# Patient Record
Sex: MALE | Race: WHITE | NOT HISPANIC OR LATINO | Employment: OTHER | URBAN - METROPOLITAN AREA
[De-identification: names, ages, dates, MRNs, and addresses within clinical notes are randomized per-mention and may not be internally consistent; named-entity substitution may affect disease eponyms.]

---

## 2017-01-04 ENCOUNTER — TELEPHONE (OUTPATIENT)
Dept: INTERNAL MEDICINE | Facility: CLINIC | Age: 62
End: 2017-01-04

## 2017-01-16 RX ORDER — MOMETASONE FUROATE 50 UG/1
SPRAY, METERED NASAL
Qty: 17 G | Refills: 1 | Status: SHIPPED | OUTPATIENT
Start: 2017-01-16 | End: 2017-06-30 | Stop reason: SDUPTHER

## 2017-03-23 RX ORDER — FINASTERIDE 5 MG/1
TABLET, FILM COATED ORAL
Qty: 90 TABLET | Refills: 1 | Status: SHIPPED | OUTPATIENT
Start: 2017-03-23 | End: 2017-09-07 | Stop reason: SDUPTHER

## 2017-04-07 RX ORDER — DOXAZOSIN 8 MG/1
TABLET ORAL
Qty: 90 TABLET | Refills: 1 | Status: SHIPPED | OUTPATIENT
Start: 2017-04-07 | End: 2017-04-28

## 2017-04-28 ENCOUNTER — APPOINTMENT (OUTPATIENT)
Dept: LAB | Facility: HOSPITAL | Age: 62
End: 2017-04-28

## 2017-04-28 ENCOUNTER — OFFICE VISIT (OUTPATIENT)
Dept: INTERNAL MEDICINE | Facility: CLINIC | Age: 62
End: 2017-04-28

## 2017-04-28 VITALS
OXYGEN SATURATION: 97 % | RESPIRATION RATE: 16 BRPM | HEIGHT: 69 IN | BODY MASS INDEX: 29.03 KG/M2 | HEART RATE: 64 BPM | WEIGHT: 196 LBS | TEMPERATURE: 97 F | SYSTOLIC BLOOD PRESSURE: 120 MMHG | DIASTOLIC BLOOD PRESSURE: 90 MMHG

## 2017-04-28 DIAGNOSIS — H61.22 IMPACTED CERUMEN OF LEFT EAR: ICD-10-CM

## 2017-04-28 DIAGNOSIS — Z11.59 NEED FOR HEPATITIS C SCREENING TEST: ICD-10-CM

## 2017-04-28 DIAGNOSIS — G89.29 CHRONIC PAIN OF BOTH SHOULDERS: ICD-10-CM

## 2017-04-28 DIAGNOSIS — M15.9 GENERALIZED OSTEOARTHRITIS: ICD-10-CM

## 2017-04-28 DIAGNOSIS — I44.4 LEFT ANTERIOR FASCICULAR BLOCK: Primary | ICD-10-CM

## 2017-04-28 DIAGNOSIS — Z12.5 SCREENING PSA (PROSTATE SPECIFIC ANTIGEN): ICD-10-CM

## 2017-04-28 DIAGNOSIS — K21.9 GASTROESOPHAGEAL REFLUX DISEASE WITHOUT ESOPHAGITIS: ICD-10-CM

## 2017-04-28 DIAGNOSIS — E78.00 PURE HYPERCHOLESTEROLEMIA: ICD-10-CM

## 2017-04-28 DIAGNOSIS — M25.511 CHRONIC PAIN OF BOTH SHOULDERS: ICD-10-CM

## 2017-04-28 DIAGNOSIS — N40.0 PROSTATISM: ICD-10-CM

## 2017-04-28 DIAGNOSIS — M25.512 CHRONIC PAIN OF BOTH SHOULDERS: ICD-10-CM

## 2017-04-28 DIAGNOSIS — R73.9 HYPERGLYCEMIA: ICD-10-CM

## 2017-04-28 DIAGNOSIS — E55.9 VITAMIN D DEFICIENCY: ICD-10-CM

## 2017-04-28 DIAGNOSIS — Z00.00 PREVENTATIVE HEALTH CARE: ICD-10-CM

## 2017-04-28 LAB
25(OH)D3 SERPL-MCNC: 39.2 NG/ML
ALBUMIN SERPL-MCNC: 4.5 G/DL (ref 3.2–4.8)
ALBUMIN/GLOB SERPL: 1.5 G/DL (ref 1.5–2.5)
ALP SERPL-CCNC: 61 U/L (ref 25–100)
ALT SERPL W P-5'-P-CCNC: 31 U/L (ref 7–40)
ANION GAP SERPL CALCULATED.3IONS-SCNC: 2 MMOL/L (ref 3–11)
ARTICHOKE IGE QN: 70 MG/DL (ref 0–130)
AST SERPL-CCNC: 33 U/L (ref 0–33)
BASOPHILS # BLD AUTO: 0.04 10*3/MM3 (ref 0–0.2)
BASOPHILS NFR BLD AUTO: 0.5 % (ref 0–1)
BILIRUB SERPL-MCNC: 0.8 MG/DL (ref 0.3–1.2)
BILIRUB UR QL STRIP: NEGATIVE
BUN BLD-MCNC: 16 MG/DL (ref 9–23)
BUN/CREAT SERPL: 20 (ref 7–25)
CALCIUM SPEC-SCNC: 10.1 MG/DL (ref 8.7–10.4)
CHLORIDE SERPL-SCNC: 105 MMOL/L (ref 99–109)
CHOLEST SERPL-MCNC: 149 MG/DL (ref 0–200)
CLARITY UR: CLEAR
CO2 SERPL-SCNC: 34 MMOL/L (ref 20–31)
COLOR UR: YELLOW
CREAT BLD-MCNC: 0.8 MG/DL (ref 0.6–1.3)
CRP SERPL-MCNC: 0.03 MG/DL (ref 0–1)
DEPRECATED RDW RBC AUTO: 48.3 FL (ref 37–54)
EOSINOPHIL # BLD AUTO: 0.12 10*3/MM3 (ref 0.1–0.3)
EOSINOPHIL NFR BLD AUTO: 1.6 % (ref 0–3)
ERYTHROCYTE [DISTWIDTH] IN BLOOD BY AUTOMATED COUNT: 13.6 % (ref 11.3–14.5)
GFR SERPL CREATININE-BSD FRML MDRD: 98 ML/MIN/1.73
GLOBULIN UR ELPH-MCNC: 3.1 GM/DL
GLUCOSE BLD-MCNC: 95 MG/DL (ref 70–100)
GLUCOSE UR STRIP-MCNC: NEGATIVE MG/DL
HBA1C MFR BLD: 6 % (ref 4.8–5.6)
HCT VFR BLD AUTO: 42.9 % (ref 38.9–50.9)
HCV AB SER DONR QL: NORMAL
HDLC SERPL-MCNC: 67 MG/DL (ref 40–60)
HGB BLD-MCNC: 14.1 G/DL (ref 13.1–17.5)
HGB UR QL STRIP.AUTO: NEGATIVE
IMM GRANULOCYTES # BLD: 0.01 10*3/MM3 (ref 0–0.03)
IMM GRANULOCYTES NFR BLD: 0.1 % (ref 0–0.6)
KETONES UR QL STRIP: NEGATIVE
LEUKOCYTE ESTERASE UR QL STRIP.AUTO: NEGATIVE
LYMPHOCYTES # BLD AUTO: 1.63 10*3/MM3 (ref 0.6–4.8)
LYMPHOCYTES NFR BLD AUTO: 22.4 % (ref 24–44)
MCH RBC QN AUTO: 31.5 PG (ref 27–31)
MCHC RBC AUTO-ENTMCNC: 32.9 G/DL (ref 32–36)
MCV RBC AUTO: 96 FL (ref 80–99)
MONOCYTES # BLD AUTO: 0.51 10*3/MM3 (ref 0–1)
MONOCYTES NFR BLD AUTO: 7 % (ref 0–12)
NEUTROPHILS # BLD AUTO: 4.97 10*3/MM3 (ref 1.5–8.3)
NEUTROPHILS NFR BLD AUTO: 68.4 % (ref 41–71)
NITRITE UR QL STRIP: NEGATIVE
PH UR STRIP.AUTO: 6 [PH] (ref 5–8)
PLATELET # BLD AUTO: 194 10*3/MM3 (ref 150–450)
PMV BLD AUTO: 9.8 FL (ref 6–12)
POTASSIUM BLD-SCNC: 4.7 MMOL/L (ref 3.5–5.5)
PROT SERPL-MCNC: 7.6 G/DL (ref 5.7–8.2)
PROT UR QL STRIP: NEGATIVE
PSA SERPL-MCNC: 0.97 NG/ML (ref 0–4)
RBC # BLD AUTO: 4.47 10*6/MM3 (ref 4.2–5.76)
SODIUM BLD-SCNC: 141 MMOL/L (ref 132–146)
SP GR UR STRIP: 1.01 (ref 1–1.03)
TRIGL SERPL-MCNC: 55 MG/DL (ref 0–150)
TSH SERPL DL<=0.05 MIU/L-ACNC: 2 MIU/ML (ref 0.35–5.35)
UROBILINOGEN UR QL STRIP: NORMAL
WBC NRBC COR # BLD: 7.28 10*3/MM3 (ref 3.5–10.8)

## 2017-04-28 PROCEDURE — 80061 LIPID PANEL: CPT | Performed by: INTERNAL MEDICINE

## 2017-04-28 PROCEDURE — 93000 ELECTROCARDIOGRAM COMPLETE: CPT | Performed by: INTERNAL MEDICINE

## 2017-04-28 PROCEDURE — 69210 REMOVE IMPACTED EAR WAX UNI: CPT | Performed by: INTERNAL MEDICINE

## 2017-04-28 PROCEDURE — 36415 COLL VENOUS BLD VENIPUNCTURE: CPT | Performed by: INTERNAL MEDICINE

## 2017-04-28 PROCEDURE — 84443 ASSAY THYROID STIM HORMONE: CPT | Performed by: INTERNAL MEDICINE

## 2017-04-28 PROCEDURE — 85025 COMPLETE CBC W/AUTO DIFF WBC: CPT | Performed by: INTERNAL MEDICINE

## 2017-04-28 PROCEDURE — 81003 URINALYSIS AUTO W/O SCOPE: CPT | Performed by: INTERNAL MEDICINE

## 2017-04-28 PROCEDURE — 83036 HEMOGLOBIN GLYCOSYLATED A1C: CPT | Performed by: INTERNAL MEDICINE

## 2017-04-28 PROCEDURE — 84153 ASSAY OF PSA TOTAL: CPT | Performed by: INTERNAL MEDICINE

## 2017-04-28 PROCEDURE — 99215 OFFICE O/P EST HI 40 MIN: CPT | Performed by: INTERNAL MEDICINE

## 2017-04-28 PROCEDURE — 80053 COMPREHEN METABOLIC PANEL: CPT | Performed by: INTERNAL MEDICINE

## 2017-04-28 PROCEDURE — 82306 VITAMIN D 25 HYDROXY: CPT | Performed by: INTERNAL MEDICINE

## 2017-04-28 PROCEDURE — 86140 C-REACTIVE PROTEIN: CPT | Performed by: INTERNAL MEDICINE

## 2017-04-28 PROCEDURE — 86803 HEPATITIS C AB TEST: CPT | Performed by: INTERNAL MEDICINE

## 2017-04-28 RX ORDER — TAMSULOSIN HYDROCHLORIDE 0.4 MG/1
1 CAPSULE ORAL NIGHTLY
Qty: 30 CAPSULE | Refills: 5 | Status: SHIPPED | OUTPATIENT
Start: 2017-04-28 | End: 2017-11-06 | Stop reason: ALTCHOICE

## 2017-04-28 RX ORDER — FAMOTIDINE 20 MG/1
20 TABLET, FILM COATED ORAL 2 TIMES DAILY PRN
COMMUNITY
End: 2019-01-23

## 2017-04-28 NOTE — PATIENT INSTRUCTIONS
1.  Stop doxazosin - start tamsulosin at bedtime - to improve prostate function.    2.  Continue other medications and supplements - as listed.    3.  Use Pepcid and antacids as needed - for stomach distress.    4.  Use 1 pound hand weights - for shoulder strengthening - 10 minutes - Monday Wednesday Friday morning.    5.  Follow well-balanced diet - low in salt and low in sugar.    6.  The nurse will call with test results.    7.  Return in August - fasting checkup.

## 2017-04-28 NOTE — PROGRESS NOTES
Subjective   Anderson Goode is a 61 y.o. male.     Chief Complaint   Patient presents with   • Hyperlipidemia       History of Present Illness     The patient has a long history of obesity with a total weight of 216 and a BMI of 31 - 4 years ago.  He has been referred to the dietitian and greatly tightened down on his calorie intake.  He has followed  a diabetic diet because of fasting hyperglycemia.  His mother is diabetic.  He has noted episodes of renal disease or neuropathy.    The following portions of the patient's history were reviewed and updated as appropriate: allergies, current medications, past family history, past medical history, past social history, past surgical history and problem list.    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/29/2016   • Prostatism 07/29/2016   • Chronic pain of both shoulders 07/29/2016   • Vitamin D deficiency 07/29/2016   • Preventative health care 08/19/2016   • Colon adenomas 08/19/2016   • RBBB 08/21/2016   • Diverticulosis 12/24/2016   • Cerumen impaction 04/30/2017     Resolved Ambulatory Problems     Diagnosis Date Noted   • Disorder of prostate 07/29/2016   • Shoulder injury 07/29/2016     Past Medical History:   Diagnosis Date   • Allergic rhinitis    • Chronic low back pain 1988   • Colon adenomas 2016   • Diverticulosis 2016   • Elbow fracture, right 1964   • Generalized osteoarthritis    • GERD (gastroesophageal reflux disease)    • Hyperlipidemia    • Obesity    • Prediabetes    • Prostatism    • RBBB    • UTI (urinary tract infection)    • Vitamin D deficiency      Past Surgical History:   Procedure Laterality Date   • APPENDECTOMY  1964   • CARDIOVASCULAR STRESS TEST  2014    Nuclear stress test normal   • COLONOSCOPY W/ POLYPECTOMY   2016    Benign adenomas   • HEMORRHOIDECTOMY  , 's x 3      with banding   • LIFT / REPAIR BROW PTOSIS FOREHEAD Bilateral    • LUMBAR EPIDURAL INJECTION  1988    Injections ×7 after back injury from fall   • TONSILLECTOMY  1961     Family History   Problem Relation Age of Onset   • Atrial fibrillation Mother    • Arthritis Mother    • Breast cancer Mother    • Diabetes Mother    • Heart attack Father       age 84   • COPD Father      tobacco user   • Other Father      MRSA   • Pneumonia Father    • Obesity Sister    • Esophagitis Sister    • Obesity Brother    • Drug abuse Other      Overdose   • Dementia Other    • Lumbar disc disease Other      Chronic pain syndrome     Social History     Social History   • Marital status:      Spouse name: N/A   • Number of children: N/A   • Years of education: N/A     Occupational History   • Not on file.     Social History Main Topics   • Smoking status: Never Smoker   • Smokeless tobacco: Never Used   • Alcohol use 4.2 oz/week     7 Cans of beer per week      Comment: wife smoked 2 PPD - 34 years   • Drug use: No   • Sexual activity: Not on file     Other Topics Concern   • Not on file     Social History Narrative    Domestic life   lives in private home with wife who is severely disabled - most the time in bed                             must check on her every 4 hours 7 days a week.                             must attend to all her hygiene.  no routine help from family or friends.        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   HENT: Positive for congestion and sneezing. Negative for ear pain and sore throat.         Increase congestion symptoms this spring   Eyes: Negative for itching and visual disturbance.   Respiratory: Negative for cough and shortness of breath.    Cardiovascular: Negative for chest pain and palpitations.   Gastrointestinal: Negative for abdominal pain and nausea.        Minimal indigestion.   Endocrine: Negative for cold intolerance and heat intolerance.   Genitourinary: Positive for difficulty urinating. Negative for dysuria and hematuria.        Weak urine stream and adequate control.  Continues on Flomax and Cardura.   Musculoskeletal: Positive for arthralgias and back pain. Negative for gait problem.        Moderate generalized joint aches respond to OTC med   Skin: Negative for rash and wound.   Allergic/Immunologic: Negative for environmental allergies and food allergies.   Neurological: Negative for dizziness and headaches.   Hematological: Negative for adenopathy. Does not bruise/bleed easily.   Psychiatric/Behavioral: Negative for sleep disturbance. The patient is not nervous/anxious.        Objective   Blood pressure 120/90, pulse 64, temperature 97 °F (36.1 °C), temperature source Oral, resp. rate 16, height 69\" (175.3 cm), weight 196 lb (88.9 kg), SpO2 97 %.    Physical Exam   Constitutional: He is oriented to person, place, and time. He appears well-developed and well-nourished. No distress.   HENT:   Right Ear: External ear normal.   Left Ear: External ear normal.   Nose: Nose normal.   Mouth/Throat: Oropharynx is clear and moist.   Left ear obstructed with wax.  Canal and TM normal after wax removal except for mild erythema of lower canal wall..  Markedly decreased hearing right ear.   Eyes: EOM are normal. Pupils are equal, round, and reactive to light.   Neck: Normal range of motion. Neck supple. No JVD present. No thyromegaly present.   Cardiovascular: " Normal rate, regular rhythm, normal heart sounds and intact distal pulses.    Pulmonary/Chest: Effort normal and breath sounds normal. He has no wheezes. He has no rales.   Abdominal: Soft. Bowel sounds are normal. He exhibits no distension and no mass. There is no tenderness. No hernia.   Genitourinary: Rectum normal and penis normal.   Genitourinary Comments: Prostate moderately enlarged  Testicles normal   Musculoskeletal: Normal range of motion. He exhibits no edema or tenderness.   Minimal low back tenderness.   Lymphadenopathy:     He has no cervical adenopathy.   Neurological: He is alert and oriented to person, place, and time. He displays normal reflexes. No cranial nerve deficit. He exhibits normal muscle tone. Coordination normal.   Vibratory normal  Romberg negative  Gait normal  Plantars downgoing     Skin: Skin is warm and dry. No rash noted.   Psychiatric: He has a normal mood and affect. His behavior is normal. Judgment and thought content normal.   Nursing note and vitals reviewed.      ECG 12 Lead  Date/Time: 4/28/2017 11:00 AM  Performed by: DINH RAINES  Authorized by: DINH RAINES   Interpreted by ED physician: Dinh Raines M.D.  Comparison: compared with previous ECG from 8/19/2016  Similar to previous ECG  Rhythm: sinus rhythm  Rate: normal  BPM: 60  Conduction: right bundle branch block and non-specific intraventricular conduction delay  ST Segments: ST segments normal  T Waves: T waves normal  QRS axis: left  Other findings: early repolarization  Clinical impression: abnormal ECG  Comments: Indication - abnormal EKG  Baseline EKG      Ear Cerumen Removal Instrumentation  Date/Time: 4/28/2017 11:13 AM  Performed by: DINH RAINES  Authorized by: DINH RAINES  Consent: Verbal consent obtained. Written consent obtained.  Risks and benefits: risks, benefits and alternatives were discussed  Consent given by: patient  Patient understanding: patient states understanding  "of the procedure being performed  Patient consent: the patient's understanding of the procedure matches consent given  Procedure consent: procedure consent matches procedure scheduled  Relevant documents: relevant documents present and verified  Site marked: the operative site was marked  Patient identity confirmed: verbally with patient  Time out: Immediately prior to procedure a \"time out\" was called to verify the correct patient, procedure, equipment, support staff and site/side marked as required.  Local anesthetic: none  Location details: left ear  Procedure type: curette  Patient sedated: no  Patient tolerance: Patient tolerated the procedure well with no immediate complications  Comments: Complete impaction of the left ear with scaly and firm wax.  Easily removed with sterile curet.  Patient had no discomfort.  TM is fairly normal.  There is mild erythema of the lower canal.  Aluminum acetate instilled in left ear canal.  Hearing is fully normal.        Assessment/Plan   Anderson was seen today for hyperlipidemia.    Diagnoses and all orders for this visit:    Left anterior fascicular block  -     ECG 12 Lead  -     TSH    Pure hypercholesterolemia  -     Comprehensive Metabolic Panel  -     Lipid Panel    Gastroesophageal reflux disease without esophagitis  -     CBC & Differential  -     Urinalysis With / Microscopic If Indicated  -     CBC Auto Differential    Vitamin D deficiency  -     Vitamin D 25 Hydroxy    Generalized osteoarthritis  -     C-reactive Protein    Prostatism    Hyperglycemia  -     Hemoglobin A1c    Preventative health care    Chronic pain of both shoulders    Screening PSA (prostate specific antigen)  -     PSA    Need for hepatitis C screening test  -     Hepatitis C Antibody    Impacted cerumen of left ear    Other orders  -     tamsulosin (FLOMAX) 0.4 MG capsule 24 hr capsule; Take 1 capsule by mouth Every Night.  -     Ear Cerumen Removal Instrumentation      The patient's prediabetes " has severely worsened now with a hemoglobin A1c of 6.0.  I've asked him to bring his weight down below 190 pounds this year.  He may be a candidate for metformin with any further deterioration in his metabolic status.    The patient has moderate hyperlipidemia and risk for stroke and heart attack.  His LDL cholesterol now at 70 is excellent and he should continue the same atorvastatin.  He is on daily aspirin for primary prevention.    The patient has moderate prostatism and for some reason has stayed on both Flomax and Cardura.  I've asked him to stop Cardura since his blood pressure is running low.  We will monitor this expectantly.    The patient has GERD with many year history of heartburn symptoms.  He has come off of omeprazole for long-term safety.  I've asked him to be regular with famotidine for the near future.    The patient has chronic history of shoulder tendinitis.  He shoulders are moving better today but he has intermittent achiness.  He does return to repetitive lifting all week.  A few minutes of light weight range of motion exercises may build his shoulder strength and endurance.    The patient has a long history of arthralgias in his knees.  He has done much better on glucosamine.  He should consider stationary cycling to build knee strength.    Patient Instructions   1.  Stop doxazosin - start tamsulosin at bedtime - to improve prostate function.    2.  Continue other medications and supplements - as listed.    3.  Use Pepcid and antacids as needed - for stomach distress.    4.  Use 1 pound hand weights - for shoulder strengthening - 10 minutes - Monday Wednesday Friday morning.    5.  Follow well-balanced diet - low in salt and low in sugar.    6.  The nurse will call with test results.    7.  Return in August - fasting checkup.    8.Hemoglobin A1c has elevated to 6.0.  Tighten diabetic diet.  Consider registered dietitian.    9.  Other laboratory tests are acceptable and require no change in  treatment.    Current Outpatient Prescriptions:   •  famotidine (PEPCID) 20 MG tablet, Take 20 mg by mouth 2 (Two) Times a Day As Needed., Disp: , Rfl:   •  Ascorbic Acid (VITAMIN C) 500 MG capsule, Take  by mouth daily., Disp: , Rfl:   •  Aspirin-Caffeine (CIRILO BACK & BODY PAIN EX ST) 500-32.5 MG tablet, Take  by mouth., Disp: , Rfl:   •  atorvastatin (LIPITOR) 80 MG tablet, TAKE ONE TABLET BY MOUTH EVERY NIGHT AT BEDTIME, Disp: 90 tablet, Rfl: 1  •  Cetirizine HCl (ZYRTEC ALLERGY) 10 MG capsule, Take  by mouth., Disp: , Rfl:   •  Cholecalciferol (VITAMIN D) 1000 UNITS tablet, Take 1 capsule by mouth., Disp: , Rfl:   •  finasteride (PROSCAR) 5 MG tablet, TAKE ONE TABLET BY MOUTH EVERY NIGHT AT BEDTIME, Disp: 90 tablet, Rfl: 1  •  Glucosamine HCl 1500 MG tablet, Take 1,500 mg by mouth daily., Disp: 30 each, Rfl:   •  hypromellose (ARTIFICIAL TEARS) 0.4 % solution, Apply  to eye., Disp: , Rfl:   •  mometasone (NASONEX) 50 MCG/ACT nasal spray, SPRAY ONE SPRAY IN EACH NOSTRIL EVERY MORNING, Disp: 17 g, Rfl: 1  •  Multiple Vitamins-Minerals (CENTRUM ADULTS PO), Take  by mouth daily., Disp: , Rfl:   •  tamsulosin (FLOMAX) 0.4 MG capsule 24 hr capsule, Take 1 capsule by mouth Every Night., Disp: 30 capsule, Rfl: 5    Allergies   Allergen Reactions   • Cephalexin Rash   • Omeprazole GI Intolerance       Immunization History   Administered Date(s) Administered   • Tdap 05/11/2010   • Zoster 07/01/2015     Electronically signed Dinh Raines M.D.4/30/2017 1:48 PM

## 2017-04-30 PROBLEM — H61.20 CERUMEN IMPACTION: Status: ACTIVE | Noted: 2017-04-30

## 2017-05-03 ENCOUNTER — TELEPHONE (OUTPATIENT)
Dept: INTERNAL MEDICINE | Facility: CLINIC | Age: 62
End: 2017-05-03

## 2017-06-30 RX ORDER — MOMETASONE FUROATE 50 UG/1
SPRAY, METERED NASAL
Qty: 17 G | Refills: 1 | Status: SHIPPED | OUTPATIENT
Start: 2017-06-30 | End: 2018-01-10 | Stop reason: SDUPTHER

## 2017-08-25 ENCOUNTER — APPOINTMENT (OUTPATIENT)
Dept: LAB | Facility: HOSPITAL | Age: 62
End: 2017-08-25

## 2017-08-25 ENCOUNTER — OFFICE VISIT (OUTPATIENT)
Dept: INTERNAL MEDICINE | Facility: CLINIC | Age: 62
End: 2017-08-25

## 2017-08-25 VITALS
WEIGHT: 183 LBS | OXYGEN SATURATION: 97 % | BODY MASS INDEX: 27.02 KG/M2 | TEMPERATURE: 97.8 F | RESPIRATION RATE: 16 BRPM | SYSTOLIC BLOOD PRESSURE: 110 MMHG | DIASTOLIC BLOOD PRESSURE: 80 MMHG | HEART RATE: 68 BPM

## 2017-08-25 DIAGNOSIS — E78.00 PURE HYPERCHOLESTEROLEMIA: ICD-10-CM

## 2017-08-25 DIAGNOSIS — R73.9 HYPERGLYCEMIA: ICD-10-CM

## 2017-08-25 DIAGNOSIS — M15.9 GENERALIZED OSTEOARTHRITIS: Primary | ICD-10-CM

## 2017-08-25 DIAGNOSIS — K21.9 GASTROESOPHAGEAL REFLUX DISEASE WITHOUT ESOPHAGITIS: ICD-10-CM

## 2017-08-25 DIAGNOSIS — R35.1 NOCTURIA: ICD-10-CM

## 2017-08-25 DIAGNOSIS — N40.0 PROSTATISM: ICD-10-CM

## 2017-08-25 LAB
ALBUMIN SERPL-MCNC: 4.3 G/DL (ref 3.2–4.8)
ALBUMIN/GLOB SERPL: 1.3 G/DL (ref 1.5–2.5)
ALP SERPL-CCNC: 77 U/L (ref 25–100)
ALT SERPL W P-5'-P-CCNC: 29 U/L (ref 7–40)
ANION GAP SERPL CALCULATED.3IONS-SCNC: 13 MMOL/L (ref 3–11)
ARTICHOKE IGE QN: 79 MG/DL (ref 0–130)
AST SERPL-CCNC: 24 U/L (ref 0–33)
BILIRUB SERPL-MCNC: 1.1 MG/DL (ref 0.3–1.2)
BILIRUB UR QL STRIP: NEGATIVE
BUN BLD-MCNC: 17 MG/DL (ref 9–23)
BUN/CREAT SERPL: 24.3 (ref 7–25)
CALCIUM SPEC-SCNC: 10.4 MG/DL (ref 8.7–10.4)
CHLORIDE SERPL-SCNC: 103 MMOL/L (ref 99–109)
CHOLEST SERPL-MCNC: 161 MG/DL (ref 0–200)
CLARITY UR: CLEAR
CO2 SERPL-SCNC: 23 MMOL/L (ref 20–31)
COLOR UR: YELLOW
CREAT BLD-MCNC: 0.7 MG/DL (ref 0.6–1.3)
GFR SERPL CREATININE-BSD FRML MDRD: 114 ML/MIN/1.73
GLOBULIN UR ELPH-MCNC: 3.4 GM/DL
GLUCOSE BLD-MCNC: 96 MG/DL (ref 70–100)
GLUCOSE UR STRIP-MCNC: NEGATIVE MG/DL
HBA1C MFR BLD: 5.6 % (ref 4.8–5.6)
HDLC SERPL-MCNC: 73 MG/DL (ref 40–60)
HGB UR QL STRIP.AUTO: NEGATIVE
KETONES UR QL STRIP: NEGATIVE
LEUKOCYTE ESTERASE UR QL STRIP.AUTO: NEGATIVE
NITRITE UR QL STRIP: NEGATIVE
PH UR STRIP.AUTO: 5.5 [PH] (ref 5–8)
POTASSIUM BLD-SCNC: 4.8 MMOL/L (ref 3.5–5.5)
PROT SERPL-MCNC: 7.7 G/DL (ref 5.7–8.2)
PROT UR QL STRIP: NEGATIVE
SODIUM BLD-SCNC: 139 MMOL/L (ref 132–146)
SP GR UR STRIP: 1.02 (ref 1–1.03)
TRIGL SERPL-MCNC: 58 MG/DL (ref 0–150)
UROBILINOGEN UR QL STRIP: NORMAL

## 2017-08-25 PROCEDURE — 80061 LIPID PANEL: CPT | Performed by: INTERNAL MEDICINE

## 2017-08-25 PROCEDURE — 99213 OFFICE O/P EST LOW 20 MIN: CPT | Performed by: INTERNAL MEDICINE

## 2017-08-25 PROCEDURE — 36415 COLL VENOUS BLD VENIPUNCTURE: CPT | Performed by: INTERNAL MEDICINE

## 2017-08-25 PROCEDURE — 80053 COMPREHEN METABOLIC PANEL: CPT | Performed by: INTERNAL MEDICINE

## 2017-08-25 PROCEDURE — 81003 URINALYSIS AUTO W/O SCOPE: CPT | Performed by: INTERNAL MEDICINE

## 2017-08-25 PROCEDURE — 83036 HEMOGLOBIN GLYCOSYLATED A1C: CPT | Performed by: INTERNAL MEDICINE

## 2017-08-25 NOTE — PATIENT INSTRUCTIONS
1.  Continue usual medicines and supplements - as listed.    2.  Take Pepcid - only as needed - for indigestion.    3.  Eat 3 meals a day - maintain your current weight.    4.  Get adequate rest every night - prevent fatigue.    5.  Return visit December  -  fasting checkup and preventive exam.

## 2017-08-25 NOTE — PROGRESS NOTES
Subjective   Anderson Goode is a 61 y.o. male.     History of Present Illness     The patient's had many years of prostatism treated with alpha blockers and finasteride.  This year he is having more urgency and frequency daytime and night.  He has had no burning on urination and no hematuria.  He is under great stress because her recent fracture of his wrist and the fact that his wife is hospitalized with likely a terminal illness.    The following portions of the patient's history were reviewed and updated as appropriate: allergies, current medications, past family history, past medical history, past social history, past surgical history and problem list.    Review of Systems   Constitutional: Negative for appetite change and fatigue.   Gastrointestinal: Negative for abdominal distention and nausea.   Genitourinary: Positive for frequency and urgency. Negative for dysuria.   Musculoskeletal: Positive for back pain and joint swelling.   Neurological: Negative for dizziness and light-headedness.   Psychiatric/Behavioral: Positive for sleep disturbance. The patient is nervous/anxious.         Physical wife at Hospital daily this month.  She is likely terminal.       Objective   Blood pressure 110/80, pulse 68, temperature 97.8 °F (36.6 °C), temperature source Oral, resp. rate 16, weight 183 lb (83 kg), SpO2 97 %.    Physical Exam   Constitutional: He is oriented to person, place, and time. He appears well-developed and well-nourished.   Cardiovascular: Normal rate, regular rhythm and normal heart sounds.    Pulmonary/Chest: Effort normal and breath sounds normal. He has no wheezes. He has no rales.   Musculoskeletal:   Right wrist and large rigid brace.  Right  strength mildly impaired.   Neurological: He is alert and oriented to person, place, and time. He displays normal reflexes. No cranial nerve deficit.   Psychiatric: He has a normal mood and affect. His behavior is normal. Thought content normal.   Nursing  note and vitals reviewed.    Procedures  Assessment/Plan   Anderson was seen today for benign prostatic hypertrophy.    Diagnoses and all orders for this visit:    Generalized osteoarthritis    Hyperglycemia  -     Hemoglobin A1c    Pure hypercholesterolemia  -     Comprehensive Metabolic Panel  -     Lipid Panel    Gastroesophageal reflux disease without esophagitis    Prostatism  -     Urinalysis With / Culture If Indicated  -     Ambulatory Referral to Urology    Nocturia  -     Urinalysis With / Culture If Indicated  -     Ambulatory Referral to Urology      The patient has progressive prostatism despite dual therapy now for several years.  I've asked him to visit the urologist.    The patient's hyperlipidemia is adequately controlled with an LDL of 79.  He should continue on aspirin for primary prevention.    The patient is prediabetic with a hemoglobin A!c of 6.0 this year.  He is made marked progress with hemoglobin A1c 5.6%.  This is likely related to his weight loss.    The patient's weight has dropped from 196 down to 183 in the last 4 months.  This is apparently stress related associated with his wife's illness and his own injury.  I've encouraged him to not skip meals and an at least maintain his current weight next visit.      Patient Instructions   1.  Continue usual medicines and supplements - as listed.    2.  Take Pepcid - only as needed - for indigestion.    3.  Eat 3 meals a day - maintain your current weight.    4.  Get adequate rest every night - prevent fatigue.    5.  Return visit December  -  fasting checkup and preventive exam.    6.  LDL cholesterol excellent at 79.    7.  Hemoglobin A1c acceptable at 5.6%.  Continue low-calorie diabetic diet.    8.  Chemistry panel and urinalysis are acceptable.    9.  Appointment with urologist for progressive prostatism.    Electronically signed Dinh Raines M.D.8/27/2017 2:45 PM

## 2017-08-30 ENCOUNTER — TELEPHONE (OUTPATIENT)
Dept: INTERNAL MEDICINE | Facility: CLINIC | Age: 62
End: 2017-08-30

## 2017-08-30 NOTE — TELEPHONE ENCOUNTER
Called labs to pt.  Per TGF:  LDL cholesterol excellent at 79.  Hemoglobin A1c acceptable at 5.6%.  Continue low-calorie diabetic diet.  Chemistry panel and urinalysis are acceptable.  Appointment with urologist for progressive prostatism.  Appt scheduled w Dr Arellano on 9/6 @ 10 am.    Pt verb understanding.

## 2017-09-07 RX ORDER — ATORVASTATIN CALCIUM 80 MG/1
TABLET, FILM COATED ORAL
Qty: 90 TABLET | Refills: 1 | Status: SHIPPED | OUTPATIENT
Start: 2017-09-07 | End: 2018-12-21 | Stop reason: SDUPTHER

## 2017-09-07 RX ORDER — FINASTERIDE 5 MG/1
TABLET, FILM COATED ORAL
Qty: 90 TABLET | Refills: 1 | Status: SHIPPED | OUTPATIENT
Start: 2017-09-07 | End: 2018-04-09 | Stop reason: SDUPTHER

## 2017-11-02 RX ORDER — DOXAZOSIN 8 MG/1
TABLET ORAL
Qty: 90 TABLET | Refills: 0 | OUTPATIENT
Start: 2017-11-02

## 2017-11-06 ENCOUNTER — TELEPHONE (OUTPATIENT)
Dept: INTERNAL MEDICINE | Facility: CLINIC | Age: 62
End: 2017-11-06

## 2017-11-06 RX ORDER — DOXAZOSIN MESYLATE 4 MG/1
4 TABLET ORAL NIGHTLY
Qty: 90 TABLET | Refills: 1 | Status: SHIPPED | OUTPATIENT
Start: 2017-11-06 | End: 2018-05-17 | Stop reason: SDUPTHER

## 2017-11-06 NOTE — TELEPHONE ENCOUNTER
----- Message from Shankar Franco sent at 11/3/2017 12:30 PM EDT -----  Contact: [facility]  [facility]:  [facility] switched him to Tamsulosin in April, but the Doxazosin works better.  He wants to go back on that.  Brooke Miller.  [facility] [phone]

## 2017-12-29 ENCOUNTER — APPOINTMENT (OUTPATIENT)
Dept: LAB | Facility: HOSPITAL | Age: 62
End: 2017-12-29

## 2017-12-29 ENCOUNTER — OFFICE VISIT (OUTPATIENT)
Dept: INTERNAL MEDICINE | Facility: CLINIC | Age: 62
End: 2017-12-29

## 2017-12-29 VITALS
WEIGHT: 194 LBS | SYSTOLIC BLOOD PRESSURE: 120 MMHG | TEMPERATURE: 97.7 F | RESPIRATION RATE: 16 BRPM | DIASTOLIC BLOOD PRESSURE: 70 MMHG | HEART RATE: 72 BPM | OXYGEN SATURATION: 96 % | BODY MASS INDEX: 28.65 KG/M2

## 2017-12-29 DIAGNOSIS — K21.9 GASTROESOPHAGEAL REFLUX DISEASE WITHOUT ESOPHAGITIS: ICD-10-CM

## 2017-12-29 DIAGNOSIS — E78.00 PURE HYPERCHOLESTEROLEMIA: Primary | ICD-10-CM

## 2017-12-29 DIAGNOSIS — N40.0 PROSTATISM: ICD-10-CM

## 2017-12-29 DIAGNOSIS — R73.9 HYPERGLYCEMIA: ICD-10-CM

## 2017-12-29 LAB
ALBUMIN SERPL-MCNC: 4.2 G/DL (ref 3.2–4.8)
ALBUMIN/GLOB SERPL: 1.3 G/DL (ref 1.5–2.5)
ALP SERPL-CCNC: 70 U/L (ref 25–100)
ALT SERPL W P-5'-P-CCNC: 27 U/L (ref 7–40)
ANION GAP SERPL CALCULATED.3IONS-SCNC: 7 MMOL/L (ref 3–11)
ARTICHOKE IGE QN: 70 MG/DL (ref 0–130)
AST SERPL-CCNC: 25 U/L (ref 0–33)
BILIRUB SERPL-MCNC: 0.6 MG/DL (ref 0.3–1.2)
BUN BLD-MCNC: 15 MG/DL (ref 9–23)
BUN/CREAT SERPL: 18.8 (ref 7–25)
CALCIUM SPEC-SCNC: 9.5 MG/DL (ref 8.7–10.4)
CHLORIDE SERPL-SCNC: 102 MMOL/L (ref 99–109)
CHOLEST SERPL-MCNC: 141 MG/DL (ref 0–200)
CO2 SERPL-SCNC: 28 MMOL/L (ref 20–31)
CREAT BLD-MCNC: 0.8 MG/DL (ref 0.6–1.3)
GFR SERPL CREATININE-BSD FRML MDRD: 98 ML/MIN/1.73
GLOBULIN UR ELPH-MCNC: 3.2 GM/DL
GLUCOSE BLD-MCNC: 97 MG/DL (ref 70–100)
HBA1C MFR BLD: 5.8 % (ref 4.8–5.6)
HDLC SERPL-MCNC: 68 MG/DL (ref 40–60)
POTASSIUM BLD-SCNC: 4.7 MMOL/L (ref 3.5–5.5)
PROT SERPL-MCNC: 7.4 G/DL (ref 5.7–8.2)
SODIUM BLD-SCNC: 137 MMOL/L (ref 132–146)
TRIGL SERPL-MCNC: 40 MG/DL (ref 0–150)

## 2017-12-29 PROCEDURE — 80061 LIPID PANEL: CPT | Performed by: INTERNAL MEDICINE

## 2017-12-29 PROCEDURE — 99213 OFFICE O/P EST LOW 20 MIN: CPT | Performed by: INTERNAL MEDICINE

## 2017-12-29 PROCEDURE — 80053 COMPREHEN METABOLIC PANEL: CPT | Performed by: INTERNAL MEDICINE

## 2017-12-29 PROCEDURE — 99396 PREV VISIT EST AGE 40-64: CPT | Performed by: INTERNAL MEDICINE

## 2017-12-29 PROCEDURE — 36415 COLL VENOUS BLD VENIPUNCTURE: CPT | Performed by: INTERNAL MEDICINE

## 2017-12-29 PROCEDURE — 83036 HEMOGLOBIN GLYCOSYLATED A1C: CPT | Performed by: INTERNAL MEDICINE

## 2017-12-29 NOTE — PATIENT INSTRUCTIONS
1.  Continue usual medicines and supplements - as listed.    2.  Follow a low-calorie diabetic diet - low in salt low in sugar.    3.  Walk daily - maintain physical fitness.    4.  Squeeze soft rubber balls every morning - build  strength.    5.  Return visit April - fasting checkup.

## 2017-12-29 NOTE — PROGRESS NOTES
Subjective   Anderson Goode is a 62 y.o. male.     History of Present Illness     The patient's had moderate increased cardiovascular risk in recent years.  His father  of a heart attack in his mother has a heart arrhythmia.  The patient has moderate hyperlipidemia and is been on statin therapy for many years.  In recent years she has become prediabetic and is followed a diabetic diet.  He had brought his weight down to 185 pounds earlier this year.  He has had an extremely stressful life or her last year taking care of his chronically disabled wife who eventually  in recent months.    The following portions of the patient's history were reviewed and updated as appropriate: allergies, current medications, past family history, past medical history, past social history, past surgical history and problem list.    Review of Systems   Constitutional: Negative for appetite change and fatigue.   HENT: Positive for congestion and sneezing. Negative for ear pain and sore throat.    Respiratory: Negative for cough and shortness of breath.    Cardiovascular: Negative for chest pain and palpitations.   Gastrointestinal: Negative for abdominal pain and nausea.   Musculoskeletal: Negative for arthralgias and back pain.        Arthralgia is generally responded to bear back and body.   Neurological: Negative for dizziness and headaches.       Objective   Blood pressure 120/70, pulse 72, temperature 97.7 °F (36.5 °C), temperature source Oral, resp. rate 16, weight 88 kg (194 lb), SpO2 96 %.    Physical Exam   Constitutional: He is oriented to person, place, and time. He appears well-developed and well-nourished. No distress.   Neck: Normal range of motion. Neck supple. No JVD present.   Cardiovascular: Normal rate, regular rhythm and normal heart sounds.    Pulmonary/Chest: Effort normal and breath sounds normal. He has no wheezes. He has no rales.   Abdominal: Soft. Bowel sounds are normal. He exhibits no distension and no  mass. There is no tenderness.   Musculoskeletal: Normal range of motion. He exhibits no edema.   Generalized stiffness of knees hips and shoulders.   Lymphadenopathy:     He has no cervical adenopathy.   Neurological: He is alert and oriented to person, place, and time. He exhibits normal muscle tone. Coordination normal.   Psychiatric: He has a normal mood and affect. His behavior is normal. Judgment and thought content normal.   Nursing note and vitals reviewed.    Procedures  Assessment/Plan   Anderson was seen today for hyperlipidemia.    Diagnoses and all orders for this visit:    Pure hypercholesterolemia  -     Comprehensive Metabolic Panel  -     Lipid Panel    Hyperglycemia  -     Hemoglobin A1c    Prostatism    Gastroesophageal reflux disease without esophagitis      The patient has prediabetes with an elevating hemoglobin A1c at 5.8%.  He has gained 9 pounds of weight this fall.  He apparently has much less stress since the death of his wife and is socializing more with his local family.  I've asked him to keep his weight down to 180 or 185 for long-term health.    Evaluation is cholesterol control is adequate with an LDL of 70.  He should continue atorvastatin 80 mg.  I've asked her to continue daily aspirin intake.    The patient has moderate chronic GERD which also is other first-degree relatives.  He should continue on regular Pepcid.  I've asked him to avoid nonsteroidals and PPIs for long-term safety.    The preventive exam has been reviewed in detail.  The patient has been fully counseled on preventative guidelines for vaccines, cancer screenings, and other health maintenance needs.   The patient has been counseled on guidelines for maintaining a lifestyle to promote good health and to minimize chronic diseases.  The patient has been assisted with scheduling these healthcare procedures for the coming year and given a written document outlining these recommendations.    Patient Instructions   1.   Continue usual medicines and supplements - as listed.    2.  Follow a low-calorie diabetic diet - low in salt low in sugar.    3.  Walk daily - maintain physical fitness.    4.  Squeeze soft rubber balls every morning - build  strength.    5.  Return visit April - fasting checkup.    6.  LDL cholesterol excellent at 70.  Continue atorvastatin daily.    7.  Hemoglobin A1c mildly elevated 5.8%.  This indicates prediabetes requiring a diabetic diet and daily walking.    8.  Chemistry panel is acceptable requires no change in treatment.    Electronically signed Dinh Raines M.D.12/31/2017 10:00 AM

## 2018-01-03 ENCOUNTER — TELEPHONE (OUTPATIENT)
Dept: INTERNAL MEDICINE | Facility: CLINIC | Age: 63
End: 2018-01-03

## 2018-01-03 NOTE — TELEPHONE ENCOUNTER
Called labs.  Per TGF - LDL cholesterol excellent at 70.  Continue atorvastatin daily. Hemoglobin A1c mildly elevated 5.8%.  This indicates prediabetes requiring a diabetic diet and daily walking.  Chemistry panel is acceptable requires no change in treatment.  Pt verb understanding.

## 2018-01-10 RX ORDER — MOMETASONE FUROATE 50 UG/1
SPRAY, METERED NASAL
Qty: 17 G | Refills: 1 | Status: SHIPPED | OUTPATIENT
Start: 2018-01-10 | End: 2018-07-05 | Stop reason: SDUPTHER

## 2018-04-10 RX ORDER — FINASTERIDE 5 MG/1
TABLET, FILM COATED ORAL
Qty: 90 TABLET | Refills: 1 | Status: SHIPPED | OUTPATIENT
Start: 2018-04-10 | End: 2018-10-26 | Stop reason: SDUPTHER

## 2018-05-17 RX ORDER — DOXAZOSIN MESYLATE 4 MG/1
4 TABLET ORAL NIGHTLY
Qty: 90 TABLET | Refills: 1 | Status: SHIPPED | OUTPATIENT
Start: 2018-05-17 | End: 2018-12-06 | Stop reason: SDUPTHER

## 2018-05-23 ENCOUNTER — OFFICE VISIT (OUTPATIENT)
Dept: INTERNAL MEDICINE | Facility: CLINIC | Age: 63
End: 2018-05-23

## 2018-05-23 ENCOUNTER — APPOINTMENT (OUTPATIENT)
Dept: LAB | Facility: HOSPITAL | Age: 63
End: 2018-05-23

## 2018-05-23 VITALS
WEIGHT: 190 LBS | TEMPERATURE: 98 F | OXYGEN SATURATION: 97 % | SYSTOLIC BLOOD PRESSURE: 110 MMHG | HEART RATE: 64 BPM | RESPIRATION RATE: 16 BRPM | DIASTOLIC BLOOD PRESSURE: 76 MMHG | BODY MASS INDEX: 28.06 KG/M2

## 2018-05-23 DIAGNOSIS — K21.9 GASTROESOPHAGEAL REFLUX DISEASE WITHOUT ESOPHAGITIS: ICD-10-CM

## 2018-05-23 DIAGNOSIS — M15.9 GENERALIZED OSTEOARTHRITIS: ICD-10-CM

## 2018-05-23 DIAGNOSIS — R73.9 HYPERGLYCEMIA: Primary | ICD-10-CM

## 2018-05-23 DIAGNOSIS — E78.00 PURE HYPERCHOLESTEROLEMIA: ICD-10-CM

## 2018-05-23 DIAGNOSIS — J30.2 CHRONIC SEASONAL ALLERGIC RHINITIS, UNSPECIFIED TRIGGER: ICD-10-CM

## 2018-05-23 PROBLEM — H61.20 CERUMEN IMPACTION: Status: RESOLVED | Noted: 2017-04-30 | Resolved: 2018-05-23

## 2018-05-23 LAB
ALBUMIN SERPL-MCNC: 4.1 G/DL (ref 3.2–4.8)
ALBUMIN/GLOB SERPL: 1.3 G/DL (ref 1.5–2.5)
ALP SERPL-CCNC: 63 U/L (ref 25–100)
ALT SERPL W P-5'-P-CCNC: 28 U/L (ref 7–40)
ANION GAP SERPL CALCULATED.3IONS-SCNC: 7 MMOL/L (ref 3–11)
ARTICHOKE IGE QN: 70 MG/DL (ref 0–130)
AST SERPL-CCNC: 30 U/L (ref 0–33)
BILIRUB SERPL-MCNC: 0.9 MG/DL (ref 0.3–1.2)
BUN BLD-MCNC: 15 MG/DL (ref 9–23)
BUN/CREAT SERPL: 21.4 (ref 7–25)
CALCIUM SPEC-SCNC: 9.3 MG/DL (ref 8.7–10.4)
CHLORIDE SERPL-SCNC: 106 MMOL/L (ref 99–109)
CHOLEST SERPL-MCNC: 135 MG/DL (ref 0–200)
CO2 SERPL-SCNC: 27 MMOL/L (ref 20–31)
CREAT BLD-MCNC: 0.7 MG/DL (ref 0.6–1.3)
GFR SERPL CREATININE-BSD FRML MDRD: 114 ML/MIN/1.73
GLOBULIN UR ELPH-MCNC: 3.2 GM/DL
GLUCOSE BLD-MCNC: 86 MG/DL (ref 70–100)
HBA1C MFR BLD: 5.9 % (ref 4.8–5.6)
HDLC SERPL-MCNC: 59 MG/DL (ref 40–60)
POTASSIUM BLD-SCNC: 3.9 MMOL/L (ref 3.5–5.5)
PROT SERPL-MCNC: 7.3 G/DL (ref 5.7–8.2)
SODIUM BLD-SCNC: 140 MMOL/L (ref 132–146)
TRIGL SERPL-MCNC: 42 MG/DL (ref 0–150)

## 2018-05-23 PROCEDURE — 83036 HEMOGLOBIN GLYCOSYLATED A1C: CPT | Performed by: INTERNAL MEDICINE

## 2018-05-23 PROCEDURE — 36415 COLL VENOUS BLD VENIPUNCTURE: CPT | Performed by: INTERNAL MEDICINE

## 2018-05-23 PROCEDURE — 80053 COMPREHEN METABOLIC PANEL: CPT | Performed by: INTERNAL MEDICINE

## 2018-05-23 PROCEDURE — 80061 LIPID PANEL: CPT | Performed by: INTERNAL MEDICINE

## 2018-05-23 PROCEDURE — 99214 OFFICE O/P EST MOD 30 MIN: CPT | Performed by: INTERNAL MEDICINE

## 2018-05-23 NOTE — PROGRESS NOTES
Subjective   Anderson Goode is a 62 y.o. male.     History of Present Illness     The patient's had many years of progressive osteoarthritis with increased stiffness and achiness.  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  He does moderate bending and lifting.  He has had no knee swelling.  He has had to avoid nonsteroidals due to GI distress.    The following portions of the patient's history were reviewed and updated as appropriate: allergies, current medications, past family history, past medical history, past social history, past surgical history and problem list.    Review of Systems   Constitutional: Negative for appetite change and fatigue.   HENT: Positive for congestion and sinus pressure. Negative for ear pain and sore throat.    Respiratory: Negative for cough and shortness of breath.    Cardiovascular: Negative for chest pain and palpitations.   Gastrointestinal: Negative for abdominal pain and nausea.   Musculoskeletal: Positive for arthralgias, back pain and gait problem.   Neurological: Negative for dizziness and headaches.       Objective   Blood pressure 110/76, pulse 64, temperature 98 °F (36.7 °C), temperature source Oral, resp. rate 16, weight 86.2 kg (190 lb), SpO2 97 %.    Physical Exam   Constitutional: He is oriented to person, place, and time. He appears well-developed and well-nourished. No distress.   Neck: Normal range of motion. Neck supple. No JVD present.   Cardiovascular: Normal rate, regular rhythm and normal heart sounds.    Pulmonary/Chest: Effort normal and breath sounds normal. He has no wheezes. He has no rales.   Lymphadenopathy:     He has no cervical adenopathy.   Neurological: He is alert and oriented to person, place, and time. He exhibits normal muscle tone. Coordination normal.   Psychiatric: He has a normal mood and affect. His behavior is normal. Judgment and  thought content normal.   Nursing note and vitals reviewed.    Procedures  Assessment/Plan   Anderson was seen today for knee pain.    Diagnoses and all orders for this visit:    Hyperglycemia  -     Hemoglobin A1c    Gastroesophageal reflux disease without esophagitis    Generalized osteoarthritis    Pure hypercholesterolemia  -     Comprehensive Metabolic Panel  -     Lipid Panel    Chronic seasonal allergic rhinitis, unspecified trigger      The patient has progressive knee pain and achiness.  He is not interested in pursuingorthopedic consultation at this time.  I've encouraged him to work with stationary cycling to build knee strength and support.  He continues his aspirin back and body as needed.    The patient has prediabetes and remains on a diabetic diet.  He still has an elevated BMI and should bring his body weight down to 190.    The patient has moderate chronic GERD.  He is doing well on famotidine on an as-needed basis.  He should minimize alcohol as potential cause for chronic gastritis.    Patient Instructions   1.  Continue usual medicines and supplements - as listed.    2.  Follow a low-calorie diabetic diet - low in salt low in sugar.    3.  Use  stationary cycle - 15 minutes 3 days weekly - build knee strength.    4.  The nurse will call - with test results.    5.  Next checkup 4 months - fasting.    6.  Hemoglobin A1c at 5.9% indicates prediabetes with good control of blood sugars.    7.  Chemistry panel and lipid panel are acceptable.    Electronically signed Dinh Raines M.D.5/26/2018 11:19 AM

## 2018-05-23 NOTE — PATIENT INSTRUCTIONS
1.  Continue usual medicines and supplements - as listed.    2.  Follow a low-calorie diabetic diet - low in salt low in sugar.    3.  Use  stationary cycle - 15 minutes 3 days weekly - build knee strength.    4.  The nurse will call - with test results.    5.  Next checkup 4 months - fasting.

## 2018-05-29 ENCOUNTER — TELEPHONE (OUTPATIENT)
Dept: INTERNAL MEDICINE | Facility: CLINIC | Age: 63
End: 2018-05-29

## 2018-05-29 NOTE — TELEPHONE ENCOUNTER
Called labs to pt. Left msg for pt to call for lab results per  THERESE (nodetailed VM on H phone, no cell # available)

## 2018-06-04 ENCOUNTER — TELEPHONE (OUTPATIENT)
Dept: INTERNAL MEDICINE | Facility: CLINIC | Age: 63
End: 2018-06-04

## 2018-06-04 NOTE — TELEPHONE ENCOUNTER
Pt returned call re labs.  Per TGF  Hemoglobin A1c at 5.9% indicates prediabetes with good control of blood sugars.   Chemistry panel and lipid panel are acceptable.  Pt verb understanding.l

## 2018-06-29 ENCOUNTER — APPOINTMENT (OUTPATIENT)
Dept: CT IMAGING | Facility: HOSPITAL | Age: 63
End: 2018-06-29

## 2018-06-29 ENCOUNTER — HOSPITAL ENCOUNTER (EMERGENCY)
Facility: HOSPITAL | Age: 63
Discharge: HOME | End: 2018-06-29
Attending: EMERGENCY MEDICINE | Admitting: EMERGENCY MEDICINE

## 2018-06-29 VITALS
OXYGEN SATURATION: 93 % | WEIGHT: 190 LBS | DIASTOLIC BLOOD PRESSURE: 88 MMHG | HEART RATE: 84 BPM | SYSTOLIC BLOOD PRESSURE: 112 MMHG | RESPIRATION RATE: 16 BRPM | BODY MASS INDEX: 25.73 KG/M2 | TEMPERATURE: 98.1 F | HEIGHT: 72 IN

## 2018-06-29 DIAGNOSIS — R10.30 LOWER ABDOMINAL PAIN: Primary | ICD-10-CM

## 2018-06-29 LAB
ALBUMIN SERPL-MCNC: 3.87 G/DL (ref 3.2–4.8)
ALBUMIN/GLOB SERPL: 1.4 G/DL (ref 1.5–2.5)
ALP SERPL-CCNC: 61 U/L (ref 25–100)
ALT SERPL W P-5'-P-CCNC: 28 U/L (ref 7–40)
ANION GAP SERPL CALCULATED.3IONS-SCNC: 7 MMOL/L (ref 3–11)
AST SERPL-CCNC: 25 U/L (ref 0–33)
BASOPHILS # BLD AUTO: 0.04 10*3/MM3 (ref 0–0.2)
BASOPHILS NFR BLD AUTO: 0.5 % (ref 0–1)
BILIRUB SERPL-MCNC: 1.2 MG/DL (ref 0.3–1.2)
BILIRUB UR QL STRIP: NEGATIVE
BUN BLD-MCNC: 17 MG/DL (ref 9–23)
BUN/CREAT SERPL: 26.6 (ref 7–25)
CALCIUM SPEC-SCNC: 9 MG/DL (ref 8.7–10.4)
CHLORIDE SERPL-SCNC: 106 MMOL/L (ref 99–109)
CLARITY UR: CLEAR
CO2 SERPL-SCNC: 29 MMOL/L (ref 20–31)
COLOR UR: YELLOW
CREAT BLD-MCNC: 0.64 MG/DL (ref 0.6–1.3)
DEPRECATED RDW RBC AUTO: 48.1 FL (ref 37–54)
EOSINOPHIL # BLD AUTO: 0.08 10*3/MM3 (ref 0–0.3)
EOSINOPHIL NFR BLD AUTO: 0.9 % (ref 0–3)
ERYTHROCYTE [DISTWIDTH] IN BLOOD BY AUTOMATED COUNT: 13.9 % (ref 11.3–14.5)
GFR SERPL CREATININE-BSD FRML MDRD: 126 ML/MIN/1.73
GLOBULIN UR ELPH-MCNC: 2.7 GM/DL
GLUCOSE BLD-MCNC: 118 MG/DL (ref 70–100)
GLUCOSE UR STRIP-MCNC: NEGATIVE MG/DL
HCT VFR BLD AUTO: 38.9 % (ref 38.9–50.9)
HGB BLD-MCNC: 12.8 G/DL (ref 13.1–17.5)
HGB UR QL STRIP.AUTO: NEGATIVE
IMM GRANULOCYTES # BLD: 0.02 10*3/MM3 (ref 0–0.03)
IMM GRANULOCYTES NFR BLD: 0.2 % (ref 0–0.6)
KETONES UR QL STRIP: NEGATIVE
LEUKOCYTE ESTERASE UR QL STRIP.AUTO: NEGATIVE
LIPASE SERPL-CCNC: 24 U/L (ref 6–51)
LYMPHOCYTES # BLD AUTO: 1.72 10*3/MM3 (ref 0.6–4.8)
LYMPHOCYTES NFR BLD AUTO: 19.5 % (ref 24–44)
MCH RBC QN AUTO: 31.2 PG (ref 27–31)
MCHC RBC AUTO-ENTMCNC: 32.9 G/DL (ref 32–36)
MCV RBC AUTO: 94.9 FL (ref 80–99)
MONOCYTES # BLD AUTO: 0.78 10*3/MM3 (ref 0–1)
MONOCYTES NFR BLD AUTO: 8.8 % (ref 0–12)
NEUTROPHILS # BLD AUTO: 6.22 10*3/MM3 (ref 1.5–8.3)
NEUTROPHILS NFR BLD AUTO: 70.3 % (ref 41–71)
NITRITE UR QL STRIP: NEGATIVE
PH UR STRIP.AUTO: 5.5 [PH] (ref 5–8)
PLATELET # BLD AUTO: 181 10*3/MM3 (ref 150–450)
PMV BLD AUTO: 9.9 FL (ref 6–12)
POTASSIUM BLD-SCNC: 4.2 MMOL/L (ref 3.5–5.5)
PROT SERPL-MCNC: 6.6 G/DL (ref 5.7–8.2)
PROT UR QL STRIP: NEGATIVE
RBC # BLD AUTO: 4.1 10*6/MM3 (ref 4.2–5.76)
SODIUM BLD-SCNC: 142 MMOL/L (ref 132–146)
SP GR UR STRIP: 1.05 (ref 1–1.03)
UROBILINOGEN UR QL STRIP: ABNORMAL
WBC NRBC COR # BLD: 8.84 10*3/MM3 (ref 3.5–10.8)

## 2018-06-29 PROCEDURE — 25010000002 ONDANSETRON PER 1 MG: Performed by: EMERGENCY MEDICINE

## 2018-06-29 PROCEDURE — 99283 EMERGENCY DEPT VISIT LOW MDM: CPT

## 2018-06-29 PROCEDURE — 25010000002 IOPAMIDOL 61 % SOLUTION: Performed by: EMERGENCY MEDICINE

## 2018-06-29 PROCEDURE — 85025 COMPLETE CBC W/AUTO DIFF WBC: CPT | Performed by: EMERGENCY MEDICINE

## 2018-06-29 PROCEDURE — 80053 COMPREHEN METABOLIC PANEL: CPT | Performed by: EMERGENCY MEDICINE

## 2018-06-29 PROCEDURE — 83690 ASSAY OF LIPASE: CPT | Performed by: EMERGENCY MEDICINE

## 2018-06-29 PROCEDURE — 74177 CT ABD & PELVIS W/CONTRAST: CPT

## 2018-06-29 PROCEDURE — 25010000002 HYDROMORPHONE PER 4 MG: Performed by: EMERGENCY MEDICINE

## 2018-06-29 PROCEDURE — 96374 THER/PROPH/DIAG INJ IV PUSH: CPT

## 2018-06-29 PROCEDURE — 81003 URINALYSIS AUTO W/O SCOPE: CPT | Performed by: EMERGENCY MEDICINE

## 2018-06-29 PROCEDURE — 96375 TX/PRO/DX INJ NEW DRUG ADDON: CPT

## 2018-06-29 RX ORDER — SODIUM CHLORIDE 0.9 % (FLUSH) 0.9 %
10 SYRINGE (ML) INJECTION AS NEEDED
Status: DISCONTINUED | OUTPATIENT
Start: 2018-06-29 | End: 2018-06-29 | Stop reason: HOSPADM

## 2018-06-29 RX ORDER — ONDANSETRON 2 MG/ML
4 INJECTION INTRAMUSCULAR; INTRAVENOUS ONCE
Status: COMPLETED | OUTPATIENT
Start: 2018-06-29 | End: 2018-06-29

## 2018-06-29 RX ADMIN — HYDROMORPHONE HYDROCHLORIDE 0.5 MG: 1 INJECTION, SOLUTION INTRAMUSCULAR; INTRAVENOUS; SUBCUTANEOUS at 13:49

## 2018-06-29 RX ADMIN — IOPAMIDOL 95 ML: 612 INJECTION, SOLUTION INTRAVENOUS at 15:51

## 2018-06-29 RX ADMIN — SODIUM CHLORIDE 500 ML: 9 INJECTION, SOLUTION INTRAVENOUS at 16:27

## 2018-06-29 RX ADMIN — SODIUM CHLORIDE 500 ML: 9 INJECTION, SOLUTION INTRAVENOUS at 13:47

## 2018-06-29 RX ADMIN — ONDANSETRON 4 MG: 2 INJECTION, SOLUTION INTRAMUSCULAR; INTRAVENOUS at 13:48

## 2018-07-05 RX ORDER — MOMETASONE FUROATE 50 UG/1
SPRAY, METERED NASAL
Qty: 17 G | Refills: 2 | Status: SHIPPED | OUTPATIENT
Start: 2018-07-05 | End: 2019-05-02 | Stop reason: SDUPTHER

## 2018-09-26 ENCOUNTER — OFFICE VISIT (OUTPATIENT)
Dept: FAMILY MEDICINE CLINIC | Facility: CLINIC | Age: 63
End: 2018-09-26

## 2018-09-26 VITALS
DIASTOLIC BLOOD PRESSURE: 68 MMHG | WEIGHT: 194.8 LBS | HEART RATE: 78 BPM | BODY MASS INDEX: 26.38 KG/M2 | OXYGEN SATURATION: 97 % | SYSTOLIC BLOOD PRESSURE: 110 MMHG | HEIGHT: 72 IN

## 2018-09-26 DIAGNOSIS — E55.9 VITAMIN D DEFICIENCY: ICD-10-CM

## 2018-09-26 DIAGNOSIS — E78.00 PURE HYPERCHOLESTEROLEMIA: Primary | ICD-10-CM

## 2018-09-26 DIAGNOSIS — R73.9 HYPERGLYCEMIA: ICD-10-CM

## 2018-09-26 DIAGNOSIS — K21.9 GASTROESOPHAGEAL REFLUX DISEASE WITHOUT ESOPHAGITIS: ICD-10-CM

## 2018-09-26 PROCEDURE — 99213 OFFICE O/P EST LOW 20 MIN: CPT | Performed by: FAMILY MEDICINE

## 2018-10-10 ENCOUNTER — TELEPHONE (OUTPATIENT)
Dept: FAMILY MEDICINE CLINIC | Facility: CLINIC | Age: 63
End: 2018-10-10

## 2018-10-10 LAB
25(OH)D3 SERPL-MCNC: 33.9 NG/ML
ALBUMIN SERPL-MCNC: 4.31 G/DL (ref 3.2–4.8)
ALBUMIN/GLOB SERPL: 1.7 G/DL (ref 1.5–2.5)
ALP SERPL-CCNC: 62 U/L (ref 25–100)
ALT SERPL W P-5'-P-CCNC: 36 U/L (ref 7–40)
ANION GAP SERPL CALCULATED.3IONS-SCNC: 1 MMOL/L (ref 3–11)
ARTICHOKE IGE QN: 59 MG/DL (ref 0–130)
AST SERPL-CCNC: 31 U/L (ref 0–33)
BILIRUB SERPL-MCNC: 1 MG/DL (ref 0.3–1.2)
BUN BLD-MCNC: 17 MG/DL (ref 9–23)
BUN/CREAT SERPL: 21.3 (ref 7–25)
CALCIUM SPEC-SCNC: 9.1 MG/DL (ref 8.7–10.4)
CHLORIDE SERPL-SCNC: 103 MMOL/L (ref 99–109)
CHOLEST SERPL-MCNC: 130 MG/DL (ref 0–200)
CO2 SERPL-SCNC: 33 MMOL/L (ref 20–31)
CREAT BLD-MCNC: 0.8 MG/DL (ref 0.6–1.3)
GFR SERPL CREATININE-BSD FRML MDRD: 98 ML/MIN/1.73
GLOBULIN UR ELPH-MCNC: 2.5 GM/DL
GLUCOSE BLD-MCNC: 98 MG/DL (ref 70–100)
HBA1C MFR BLD: 6.3 % (ref 4.8–5.6)
HDLC SERPL-MCNC: 58 MG/DL (ref 40–60)
POTASSIUM BLD-SCNC: 4.4 MMOL/L (ref 3.5–5.5)
PROT SERPL-MCNC: 6.8 G/DL (ref 5.7–8.2)
SODIUM BLD-SCNC: 137 MMOL/L (ref 132–146)
TRIGL SERPL-MCNC: 47 MG/DL (ref 0–150)

## 2018-10-10 PROCEDURE — 80053 COMPREHEN METABOLIC PANEL: CPT | Performed by: FAMILY MEDICINE

## 2018-10-10 PROCEDURE — 80061 LIPID PANEL: CPT | Performed by: FAMILY MEDICINE

## 2018-10-10 PROCEDURE — 82306 VITAMIN D 25 HYDROXY: CPT | Performed by: FAMILY MEDICINE

## 2018-10-10 PROCEDURE — 83036 HEMOGLOBIN GLYCOSYLATED A1C: CPT | Performed by: FAMILY MEDICINE

## 2018-10-10 PROCEDURE — 36415 COLL VENOUS BLD VENIPUNCTURE: CPT | Performed by: FAMILY MEDICINE

## 2018-10-10 NOTE — TELEPHONE ENCOUNTER
PT CAME IN AND SAID HE SEEN THAT [facility] IS ADVERTISING LUNG SCREENINGS AND HE WOULD LIKE TO GET A REFERRAL TO HAVE ONE DONE BECAUSE HIS LEFT LUNG HAS BEEN BOTHERING HIM FOR AWHILE.  PT SAID HE FORGOT TO MENTION IT TO PCP ON HIS LAST VISIT.     PLEASE CALL PT BACK AND LET HIM KNOW WHEN AND WHERE HE NEEDS TO [phone]

## 2018-10-26 RX ORDER — FINASTERIDE 5 MG/1
TABLET, FILM COATED ORAL
Qty: 90 TABLET | Refills: 0 | Status: SHIPPED | OUTPATIENT
Start: 2018-10-26 | End: 2021-08-17

## 2018-12-06 RX ORDER — DOXAZOSIN MESYLATE 4 MG/1
TABLET ORAL
Qty: 90 TABLET | Refills: 0 | Status: SHIPPED | OUTPATIENT
Start: 2018-12-06 | End: 2019-03-15 | Stop reason: SDUPTHER

## 2018-12-21 RX ORDER — ATORVASTATIN CALCIUM 80 MG/1
TABLET, FILM COATED ORAL
Qty: 90 TABLET | Refills: 0 | Status: SHIPPED | OUTPATIENT
Start: 2018-12-21 | End: 2019-03-27 | Stop reason: SDUPTHER

## 2019-01-23 ENCOUNTER — OFFICE VISIT (OUTPATIENT)
Dept: FAMILY MEDICINE CLINIC | Facility: CLINIC | Age: 64
End: 2019-01-23

## 2019-01-23 VITALS
SYSTOLIC BLOOD PRESSURE: 118 MMHG | WEIGHT: 195 LBS | HEIGHT: 72 IN | DIASTOLIC BLOOD PRESSURE: 74 MMHG | BODY MASS INDEX: 26.41 KG/M2 | HEART RATE: 88 BPM | OXYGEN SATURATION: 96 %

## 2019-01-23 DIAGNOSIS — E78.00 PURE HYPERCHOLESTEROLEMIA: ICD-10-CM

## 2019-01-23 DIAGNOSIS — R73.9 HYPERGLYCEMIA: Primary | ICD-10-CM

## 2019-01-23 LAB — HBA1C MFR BLD: 5.6 %

## 2019-01-23 PROCEDURE — 99214 OFFICE O/P EST MOD 30 MIN: CPT | Performed by: FAMILY MEDICINE

## 2019-01-23 PROCEDURE — 83036 HEMOGLOBIN GLYCOSYLATED A1C: CPT | Performed by: FAMILY MEDICINE

## 2019-03-15 RX ORDER — DOXAZOSIN MESYLATE 4 MG/1
TABLET ORAL
Qty: 90 TABLET | Refills: 0 | Status: SHIPPED | OUTPATIENT
Start: 2019-03-15 | End: 2019-06-26 | Stop reason: SDUPTHER

## 2019-03-27 RX ORDER — ATORVASTATIN CALCIUM 80 MG/1
TABLET, FILM COATED ORAL
Qty: 90 TABLET | Refills: 0 | Status: SHIPPED | OUTPATIENT
Start: 2019-03-27 | End: 2019-07-02 | Stop reason: SDUPTHER

## 2019-04-03 ENCOUNTER — OFFICE VISIT (OUTPATIENT)
Dept: FAMILY MEDICINE CLINIC | Facility: CLINIC | Age: 64
End: 2019-04-03

## 2019-04-03 ENCOUNTER — HOSPITAL ENCOUNTER (OUTPATIENT)
Dept: GENERAL RADIOLOGY | Facility: HOSPITAL | Age: 64
Discharge: HOME | End: 2019-04-03
Admitting: FAMILY MEDICINE

## 2019-04-03 ENCOUNTER — APPOINTMENT (OUTPATIENT)
Dept: LAB | Facility: HOSPITAL | Age: 64
End: 2019-04-03

## 2019-04-03 VITALS
OXYGEN SATURATION: 98 % | DIASTOLIC BLOOD PRESSURE: 82 MMHG | SYSTOLIC BLOOD PRESSURE: 122 MMHG | BODY MASS INDEX: 26.14 KG/M2 | WEIGHT: 193 LBS | HEIGHT: 72 IN | HEART RATE: 81 BPM

## 2019-04-03 DIAGNOSIS — K92.1 BLOOD IN STOOL: ICD-10-CM

## 2019-04-03 DIAGNOSIS — R07.9 CHEST PAIN, UNSPECIFIED TYPE: ICD-10-CM

## 2019-04-03 DIAGNOSIS — M79.671 BILATERAL FOOT PAIN: ICD-10-CM

## 2019-04-03 DIAGNOSIS — M79.671 BILATERAL FOOT PAIN: Primary | ICD-10-CM

## 2019-04-03 DIAGNOSIS — M79.672 BILATERAL FOOT PAIN: ICD-10-CM

## 2019-04-03 DIAGNOSIS — M79.672 BILATERAL FOOT PAIN: Primary | ICD-10-CM

## 2019-04-03 DIAGNOSIS — E78.00 PURE HYPERCHOLESTEROLEMIA: ICD-10-CM

## 2019-04-03 DIAGNOSIS — R06.02 SHORTNESS OF BREATH: ICD-10-CM

## 2019-04-03 DIAGNOSIS — R73.9 HYPERGLYCEMIA: ICD-10-CM

## 2019-04-03 LAB
ALBUMIN SERPL-MCNC: 4.14 G/DL (ref 3.2–4.8)
ALBUMIN/GLOB SERPL: 1.7 G/DL (ref 1.5–2.5)
ALP SERPL-CCNC: 67 U/L (ref 25–100)
ALT SERPL W P-5'-P-CCNC: 32 U/L (ref 7–40)
ANION GAP SERPL CALCULATED.3IONS-SCNC: 8 MMOL/L (ref 3–11)
ARTICHOKE IGE QN: 64 MG/DL (ref 0–130)
AST SERPL-CCNC: 29 U/L (ref 0–33)
BILIRUB SERPL-MCNC: 0.8 MG/DL (ref 0.3–1.2)
BUN BLD-MCNC: 19 MG/DL (ref 9–23)
BUN/CREAT SERPL: 24.4 (ref 7–25)
CALCIUM SPEC-SCNC: 9.3 MG/DL (ref 8.7–10.4)
CHLORIDE SERPL-SCNC: 105 MMOL/L (ref 99–109)
CHOLEST SERPL-MCNC: 132 MG/DL (ref 0–200)
CO2 SERPL-SCNC: 29 MMOL/L (ref 20–31)
CREAT BLD-MCNC: 0.78 MG/DL (ref 0.6–1.3)
GFR SERPL CREATININE-BSD FRML MDRD: 100 ML/MIN/1.73
GLOBULIN UR ELPH-MCNC: 2.5 GM/DL
GLUCOSE BLD-MCNC: 100 MG/DL (ref 70–100)
HDLC SERPL-MCNC: 59 MG/DL (ref 40–60)
POTASSIUM BLD-SCNC: 4.7 MMOL/L (ref 3.5–5.5)
PROT SERPL-MCNC: 6.6 G/DL (ref 5.7–8.2)
SODIUM BLD-SCNC: 142 MMOL/L (ref 132–146)
TRIGL SERPL-MCNC: 48 MG/DL (ref 0–150)

## 2019-04-03 PROCEDURE — 71046 X-RAY EXAM CHEST 2 VIEWS: CPT

## 2019-04-03 PROCEDURE — 80053 COMPREHEN METABOLIC PANEL: CPT | Performed by: FAMILY MEDICINE

## 2019-04-03 PROCEDURE — 73630 X-RAY EXAM OF FOOT: CPT

## 2019-04-03 PROCEDURE — 99214 OFFICE O/P EST MOD 30 MIN: CPT | Performed by: FAMILY MEDICINE

## 2019-04-03 PROCEDURE — 80061 LIPID PANEL: CPT | Performed by: FAMILY MEDICINE

## 2019-04-03 RX ORDER — ASPIRIN 325 MG
325 TABLET ORAL NIGHTLY
COMMUNITY

## 2019-04-03 NOTE — PROGRESS NOTES
Subjective   Anderson Goode is a 63 y.o. male.     Chief Complaint   Patient presents with   • Hyperlipidemia     Follow-up on labs   • Foot Pain     Both feet have been swelling & hurting making it difficult to walk.       History of Present Illness     Anderson Goode presents today for follow up on hyperglycemia and hyperlipidemia. His hyperglycemia has been mild but worsening over the past few years. He has substantial cardiac risk and is managed effectively on atorvastatin 20mg. His diet is generally adequate but he admits he does not get regular exercise.    The following portions of the patient's history were reviewed and updated as appropriate: allergies, current medications, past family history, past medical history, past social history, past surgical history and problem list.    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/29/2016   • Prostatism 07/29/2016   • Chronic pain of both shoulders 07/29/2016   • Vitamin D deficiency 07/29/2016   • Preventative health care 08/19/2016   • Colon adenomas 08/19/2016   • RBBB 08/21/2016   • Diverticulosis 12/24/2016     Resolved Ambulatory Problems     Diagnosis Date Noted   • Disorder of prostate 07/29/2016   • Shoulder injury 07/29/2016   • Cerumen impaction 04/30/2017     Past Medical History:   Diagnosis Date   • Allergic rhinitis    • Chronic low back pain 1988   • Colon adenomas 2016   • Diverticulosis 2016   • Elbow fracture, right 1964   • Generalized osteoarthritis 2005   • GERD (gastroesophageal reflux disease) Adulthood   • History of recurrent UTIs 2010   • Hyperlipidemia Adulthood   • Obesity 2013   • Prediabetes 2014   • Prostatism 2010   • RBBB    • Vitamin D deficiency      Past Surgical History:   Procedure Laterality Date   •  APPENDECTOMY  1964   • CARDIOVASCULAR STRESS TEST  2014    Nuclear stress test normal   • COLONOSCOPY W/ POLYPECTOMY  2016    Benign adenomas   • HEMORRHOIDECTOMY  , 's x 3     1990s with banding   • LIFT / REPAIR BROW PTOSIS FOREHEAD Bilateral    • LUMBAR EPIDURAL INJECTION  1988    Injections ×7 after back injury from fall   • TONSILLECTOMY     • WRIST FRACTURE SURGERY Right 2017    Open reduction internal fixation     Family History   Problem Relation Age of Onset   • Atrial fibrillation Mother    • Arthritis Mother    • Breast cancer Mother    • Diabetes Mother    • Heart attack Father          age 84   • COPD Father         tobacco user   • Other Father         MRSA   • Pneumonia Father    • Obesity Sister    • Esophagitis Sister    • Obesity Brother    • Drug abuse Other         Overdose   • Dementia Other    • Lumbar disc disease Other         Chronic pain syndrome     Social History     Socioeconomic History   • Marital status:      Spouse name: Not on file   • Number of children: Not on file   • Years of education: Not on file   • Highest education level: Not on file   Tobacco Use   • Smoking status: Never Smoker   • Smokeless tobacco: Never Used   Substance and Sexual Activity   • Alcohol use: Yes     Alcohol/week: 4.2 oz     Types: 7 Cans of beer per week     Comment: wife smoked 2 PPD - 34 years   • Drug use: No   • Sexual activity: Defer   Social History Narrative    Domestic life   lives in private home alone -  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                               Good support from local sister.                                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    Musculoskeletal:        Bilateral foot pain       Objective   Blood pressure 122/82, pulse 81, height 182.9 cm (72.01\"), weight 87.5 kg (193 lb), SpO2 98 %.  Nursing note reviewed  Physical Exam  Const: NAD, A&Ox4, Pleasant, Cooperative  Eyes: EOMI, no conjunctivitis  ENT: No nasal discharge present, neck supple  Cardiac: Regular rate and rhythm, no cyanosis  Resp: Respiratory rate within normal limits, no increased work of breathing, no audible wheezing or retractions noted  GI: No distention or ascites  MSK: Motor and sensation grossly intact in bilateral upper extremities  Neurologic: CN II-XII grossly intact  Psych: Appropriate mood and behavior.  Skin: Pink, warm, dry  Procedures  Assessment/Plan   Anderson was seen today for hyperlipidemia and foot pain.    Diagnoses and all orders for this visit:    Bilateral foot pain  Comments:  Use, Ace wrap compression  Orders:  -     XR Foot 3+ View Right; Future  -     XR Foot 3+ View Left; Future    Hyperglycemia  -     Comprehensive Metabolic Panel    Pure hypercholesterolemia  -     Comprehensive Metabolic Panel  -     Lipid Panel    Blood in stool  Comments:  Follow up with Dr. Hendrix    Chest pain, unspecified type  -     XR Chest 2 View; Future    Shortness of breath  -     Full Pulmonary Function Test With Bronchodilator; Future      Chronic health conditions:  #1 hyperglycemia  Lab Results   Component Value Date    HGBA1C 5.6 01/23/2019   His A1c went from 6.3% in October to 5.6% in January. He has improved well.    #2 HLD  Lab Results   Component Value Date    CHOL 132 04/03/2019    CHLPL 131 03/11/2016    TRIG 48 04/03/2019    HDL 59 04/03/2019    LDL 64 04/03/2019   His LDL has been excellent, under 70 consistently on atorvastatin.    #3 Vitamin D deficiency: Stable on supplementation, most recent level 33.9 on " 1000U daily    #4 blood in stool  F/U with Dr. Hendrix    #5 Bilat foot pain  XR today negative    #6 Chest pain, SOA  PFT, CXR ordered today    There are no Patient Instructions on file for this visit.    No Follow-up on file.    Ambulatory progress note signed and attested to by Luis Shields D.O.

## 2019-04-10 ENCOUNTER — OFFICE VISIT (OUTPATIENT)
Dept: PULMONOLOGY | Facility: CLINIC | Age: 64
End: 2019-04-10

## 2019-04-10 DIAGNOSIS — R06.02 SHORTNESS OF BREATH: ICD-10-CM

## 2019-04-10 PROCEDURE — 94375 RESPIRATORY FLOW VOLUME LOOP: CPT | Performed by: INTERNAL MEDICINE

## 2019-04-10 PROCEDURE — 94729 DIFFUSING CAPACITY: CPT | Performed by: INTERNAL MEDICINE

## 2019-04-10 PROCEDURE — 94726 PLETHYSMOGRAPHY LUNG VOLUMES: CPT | Performed by: INTERNAL MEDICINE

## 2019-04-26 ENCOUNTER — TELEPHONE (OUTPATIENT)
Dept: FAMILY MEDICINE CLINIC | Facility: CLINIC | Age: 64
End: 2019-04-26

## 2019-04-26 DIAGNOSIS — M79.672 BILATERAL FOOT PAIN: Primary | ICD-10-CM

## 2019-04-26 DIAGNOSIS — Z12.83 SKIN CANCER SCREENING: ICD-10-CM

## 2019-04-26 DIAGNOSIS — M19.079 ARTHRITIS OF MIDFOOT: ICD-10-CM

## 2019-04-26 DIAGNOSIS — M21.42 PES PLANUS OF BOTH FEET: ICD-10-CM

## 2019-04-26 DIAGNOSIS — M21.41 PES PLANUS OF BOTH FEET: ICD-10-CM

## 2019-04-26 DIAGNOSIS — M79.671 BILATERAL FOOT PAIN: Primary | ICD-10-CM

## 2019-04-26 NOTE — TELEPHONE ENCOUNTER
Foot x-rays showed flat feet bilaterally with dropped arches.  There is some arthritis in the midfoot, likely because of the flat arches.  I would recommend supportive shoes, custom inserts.  If pain and discomfort are not controllable by a these measures, a referral to podiatry may be reasonable.  And yes, a dermatology referral would be appropriate.  Is there a specific dermatologist he has in mind?

## 2019-04-26 NOTE — TELEPHONE ENCOUNTER
Patient called he has some questions regarding his X ray results, they tell him what is going on but no recommendations for treatments. He also wants to know if he needs a dermatology referral for a mole on his face that is changing shape and growing outward. Please call back at [phone]

## 2019-04-26 NOTE — TELEPHONE ENCOUNTER
Called patient and advised. Pt verbalized understanding.   Pt stated that his feet are getting better, however when he work is when the pain is unbearable. So maybe a podia    Pt also stated that he has not seen a dermatologist in years and would like to go to one through [facility] if we had one. If not he does not care where we send him.     Please advise

## 2019-05-03 RX ORDER — MOMETASONE FUROATE 50 UG/1
SPRAY, METERED NASAL
Qty: 1 EACH | Refills: 1 | Status: SHIPPED | OUTPATIENT
Start: 2019-05-03 | End: 2019-10-23 | Stop reason: SDUPTHER

## 2019-06-10 ENCOUNTER — TELEPHONE (OUTPATIENT)
Dept: FAMILY MEDICINE CLINIC | Facility: CLINIC | Age: 64
End: 2019-06-10

## 2019-06-10 NOTE — TELEPHONE ENCOUNTER
PER PT CALLED, WOULD LIKE TO KNOW THE RESULTS OF HIS RECENT BREATHING TEST AND LUNG X-RAY. PLEASE ADVISE.

## 2019-06-24 NOTE — TELEPHONE ENCOUNTER
Chest x-ray and pulmonary function testing were both negative for acute or chronic significant disease.

## 2019-06-25 NOTE — TELEPHONE ENCOUNTER
Contacted PT and provided test results. He stated that he is having some issues with breathing but it is not significant at the time. He will call if anything worsens

## 2019-06-26 RX ORDER — DOXAZOSIN MESYLATE 4 MG/1
TABLET ORAL
Qty: 90 TABLET | Refills: 1 | Status: SHIPPED | OUTPATIENT
Start: 2019-06-26 | End: 2020-01-09

## 2019-07-02 RX ORDER — ATORVASTATIN CALCIUM 80 MG/1
TABLET, FILM COATED ORAL
Qty: 90 TABLET | Refills: 0 | Status: SHIPPED | OUTPATIENT
Start: 2019-07-02 | End: 2019-10-03 | Stop reason: SDUPTHER

## 2019-08-07 ENCOUNTER — OFFICE VISIT (OUTPATIENT)
Dept: FAMILY MEDICINE CLINIC | Facility: CLINIC | Age: 64
End: 2019-08-07

## 2019-08-07 VITALS
DIASTOLIC BLOOD PRESSURE: 72 MMHG | WEIGHT: 194 LBS | SYSTOLIC BLOOD PRESSURE: 120 MMHG | HEIGHT: 72 IN | HEART RATE: 82 BPM | BODY MASS INDEX: 26.28 KG/M2 | OXYGEN SATURATION: 99 %

## 2019-08-07 DIAGNOSIS — J30.2 SEASONAL ALLERGIC RHINITIS, UNSPECIFIED TRIGGER: ICD-10-CM

## 2019-08-07 DIAGNOSIS — R59.0 LYMPHADENOPATHY, CERVICAL: ICD-10-CM

## 2019-08-07 DIAGNOSIS — R06.02 SHORTNESS OF BREATH: Primary | ICD-10-CM

## 2019-08-07 PROCEDURE — 99214 OFFICE O/P EST MOD 30 MIN: CPT | Performed by: FAMILY MEDICINE

## 2019-08-07 RX ORDER — MONTELUKAST SODIUM 10 MG/1
10 TABLET ORAL NIGHTLY
Qty: 30 TABLET | Refills: 5 | Status: SHIPPED | OUTPATIENT
Start: 2019-08-07 | End: 2020-08-24

## 2019-08-07 NOTE — PROGRESS NOTES
Subjective   Anderson Goode is a 63 y.o. male.     Chief Complaint   Patient presents with   • Follow-up       History of Present Illness     Anderson Goode presents today for shortness of breath and seasonal allergies.  These have been unable to be controlled with over-the-counter medications thus far.  He has noticed swelling in the right side of his neck.    The following portions of the patient's history were reviewed and updated as appropriate: allergies, current medications, past family history, past medical history, past social history, past surgical history and problem list.    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/29/2016   • Prostatism 07/29/2016   • Chronic pain of both shoulders 07/29/2016   • Vitamin D deficiency 07/29/2016   • Preventative health care 08/19/2016   • Colon adenomas 08/19/2016   • RBBB 08/21/2016   • Diverticulosis 12/24/2016     Resolved Ambulatory Problems     Diagnosis Date Noted   • Disorder of prostate 07/29/2016   • Shoulder injury 07/29/2016   • Cerumen impaction 04/30/2017     Past Medical History:   Diagnosis Date   • Allergic rhinitis    • Chronic low back pain 1988   • Colon adenomas 2016   • Diverticulosis 2016   • Elbow fracture, right 1964   • Generalized osteoarthritis 2005   • GERD (gastroesophageal reflux disease) Adulthood   • History of recurrent UTIs 2010   • Hyperlipidemia Adulthood   • Obesity 2013   • Prediabetes 2014   • Prostatism 2010   • RBBB    • Vitamin D deficiency      Past Surgical History:   Procedure Laterality Date   • APPENDECTOMY  1964   • CARDIOVASCULAR STRESS TEST  2014    Nuclear stress test normal   • COLONOSCOPY W/ POLYPECTOMY  2016    Benign adenomas   • HEMORRHOIDECTOMY  1974, 1990's x 3     1990s with banding   •  LIFT / REPAIR BROW PTOSIS FOREHEAD Bilateral    • LUMBAR EPIDURAL INJECTION  1988    Injections ×7 after back injury from fall   • TONSILLECTOMY     • WRIST FRACTURE SURGERY Right 2017    Open reduction internal fixation     Family History   Problem Relation Age of Onset   • Atrial fibrillation Mother    • Arthritis Mother    • Breast cancer Mother    • Diabetes Mother    • Heart attack Father          age 84   • COPD Father         tobacco user   • Other Father         MRSA   • Pneumonia Father    • Obesity Sister    • Esophagitis Sister    • Obesity Brother    • Drug abuse Other         Overdose   • Dementia Other    • Lumbar disc disease Other         Chronic pain syndrome     Social History     Socioeconomic History   • Marital status:      Spouse name: Not on file   • Number of children: Not on file   • Years of education: Not on file   • Highest education level: Not on file   Tobacco Use   • Smoking status: Never Smoker   • Smokeless tobacco: Never Used   Substance and Sexual Activity   • Alcohol use: Yes     Alcohol/week: 4.2 oz     Types: 7 Cans of beer per week     Comment: wife smoked 2 PPD - 34 years   • Drug use: No   • Sexual activity: Defer   Social History Narrative    Domestic life   lives in private home alone -  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                               Good support from local sister.                                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    HENT: Positive for congestion, postnasal drip and sinus  "pressure. Negative for hearing loss, rhinorrhea, sneezing, tinnitus and trouble swallowing.    Respiratory: Positive for cough. Negative for shortness of breath and wheezing.    Cardiovascular: Negative.    Gastrointestinal: Negative.        Objective   Blood pressure 120/72, pulse 82, height 182.9 cm (72.01\"), weight 88 kg (194 lb), SpO2 99 %.  Nursing note reviewed  Physical Exam  Const: NAD, A&Ox4, Pleasant, Cooperative  Eyes: EOMI, no conjunctivitis  ENT: No nasal discharge present, neck supple.  There is moderate bilateral cervical lymphadenopathy  Cardiac: Regular rate and rhythm, no cyanosis  Resp: Respiratory rate within normal limits, no increased work of breathing, no audible wheezing or retractions noted  GI: No distention or ascites  MSK: Motor and sensation grossly intact in bilateral upper extremities  Neurologic: CN II-XII grossly intact  Psych: Appropriate mood and behavior.  Skin: Pink, warm, dry  Procedures  Assessment/Plan   Anderson was seen today for follow-up.    Diagnoses and all orders for this visit:    Shortness of breath  -     montelukast (SINGULAIR) 10 MG tablet; Take 1 tablet by mouth Every Night.    Seasonal allergic rhinitis, unspecified trigger  -     montelukast (SINGULAIR) 10 MG tablet; Take 1 tablet by mouth Every Night.    Lymphadenopathy, cervical  -     montelukast (SINGULAIR) 10 MG tablet; Take 1 tablet by mouth Every Night.        Patient Instructions   1.  Start Singulair daily in addition to steroid nasal spray    2.  Call in 2 weeks with status      Return in about 6 months (around 2/7/2020) for Annual.    Ambulatory progress note signed and attested to by Luis Shields D.O.             "

## 2019-08-21 ENCOUNTER — TELEPHONE (OUTPATIENT)
Dept: FAMILY MEDICINE CLINIC | Facility: CLINIC | Age: 64
End: 2019-08-21

## 2019-08-21 NOTE — TELEPHONE ENCOUNTER
PATIENT BROUGHT IN HEALTH SCREENING AUTH RELEASE FORM. NO CHARGE FOR THIS. WE ARE TO FAX WHEN SIGNED. PLACED IN DR RIDDLE FOLDER UP FRONT.

## 2019-10-04 RX ORDER — ATORVASTATIN CALCIUM 80 MG/1
TABLET, FILM COATED ORAL
Qty: 90 TABLET | Refills: 1 | Status: SHIPPED | OUTPATIENT
Start: 2019-10-04 | End: 2020-04-10

## 2019-10-24 RX ORDER — MOMETASONE FUROATE 50 UG/1
SPRAY, METERED NASAL
Qty: 1 EACH | Refills: 2 | Status: SHIPPED | OUTPATIENT
Start: 2019-10-24 | End: 2020-06-30

## 2020-01-09 RX ORDER — DOXAZOSIN MESYLATE 4 MG/1
TABLET ORAL
Qty: 90 TABLET | Refills: 0 | Status: SHIPPED | OUTPATIENT
Start: 2020-01-09 | End: 2020-04-10

## 2020-02-12 ENCOUNTER — OFFICE VISIT (OUTPATIENT)
Dept: FAMILY MEDICINE CLINIC | Facility: CLINIC | Age: 65
End: 2020-02-12

## 2020-02-12 ENCOUNTER — APPOINTMENT (OUTPATIENT)
Dept: LAB | Facility: HOSPITAL | Age: 65
End: 2020-02-12

## 2020-02-12 VITALS
OXYGEN SATURATION: 98 % | DIASTOLIC BLOOD PRESSURE: 70 MMHG | BODY MASS INDEX: 26.55 KG/M2 | WEIGHT: 196 LBS | SYSTOLIC BLOOD PRESSURE: 122 MMHG | HEIGHT: 72 IN | HEART RATE: 70 BPM

## 2020-02-12 DIAGNOSIS — H02.539 LID LAG: ICD-10-CM

## 2020-02-12 DIAGNOSIS — E78.00 PURE HYPERCHOLESTEROLEMIA: ICD-10-CM

## 2020-02-12 DIAGNOSIS — K21.9 GASTROESOPHAGEAL REFLUX DISEASE WITHOUT ESOPHAGITIS: ICD-10-CM

## 2020-02-12 DIAGNOSIS — J32.4 CHRONIC PANSINUSITIS: ICD-10-CM

## 2020-02-12 DIAGNOSIS — R73.9 HYPERGLYCEMIA: ICD-10-CM

## 2020-02-12 DIAGNOSIS — R59.0 LYMPHADENOPATHY, CERVICAL: ICD-10-CM

## 2020-02-12 DIAGNOSIS — K57.90 DIVERTICULOSIS: ICD-10-CM

## 2020-02-12 DIAGNOSIS — J30.2 SEASONAL ALLERGIC RHINITIS, UNSPECIFIED TRIGGER: ICD-10-CM

## 2020-02-12 DIAGNOSIS — Z00.00 PREVENTATIVE HEALTH CARE: Primary | ICD-10-CM

## 2020-02-12 DIAGNOSIS — I45.10 RBBB: ICD-10-CM

## 2020-02-12 DIAGNOSIS — I44.4 LEFT ANTERIOR FASCICULAR BLOCK: ICD-10-CM

## 2020-02-12 DIAGNOSIS — N40.0 PROSTATISM: ICD-10-CM

## 2020-02-12 LAB
ALBUMIN SERPL-MCNC: 4.4 G/DL (ref 3.5–5.2)
ALBUMIN/GLOB SERPL: 1.3 G/DL
ALP SERPL-CCNC: 65 U/L (ref 39–117)
ALT SERPL W P-5'-P-CCNC: 24 U/L (ref 1–41)
ANION GAP SERPL CALCULATED.3IONS-SCNC: 11.7 MMOL/L (ref 5–15)
AST SERPL-CCNC: 30 U/L (ref 1–40)
BASOPHILS # BLD AUTO: 0.04 10*3/MM3 (ref 0–0.2)
BASOPHILS NFR BLD AUTO: 0.6 % (ref 0–1.5)
BILIRUB SERPL-MCNC: 0.6 MG/DL (ref 0.2–1.2)
BUN BLD-MCNC: 17 MG/DL (ref 8–23)
BUN/CREAT SERPL: 22.1 (ref 7–25)
CALCIUM SPEC-SCNC: 9.3 MG/DL (ref 8.6–10.5)
CHLORIDE SERPL-SCNC: 103 MMOL/L (ref 98–107)
CHOLEST SERPL-MCNC: 136 MG/DL (ref 0–200)
CO2 SERPL-SCNC: 27.3 MMOL/L (ref 22–29)
CREAT BLD-MCNC: 0.77 MG/DL (ref 0.76–1.27)
DEPRECATED RDW RBC AUTO: 42.9 FL (ref 37–54)
EOSINOPHIL # BLD AUTO: 0.15 10*3/MM3 (ref 0–0.4)
EOSINOPHIL NFR BLD AUTO: 2.1 % (ref 0.3–6.2)
ERYTHROCYTE [DISTWIDTH] IN BLOOD BY AUTOMATED COUNT: 12.5 % (ref 12.3–15.4)
GFR SERPL CREATININE-BSD FRML MDRD: 101 ML/MIN/1.73
GLOBULIN UR ELPH-MCNC: 3.3 GM/DL
GLUCOSE BLD-MCNC: 102 MG/DL (ref 65–99)
HCT VFR BLD AUTO: 42 % (ref 37.5–51)
HDLC SERPL-MCNC: 65 MG/DL (ref 40–60)
HGB BLD-MCNC: 14.1 G/DL (ref 13–17.7)
IMM GRANULOCYTES # BLD AUTO: 0.02 10*3/MM3 (ref 0–0.05)
IMM GRANULOCYTES NFR BLD AUTO: 0.3 % (ref 0–0.5)
LDLC SERPL CALC-MCNC: 64 MG/DL (ref 0–100)
LDLC/HDLC SERPL: 0.99 {RATIO}
LYMPHOCYTES # BLD AUTO: 1.28 10*3/MM3 (ref 0.7–3.1)
LYMPHOCYTES NFR BLD AUTO: 17.9 % (ref 19.6–45.3)
MCH RBC QN AUTO: 31.6 PG (ref 26.6–33)
MCHC RBC AUTO-ENTMCNC: 33.6 G/DL (ref 31.5–35.7)
MCV RBC AUTO: 94.2 FL (ref 79–97)
MONOCYTES # BLD AUTO: 0.62 10*3/MM3 (ref 0.1–0.9)
MONOCYTES NFR BLD AUTO: 8.6 % (ref 5–12)
NEUTROPHILS # BLD AUTO: 5.06 10*3/MM3 (ref 1.7–7)
NEUTROPHILS NFR BLD AUTO: 70.5 % (ref 42.7–76)
NRBC BLD AUTO-RTO: 0 /100 WBC (ref 0–0.2)
PLATELET # BLD AUTO: 208 10*3/MM3 (ref 140–450)
PMV BLD AUTO: 10.3 FL (ref 6–12)
POTASSIUM BLD-SCNC: 4.8 MMOL/L (ref 3.5–5.2)
PROT SERPL-MCNC: 7.7 G/DL (ref 6–8.5)
PSA SERPL-MCNC: 2.23 NG/ML (ref 0–4)
RBC # BLD AUTO: 4.46 10*6/MM3 (ref 4.14–5.8)
SODIUM BLD-SCNC: 142 MMOL/L (ref 136–145)
TRIGL SERPL-MCNC: 34 MG/DL (ref 0–150)
TSH SERPL DL<=0.05 MIU/L-ACNC: 1.65 UIU/ML (ref 0.27–4.2)
VLDLC SERPL-MCNC: 6.8 MG/DL (ref 5–40)
WBC NRBC COR # BLD: 7.17 10*3/MM3 (ref 3.4–10.8)

## 2020-02-12 PROCEDURE — 99397 PER PM REEVAL EST PAT 65+ YR: CPT | Performed by: FAMILY MEDICINE

## 2020-02-12 PROCEDURE — 82607 VITAMIN B-12: CPT | Performed by: FAMILY MEDICINE

## 2020-02-12 PROCEDURE — G0103 PSA SCREENING: HCPCS | Performed by: FAMILY MEDICINE

## 2020-02-12 PROCEDURE — 83036 HEMOGLOBIN GLYCOSYLATED A1C: CPT | Performed by: FAMILY MEDICINE

## 2020-02-12 PROCEDURE — 93000 ELECTROCARDIOGRAM COMPLETE: CPT | Performed by: FAMILY MEDICINE

## 2020-02-12 PROCEDURE — 80053 COMPREHEN METABOLIC PANEL: CPT | Performed by: FAMILY MEDICINE

## 2020-02-12 PROCEDURE — 86141 C-REACTIVE PROTEIN HS: CPT | Performed by: FAMILY MEDICINE

## 2020-02-12 PROCEDURE — 81003 URINALYSIS AUTO W/O SCOPE: CPT | Performed by: FAMILY MEDICINE

## 2020-02-12 PROCEDURE — 85025 COMPLETE CBC W/AUTO DIFF WBC: CPT | Performed by: FAMILY MEDICINE

## 2020-02-12 PROCEDURE — 80061 LIPID PANEL: CPT | Performed by: FAMILY MEDICINE

## 2020-02-12 PROCEDURE — 84443 ASSAY THYROID STIM HORMONE: CPT | Performed by: FAMILY MEDICINE

## 2020-02-12 PROCEDURE — 82306 VITAMIN D 25 HYDROXY: CPT | Performed by: FAMILY MEDICINE

## 2020-02-12 NOTE — PROGRESS NOTES
Subjective   Anderson Goode is a 64 y.o. male.     Chief Complaint   Patient presents with   • Annual Exam       History of Present Illness     Anderson Goode presents today for   Chief Complaint   Patient presents with   • Annual Exam     He is here today to follow-up on hyperglycemia and hyperlipidemia.  His hyperglycemia has been mild but worsening over the past few years.  He has substantial cardiac risk and is managed effectively on atorvastatin 80 mg.  His diet is generally adequate but he admits he does not get regular exercise.  He was most recently seen in August for shortness of breath and seasonal allergies.  At that time he was started on Singulair in addition to his steroid nasal spray.  He reports that he did not  the Singulair and has been using Zyrtec with fair effect.  He reports chronic sinus symptoms.  He had surgery for lid lag on the right a number of years ago, and feels that it is starting to return.  He has not seen his regular eye doctor and almost 3 years.    This patient is accompanied by their self who contributes to the history of their care.    The following portions of the patient's history were reviewed and updated as appropriate: allergies, current medications, past family history, past medical history, past social history, past surgical history and problem list.    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/29/2016   • Prostatism 07/29/2016   • Chronic pain of both shoulders 07/29/2016   • Vitamin D deficiency 07/29/2016   • Preventative health care 08/19/2016   • Colon adenomas 08/19/2016   • RBBB 08/21/2016   • Diverticulosis 12/24/2016     Resolved Ambulatory Problems     Diagnosis Date Noted   • Disorder of prostate 07/29/2016   • Shoulder injury  2016   • Cerumen impaction 2017     Past Medical History:   Diagnosis Date   • Allergic rhinitis    • Chronic low back pain    • Elbow fracture, right    • GERD (gastroesophageal reflux disease) Adulthood   • History of recurrent UTIs    • Obesity    • Prediabetes      Past Surgical History:   Procedure Laterality Date   • APPENDECTOMY     • CARDIOVASCULAR STRESS TEST  2014    Nuclear stress test normal   • COLONOSCOPY W/ POLYPECTOMY  2016    Benign adenomas   • HEMORRHOIDECTOMY  , 's x 3      with banding   • LIFT / REPAIR BROW PTOSIS FOREHEAD Bilateral    • LUMBAR EPIDURAL INJECTION      Injections ×7 after back injury from fall   • TONSILLECTOMY     • WRIST FRACTURE SURGERY Right 2017    Open reduction internal fixation     Family History   Problem Relation Age of Onset   • Atrial fibrillation Mother    • Arthritis Mother    • Breast cancer Mother    • Diabetes Mother    • Heart attack Father          age 84   • COPD Father         tobacco user   • Other Father         MRSA   • Pneumonia Father    • Obesity Sister    • Esophagitis Sister    • Obesity Brother    • Drug abuse Other         Overdose   • Dementia Other    • Lumbar disc disease Other         Chronic pain syndrome     Social History     Socioeconomic History   • Marital status:      Spouse name: Not on file   • Number of children: Not on file   • Years of education: Not on file   • Highest education level: Not on file   Tobacco Use   • Smoking status: Never Smoker   • Smokeless tobacco: Never Used   Substance and Sexual Activity   • Alcohol use: Yes     Alcohol/week: 7.0 standard drinks     Types: 7 Cans of beer per week     Comment: wife smoked 2 PPD - 34 years   • Drug use: No   • Sexual activity: Defer   Social History Narrative    Domestic life   lives in private home alone -  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                               Good  "support from local sister.                                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         Hearing : No impairment        Vision : Corrects with glasses        Driving : No limitations       Review of Systems  Review of Systems -  General ROS: negative for - chills, fever or night sweats  ENT ROS: Positive for chronic sinus congestion, chronic postnasal drainage  Cardiovascular ROS: no chest pain or dyspnea on exertion  Gastrointestinal ROS: no abdominal pain, change in bowel habits, or black or bloody stools  Genito-Urinary ROS: no trouble voiding or gross hematuria    Objective   Blood pressure 122/70, pulse 70, height 182.9 cm (72.01\"), weight 88.9 kg (196 lb), SpO2 98 %.  Nursing note reviewed  Physical Exam  Const: NAD, A&Ox4, Pleasant, Cooperative  Eyes: EOMI, no conjunctivitis  ENT: No nasal discharge present, neck supple.  Maxillary sinuses non-tender.  He has significant submandibular and anterior cervical lymphadenopathy bilaterally greater than 3 cm  Cardiac: Regular rate and rhythm, no cyanosis  Resp: Respiratory rate within normal limits, no increased work of breathing, no audible wheezing or retractions noted  GI: No distention or ascites    ECG 12 Lead  Date/Time: 2/12/2020 11:57 AM  Performed by: Luis Shields DO  Authorized by: Luis Shields DO   Comparison: compared with previous ECG   Similar to previous ECG  Rhythm: sinus bradycardia  Rate: bradycardic  BPM: 57  Conduction: right bundle branch block  ST Segments: ST segments normal  T Waves: T waves normal  QRS axis: left  Other: no other findings    Clinical impression: abnormal EKG  Comments: Baseline EKG unchanged from previous          Assessment/Plan "     Problem List Items Addressed This Visit        Cardiovascular and Mediastinum    Hyperlipidemia    Relevant Orders    Comprehensive Metabolic Panel    CBC & Differential    High Sensitivity CRP    Hemoglobin A1c    Lipid Panel    TSH Rfx On Abnormal To Free T4    Urinalysis With Microscopic If Indicated (No Culture) - Urine, Clean Catch    Vitamin D 25 Hydroxy    Vitamin B12    PSA Screen    CBC Auto Differential    Left anterior fascicular block    RBBB       Respiratory    Atopic rhinitis       Digestive    Gastroesophageal reflux disease    Diverticulosis       Genitourinary    Prostatism       Other    Hyperglycemia    Relevant Orders    Comprehensive Metabolic Panel    CBC & Differential    High Sensitivity CRP    Hemoglobin A1c    Lipid Panel    TSH Rfx On Abnormal To Free T4    Urinalysis With Microscopic If Indicated (No Culture) - Urine, Clean Catch    Vitamin D 25 Hydroxy    Vitamin B12    PSA Screen    CBC Auto Differential    Preventative health care - Primary    Relevant Orders    ECG 12 Lead    Comprehensive Metabolic Panel    CBC & Differential    High Sensitivity CRP    Hemoglobin A1c    Lipid Panel    TSH Rfx On Abnormal To Free T4    Urinalysis With Microscopic If Indicated (No Culture) - Urine, Clean Catch    Vitamin D 25 Hydroxy    Vitamin B12    PSA Screen    CBC Auto Differential      Other Visit Diagnoses     Chronic pansinusitis        Referral to ENT placed today, recommended continue Zyrtec and nasal steroid, consider Singulair    Lymphadenopathy, cervical        Has been persistent, possibly due to chronic sinusitis    Lid lag        Recommended that he see his ophthalmologist to consider surgery        See patient diagnoses and orders along with patient instructions for assessment, plan, and changes to care for patient.    The preventative exam has been reviewed in detail.  The patient has been fully counseled on preventative guidelines for vaccines, cancer screenings, and other  health maintenance needs. The patient was counseled on maintaining a lifestyle to promote good health and to minimize chronic diseases.  The patient has been assisted with scheduling healthcare procedures for the coming year and given a written document outlining these recommendations. Age-appropriate screening measures have been ordered for the patient today as indicated above.    There are no Patient Instructions on file for this visit.    Return in about 6 months (around 8/12/2020).    Ambulatory progress note signed and attested to by Luis Shields D.O.

## 2020-02-13 LAB
25(OH)D3 SERPL-MCNC: 46.7 NG/ML (ref 30–100)
BILIRUB UR QL STRIP: NEGATIVE
CLARITY UR: CLEAR
COLOR UR: YELLOW
GLUCOSE UR STRIP-MCNC: NEGATIVE MG/DL
HBA1C MFR BLD: 6.06 % (ref 4.8–5.6)
HGB UR QL STRIP.AUTO: NEGATIVE
KETONES UR QL STRIP: NEGATIVE
LEUKOCYTE ESTERASE UR QL STRIP.AUTO: NEGATIVE
NITRITE UR QL STRIP: NEGATIVE
PH UR STRIP.AUTO: 6.5 [PH] (ref 5–8)
PROT UR QL STRIP: NEGATIVE
SP GR UR STRIP: 1.01 (ref 1–1.03)
UROBILINOGEN UR QL STRIP: NORMAL
VIT B12 BLD-MCNC: 937 PG/ML (ref 211–946)

## 2020-02-14 LAB — CRP SERPL-MCNC: 0.08 MG/DL (ref 0.01–0.5)

## 2020-04-10 RX ORDER — DOXAZOSIN MESYLATE 4 MG/1
TABLET ORAL
Qty: 90 TABLET | Refills: 0 | Status: SHIPPED | OUTPATIENT
Start: 2020-04-10 | End: 2020-07-16

## 2020-04-10 RX ORDER — ATORVASTATIN CALCIUM 80 MG/1
TABLET, FILM COATED ORAL
Qty: 90 TABLET | Refills: 0 | Status: SHIPPED | OUTPATIENT
Start: 2020-04-10 | End: 2020-07-16

## 2020-06-30 RX ORDER — MOMETASONE FUROATE 50 UG/1
SPRAY, METERED NASAL
Qty: 17 EACH | Refills: 1 | Status: SHIPPED | OUTPATIENT
Start: 2020-06-30 | End: 2020-08-12 | Stop reason: SDUPTHER

## 2020-07-16 RX ORDER — DOXAZOSIN MESYLATE 4 MG/1
TABLET ORAL
Qty: 90 TABLET | Refills: 0 | Status: SHIPPED | OUTPATIENT
Start: 2020-07-16 | End: 2020-10-14

## 2020-07-16 RX ORDER — ATORVASTATIN CALCIUM 80 MG/1
TABLET, FILM COATED ORAL
Qty: 90 TABLET | Refills: 0 | Status: SHIPPED | OUTPATIENT
Start: 2020-07-16 | End: 2020-10-14

## 2020-07-17 ENCOUNTER — APPOINTMENT (OUTPATIENT)
Dept: GENERAL RADIOLOGY | Facility: HOSPITAL | Age: 65
End: 2020-07-17

## 2020-07-17 ENCOUNTER — HOSPITAL ENCOUNTER (EMERGENCY)
Facility: HOSPITAL | Age: 65
Discharge: HOME | End: 2020-07-17
Attending: EMERGENCY MEDICINE | Admitting: EMERGENCY MEDICINE

## 2020-07-17 ENCOUNTER — TELEPHONE (OUTPATIENT)
Dept: FAMILY MEDICINE CLINIC | Facility: CLINIC | Age: 65
End: 2020-07-17

## 2020-07-17 VITALS
OXYGEN SATURATION: 93 % | BODY MASS INDEX: 26.28 KG/M2 | HEART RATE: 70 BPM | WEIGHT: 194 LBS | SYSTOLIC BLOOD PRESSURE: 139 MMHG | RESPIRATION RATE: 16 BRPM | HEIGHT: 72 IN | DIASTOLIC BLOOD PRESSURE: 81 MMHG | TEMPERATURE: 98.9 F

## 2020-07-17 DIAGNOSIS — R50.9 FEBRILE ILLNESS: ICD-10-CM

## 2020-07-17 DIAGNOSIS — Z20.822 EXPOSURE TO 2019 NOVEL CORONAVIRUS: Primary | ICD-10-CM

## 2020-07-17 DIAGNOSIS — B34.9 VIRAL SYNDROME: ICD-10-CM

## 2020-07-17 LAB
ALBUMIN SERPL-MCNC: 3.9 G/DL (ref 3.5–5.2)
ALBUMIN/GLOB SERPL: 1.1 G/DL
ALP SERPL-CCNC: 77 U/L (ref 39–117)
ALT SERPL W P-5'-P-CCNC: 49 U/L (ref 1–41)
ANION GAP SERPL CALCULATED.3IONS-SCNC: 13 MMOL/L (ref 5–15)
AST SERPL-CCNC: 43 U/L (ref 1–40)
B PARAPERT DNA SPEC QL NAA+PROBE: NOT DETECTED
B PERT DNA SPEC QL NAA+PROBE: NOT DETECTED
BACTERIA UR QL AUTO: ABNORMAL /HPF
BASOPHILS # BLD AUTO: 0.02 10*3/MM3 (ref 0–0.2)
BASOPHILS NFR BLD AUTO: 0.2 % (ref 0–1.5)
BILIRUB SERPL-MCNC: 0.6 MG/DL (ref 0–1.2)
BILIRUB UR QL STRIP: NEGATIVE
BUN SERPL-MCNC: 19 MG/DL (ref 8–23)
BUN/CREAT SERPL: 24.1 (ref 7–25)
C PNEUM DNA NPH QL NAA+NON-PROBE: NOT DETECTED
CALCIUM SPEC-SCNC: 8.8 MG/DL (ref 8.6–10.5)
CHLORIDE SERPL-SCNC: 99 MMOL/L (ref 98–107)
CLARITY UR: CLEAR
CO2 SERPL-SCNC: 23 MMOL/L (ref 22–29)
COLOR UR: ABNORMAL
CREAT SERPL-MCNC: 0.79 MG/DL (ref 0.76–1.27)
D DIMER PPP FEU-MCNC: 1.49 MCGFEU/ML (ref 0–0.56)
DEPRECATED RDW RBC AUTO: 46 FL (ref 37–54)
EOSINOPHIL # BLD AUTO: 0.11 10*3/MM3 (ref 0–0.4)
EOSINOPHIL NFR BLD AUTO: 1.3 % (ref 0.3–6.2)
ERYTHROCYTE [DISTWIDTH] IN BLOOD BY AUTOMATED COUNT: 13.3 % (ref 12.3–15.4)
FLUAV H1 2009 PAND RNA NPH QL NAA+PROBE: NOT DETECTED
FLUAV H1 HA GENE NPH QL NAA+PROBE: NOT DETECTED
FLUAV H3 RNA NPH QL NAA+PROBE: NOT DETECTED
FLUAV SUBTYP SPEC NAA+PROBE: NOT DETECTED
FLUBV RNA ISLT QL NAA+PROBE: NOT DETECTED
GFR SERPL CREATININE-BSD FRML MDRD: 98 ML/MIN/1.73
GLOBULIN UR ELPH-MCNC: 3.4 GM/DL
GLUCOSE SERPL-MCNC: 136 MG/DL (ref 65–99)
GLUCOSE UR STRIP-MCNC: NEGATIVE MG/DL
HADV DNA SPEC NAA+PROBE: NOT DETECTED
HCOV 229E RNA SPEC QL NAA+PROBE: NOT DETECTED
HCOV HKU1 RNA SPEC QL NAA+PROBE: NOT DETECTED
HCOV NL63 RNA SPEC QL NAA+PROBE: NOT DETECTED
HCOV OC43 RNA SPEC QL NAA+PROBE: NOT DETECTED
HCT VFR BLD AUTO: 40.8 % (ref 37.5–51)
HGB BLD-MCNC: 13.6 G/DL (ref 13–17.7)
HGB UR QL STRIP.AUTO: NEGATIVE
HMPV RNA NPH QL NAA+NON-PROBE: NOT DETECTED
HOLD SPECIMEN: NORMAL
HOLD SPECIMEN: NORMAL
HPIV1 RNA SPEC QL NAA+PROBE: NOT DETECTED
HPIV2 RNA SPEC QL NAA+PROBE: NOT DETECTED
HPIV3 RNA NPH QL NAA+PROBE: NOT DETECTED
HPIV4 P GENE NPH QL NAA+PROBE: NOT DETECTED
HYALINE CASTS UR QL AUTO: ABNORMAL /LPF
IMM GRANULOCYTES # BLD AUTO: 0.03 10*3/MM3 (ref 0–0.05)
IMM GRANULOCYTES NFR BLD AUTO: 0.4 % (ref 0–0.5)
KETONES UR QL STRIP: ABNORMAL
LDH SERPL-CCNC: 199 U/L (ref 135–225)
LEUKOCYTE ESTERASE UR QL STRIP.AUTO: NEGATIVE
LIPASE SERPL-CCNC: 13 U/L (ref 13–60)
LYMPHOCYTES # BLD AUTO: 0.66 10*3/MM3 (ref 0.7–3.1)
LYMPHOCYTES NFR BLD AUTO: 8 % (ref 19.6–45.3)
M PNEUMO IGG SER IA-ACNC: NOT DETECTED
MCH RBC QN AUTO: 31.5 PG (ref 26.6–33)
MCHC RBC AUTO-ENTMCNC: 33.3 G/DL (ref 31.5–35.7)
MCV RBC AUTO: 94.4 FL (ref 79–97)
MONOCYTES # BLD AUTO: 0.64 10*3/MM3 (ref 0.1–0.9)
MONOCYTES NFR BLD AUTO: 7.7 % (ref 5–12)
NEUTROPHILS NFR BLD AUTO: 6.8 10*3/MM3 (ref 1.7–7)
NEUTROPHILS NFR BLD AUTO: 82.4 % (ref 42.7–76)
NITRITE UR QL STRIP: NEGATIVE
NRBC BLD AUTO-RTO: 0 /100 WBC (ref 0–0.2)
PH UR STRIP.AUTO: 5.5 [PH] (ref 5–8)
PLATELET # BLD AUTO: 162 10*3/MM3 (ref 140–450)
PMV BLD AUTO: 9.6 FL (ref 6–12)
POTASSIUM SERPL-SCNC: 4.1 MMOL/L (ref 3.5–5.2)
PROCALCITONIN SERPL-MCNC: 0.13 NG/ML (ref 0–0.25)
PROT SERPL-MCNC: 7.3 G/DL (ref 6–8.5)
PROT UR QL STRIP: ABNORMAL
RBC # BLD AUTO: 4.32 10*6/MM3 (ref 4.14–5.8)
RBC # UR: ABNORMAL /HPF
REF LAB TEST METHOD: ABNORMAL
RHINOVIRUS RNA SPEC NAA+PROBE: NOT DETECTED
RSV RNA NPH QL NAA+NON-PROBE: NOT DETECTED
SODIUM SERPL-SCNC: 135 MMOL/L (ref 136–145)
SP GR UR STRIP: 1.03 (ref 1–1.03)
SQUAMOUS #/AREA URNS HPF: ABNORMAL /HPF
UROBILINOGEN UR QL STRIP: ABNORMAL
WBC # BLD AUTO: 8.26 10*3/MM3 (ref 3.4–10.8)
WBC UR QL AUTO: ABNORMAL /HPF
WHOLE BLOOD HOLD SPECIMEN: NORMAL
WHOLE BLOOD HOLD SPECIMEN: NORMAL

## 2020-07-17 PROCEDURE — 63710000001 ONDANSETRON ODT 4 MG TABLET DISPERSIBLE: Performed by: EMERGENCY MEDICINE

## 2020-07-17 PROCEDURE — 0100U HC BIOFIRE FILMARRAY RESP PANEL 2: CPT | Performed by: EMERGENCY MEDICINE

## 2020-07-17 PROCEDURE — 80053 COMPREHEN METABOLIC PANEL: CPT

## 2020-07-17 PROCEDURE — U0004 COV-19 TEST NON-CDC HGH THRU: HCPCS | Performed by: EMERGENCY MEDICINE

## 2020-07-17 PROCEDURE — U0002 COVID-19 LAB TEST NON-CDC: HCPCS | Performed by: EMERGENCY MEDICINE

## 2020-07-17 PROCEDURE — 83615 LACTATE (LD) (LDH) ENZYME: CPT | Performed by: EMERGENCY MEDICINE

## 2020-07-17 PROCEDURE — 81001 URINALYSIS AUTO W/SCOPE: CPT | Performed by: EMERGENCY MEDICINE

## 2020-07-17 PROCEDURE — 71045 X-RAY EXAM CHEST 1 VIEW: CPT

## 2020-07-17 PROCEDURE — 99284 EMERGENCY DEPT VISIT MOD MDM: CPT

## 2020-07-17 PROCEDURE — 85379 FIBRIN DEGRADATION QUANT: CPT | Performed by: EMERGENCY MEDICINE

## 2020-07-17 PROCEDURE — 84145 PROCALCITONIN (PCT): CPT | Performed by: EMERGENCY MEDICINE

## 2020-07-17 PROCEDURE — 83690 ASSAY OF LIPASE: CPT

## 2020-07-17 PROCEDURE — 85025 COMPLETE CBC W/AUTO DIFF WBC: CPT

## 2020-07-17 RX ORDER — ONDANSETRON 8 MG/1
8 TABLET, ORALLY DISINTEGRATING ORAL EVERY 8 HOURS PRN
Qty: 20 TABLET | Refills: 0 | Status: SHIPPED | OUTPATIENT
Start: 2020-07-17 | End: 2021-08-26

## 2020-07-17 RX ORDER — SODIUM CHLORIDE 0.9 % (FLUSH) 0.9 %
10 SYRINGE (ML) INJECTION AS NEEDED
Status: DISCONTINUED | OUTPATIENT
Start: 2020-07-17 | End: 2020-07-17 | Stop reason: HOSPADM

## 2020-07-17 RX ORDER — ONDANSETRON 4 MG/1
8 TABLET, ORALLY DISINTEGRATING ORAL ONCE
Status: COMPLETED | OUTPATIENT
Start: 2020-07-17 | End: 2020-07-17

## 2020-07-17 RX ORDER — ACETAMINOPHEN 500 MG
1000 TABLET ORAL ONCE
Status: COMPLETED | OUTPATIENT
Start: 2020-07-17 | End: 2020-07-17

## 2020-07-17 RX ADMIN — ONDANSETRON 8 MG: 4 TABLET, ORALLY DISINTEGRATING ORAL at 19:02

## 2020-07-17 RX ADMIN — ACETAMINOPHEN 1000 MG: 500 TABLET, FILM COATED ORAL at 19:02

## 2020-07-17 NOTE — DISCHARGE INSTRUCTIONS
Check your pulse oximeter twice a day.  Keep a log with the pulse ox.  If your pulse ox falls regularly below 91% call your doctor at the [facility] or return to the ED    Stay well-hydrated.  Use Tylenol as needed for fever and chills.  Try to eat and maintain good nutrition    Continue your Centrum multivitamin daily.  You may consider over-the-counter zinc as well    Take Zofran for nausea as needed    Quarantine at home until you have 3 days without fever, improvement of any respiratory symptoms, and or at least 10 days from onset of your symptoms.  This would be 8 more days at a minimum    Follow-up with your primary care physician at the [facility] on Monday.  Since there is a good likelihood that you have a coronavirus this may be able to be done, and would be safer to be done by telehealth.  Call them tomorrow to arrange this follow-up.  If her symptoms are not improving or declining follow-up with the [facility], first by phone or telehealth if possible or return to the ED.    Rotate your positions while lying down rotating to each side and front and back if possible.  This may help decrease your lungs symptoms    If you have significant incline, shortness of breath, low oxygen sats, or any other acute urgent emergent or progressive symptoms, return to the ED at once.  As discussed there is a significant likelihood that you he may need to return to the ED.  Please return promptly and delay your symptoms progressed to severe symptoms if needed   ambulatory

## 2020-07-17 NOTE — ED PROVIDER NOTES
Subjective   Mr. Anderson Goode is a 64 y.o male presenting to the emergency department with complaints of a fever over the past 2 days. He complains of chills, nausea, poor appetite, body aches, and diaphoresis. His fever reached 101 degrees Fahrenheit. He confirms his friend had a loss of smell and he was around him at the beginning of this month. He denies chest pain, shortness of breath, leg pain, abdominal pain, vomiting, diarrhea, cough, congestion, sore throat, and loss of taste and smell. He is a smoker and confirms current allergies with a mild, productive cough. There are no other acute symptoms at this time.      History provided by:  Patient  Fever   Max temp prior to arrival:  101  Severity:  Moderate  Onset quality:  Sudden  Duration:  2 days  Timing:  Constant  Progression:  Worsening  Chronicity:  New  Relieved by:  None tried  Worsened by:  Nothing  Ineffective treatments:  None tried  Associated symptoms: chills, cough (mild) and nausea    Associated symptoms: no chest pain, no congestion, no diarrhea, no dysuria, no rhinorrhea, no sore throat and no vomiting    Cough:     Cough characteristics:  Productive    Severity:  Mild  Risk factors: sick contacts        Review of Systems   Constitutional: Positive for appetite change (decrease), chills, diaphoresis and fever.        Body aches   HENT: Negative for congestion, rhinorrhea and sore throat.         No loss of taste or smell   Respiratory: Positive for cough (mild). Negative for shortness of breath and wheezing.    Cardiovascular: Negative for chest pain and palpitations.   Gastrointestinal: Positive for nausea. Negative for abdominal pain, blood in stool, constipation, diarrhea and vomiting.   Genitourinary: Negative for decreased urine volume, difficulty urinating, dysuria, enuresis, frequency, hematuria and urgency.   Musculoskeletal:        No leg pain   All other systems reviewed and are negative.      Past Medical History:   Diagnosis Date    • Allergic rhinitis    • Chronic low back pain 1988    Injury from fall - History epidural injections ×7   • Colon adenomas 2016   • Diverticulosis 2016   • Elbow fracture, right 1964   • Generalized osteoarthritis    • GERD (gastroesophageal reflux disease) Adulthood    Moderate recurring stiffness and achiness   • History of recurrent UTIs    • Hyperlipidemia Adulthood    Long-term tight control on Lipitor   • Obesity 2013    Weight 216 BMI 32   • Prediabetes    • Prostatism 2010   • RBBB    • Vitamin D deficiency        Allergies   Allergen Reactions   • Cephalexin Rash   • Omeprazole GI Intolerance       Past Surgical History:   Procedure Laterality Date   • APPENDECTOMY     • CARDIOVASCULAR STRESS TEST      Nuclear stress test normal   • COLONOSCOPY W/ POLYPECTOMY      Benign adenomas   • HEMORRHOIDECTOMY  ,  x 3      with banding   • LIFT / REPAIR BROW PTOSIS FOREHEAD Bilateral    • LUMBAR EPIDURAL INJECTION  1988    Injections ×7 after back injury from fall   • TONSILLECTOMY     • WRIST FRACTURE SURGERY Right 2017    Open reduction internal fixation       Family History   Problem Relation Age of Onset   • Atrial fibrillation Mother    • Arthritis Mother    • Breast cancer Mother    • Diabetes Mother    • Heart attack Father          age 84   • COPD Father         tobacco user   • Other Father         MRSA   • Pneumonia Father    • Obesity Sister    • Esophagitis Sister    • Obesity Brother    • Drug abuse Other         Overdose   • Dementia Other    • Lumbar disc disease Other         Chronic pain syndrome       Social History     Socioeconomic History   • Marital status:      Spouse name: Not on file   • Number of children: Not on file   • Years of education: Not on file   • Highest education level: Not on file   Tobacco Use   • Smoking status: Never Smoker   • Smokeless tobacco: Never Used   Substance and Sexual Activity   • Alcohol use: Yes      Alcohol/week: 7.0 standard drinks     Types: 7 Cans of beer per week     Comment: wife smoked 2 PPD - 34 years   • Drug use: No   • Sexual activity: Defer   Social History Narrative    Domestic life   lives in private home alone -  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                               Good support from local sister.                                Anglican    Alevism        Sleep hygiene    in bed midnight to 6 AM for 6 hours of sleep        Caffeine use    1 can diet soda daily        Exercise habits   walks all day as grocery         Diet    well-balanced diet        Occupation   Works 8 hours - 5 days a week - [employer]         Hearing : No impairment        Vision : Corrects with glasses        Driving : No limitations         Objective   Physical Exam   Constitutional: He is oriented to person, place, and time. He appears well-developed and well-nourished. No distress. He is not intubated.   HENT:   Head: Normocephalic and atraumatic.   Right Ear: External ear normal.   Left Ear: External ear normal.   Nose: Nose normal.   Mouth/Throat: Uvula is midline, oropharynx is clear and moist and mucous membranes are normal. Mucous membranes are not dry. No oropharyngeal exudate, posterior oropharyngeal edema or posterior oropharyngeal erythema. No tonsillar exudate.   Eyes: Pupils are equal, round, and reactive to light. Conjunctivae and EOM are normal. No scleral icterus.   Chronically right drooping eye   Neck: Normal range of motion. Neck supple. No JVD present.   Cardiovascular: Normal rate, regular rhythm, normal heart sounds and intact distal pulses. Exam reveals no gallop and no friction rub.   No murmur heard.  Pulmonary/Chest: Effort normal and breath sounds normal. No accessory muscle usage or stridor. No tachypnea and no bradypnea. He is not intubated. No respiratory distress. He has no decreased breath sounds. He has no wheezes. He has no  rhonchi. He has no rales. He exhibits no tenderness.   Lungs are clear bilaterally   Abdominal: Soft. There is no tenderness. There is no rebound and no guarding.   Musculoskeletal: Normal range of motion. He exhibits no edema, tenderness or deformity.   Neurological: He is alert and oriented to person, place, and time. He displays normal reflexes. No cranial nerve deficit or sensory deficit. He exhibits normal muscle tone. Coordination normal.   Skin: Skin is warm, dry and intact. No bruising, no ecchymosis and no rash noted. He is not diaphoretic. No cyanosis or erythema. No pallor. Nails show no clubbing.   Psychiatric: He has a normal mood and affect. His behavior is normal.   Nursing note and vitals reviewed.      Procedures         ED Course  Recent Results (from the past 24 hour(s))   Comprehensive Metabolic Panel    Collection Time: 07/17/20  1:24 PM   Result Value Ref Range    Glucose 136 (H) 65 - 99 mg/dL    BUN 19 8 - 23 mg/dL    Creatinine 0.79 0.76 - 1.27 mg/dL    Sodium 135 (L) 136 - 145 mmol/L    Potassium 4.1 3.5 - 5.2 mmol/L    Chloride 99 98 - 107 mmol/L    CO2 23.0 22.0 - 29.0 mmol/L    Calcium 8.8 8.6 - 10.5 mg/dL    Total Protein 7.3 6.0 - 8.5 g/dL    Albumin 3.90 3.50 - 5.20 g/dL    ALT (SGPT) 49 (H) 1 - 41 U/L    AST (SGOT) 43 (H) 1 - 40 U/L    Alkaline Phosphatase 77 39 - 117 U/L    Total Bilirubin 0.6 0.0 - 1.2 mg/dL    eGFR Non African Amer 98 >60 mL/min/1.73    Globulin 3.4 gm/dL    A/G Ratio 1.1 g/dL    BUN/Creatinine Ratio 24.1 7.0 - 25.0    Anion Gap 13.0 5.0 - 15.0 mmol/L   Lipase    Collection Time: 07/17/20  1:24 PM   Result Value Ref Range    Lipase 13 13 - 60 U/L   Light Blue Top    Collection Time: 07/17/20  1:24 PM   Result Value Ref Range    Extra Tube hold for add-on    Green Top (Gel)    Collection Time: 07/17/20  1:24 PM   Result Value Ref Range    Extra Tube Hold for add-ons.    Lavender Top    Collection Time: 07/17/20  1:24 PM   Result Value Ref Range    Extra Tube hold  for add-on    Gold Top - SST    Collection Time: 07/17/20  1:24 PM   Result Value Ref Range    Extra Tube Hold for add-ons.    CBC Auto Differential    Collection Time: 07/17/20  1:24 PM   Result Value Ref Range    WBC 8.26 3.40 - 10.80 10*3/mm3    RBC 4.32 4.14 - 5.80 10*6/mm3    Hemoglobin 13.6 13.0 - 17.7 g/dL    Hematocrit 40.8 37.5 - 51.0 %    MCV 94.4 79.0 - 97.0 fL    MCH 31.5 26.6 - 33.0 pg    MCHC 33.3 31.5 - 35.7 g/dL    RDW 13.3 12.3 - 15.4 %    RDW-SD 46.0 37.0 - 54.0 fl    MPV 9.6 6.0 - 12.0 fL    Platelets 162 140 - 450 10*3/mm3    Neutrophil % 82.4 (H) 42.7 - 76.0 %    Lymphocyte % 8.0 (L) 19.6 - 45.3 %    Monocyte % 7.7 5.0 - 12.0 %    Eosinophil % 1.3 0.3 - 6.2 %    Basophil % 0.2 0.0 - 1.5 %    Immature Grans % 0.4 0.0 - 0.5 %    Neutrophils, Absolute 6.80 1.70 - 7.00 10*3/mm3    Lymphocytes, Absolute 0.66 (L) 0.70 - 3.10 10*3/mm3    Monocytes, Absolute 0.64 0.10 - 0.90 10*3/mm3    Eosinophils, Absolute 0.11 0.00 - 0.40 10*3/mm3    Basophils, Absolute 0.02 0.00 - 0.20 10*3/mm3    Immature Grans, Absolute 0.03 0.00 - 0.05 10*3/mm3    nRBC 0.0 0.0 - 0.2 /100 WBC   D-dimer, Quantitative    Collection Time: 07/17/20  1:24 PM   Result Value Ref Range    D-Dimer, Quantitative 1.49 (H) 0.00 - 0.56 MCGFEU/mL   Procalcitonin    Collection Time: 07/17/20  1:24 PM   Result Value Ref Range    Procalcitonin 0.13 0.00 - 0.25 ng/mL   Lactate Dehydrogenase    Collection Time: 07/17/20  1:24 PM   Result Value Ref Range     135 - 225 U/L   Urinalysis With Microscopic If Indicated (No Culture) - Urine, Clean Catch    Collection Time: 07/17/20  2:33 PM   Result Value Ref Range    Color, UA Dark Yellow (A) Yellow, Straw    Appearance, UA Clear Clear    pH, UA 5.5 5.0 - 8.0    Specific Gravity, UA 1.035 (H) 1.001 - 1.030    Glucose, UA Negative Negative    Ketones, UA Trace (A) Negative    Bilirubin, UA Negative Negative    Blood, UA Negative Negative    Protein, UA 30 mg/dL (1+) (A) Negative    Leuk Esterase, UA  Negative Negative    Nitrite, UA Negative Negative    Urobilinogen, UA 1.0 E.U./dL 0.2 - 1.0 E.U./dL   Urinalysis, Microscopic Only - Urine, Clean Catch    Collection Time: 07/17/20  2:33 PM   Result Value Ref Range    RBC, UA 3-6 (A) None Seen, 0-2 /HPF    WBC, UA 0-2 None Seen, 0-2 /HPF    Bacteria, UA None Seen None Seen, Trace /HPF    Squamous Epithelial Cells, UA 0-2 None Seen, 0-2 /HPF    Hyaline Casts, UA 7-12 0 - 6 /LPF    Methodology Automated Microscopy    Respiratory Panel, PCR - Swab, Nasopharynx    Collection Time: 07/17/20  4:13 PM   Result Value Ref Range    ADENOVIRUS, PCR Not Detected Not Detected    Coronavirus 229E Not Detected Not Detected    Coronavirus HKU1 Not Detected Not Detected    Coronavirus NL63 Not Detected Not Detected    Coronavirus OC43 Not Detected Not Detected    Human Metapneumovirus Not Detected Not Detected    Human Rhinovirus/Enterovirus Not Detected Not Detected    Influenza B PCR Not Detected Not Detected    Parainfluenza Virus 1 Not Detected Not Detected    Parainfluenza Virus 2 Not Detected Not Detected    Parainfluenza Virus 3 Not Detected Not Detected    Parainfluenza Virus 4 Not Detected Not Detected    Bordetella pertussis pcr Not Detected Not Detected    Influenza A H1 2009 PCR Not Detected Not Detected    Chlamydophila pneumoniae PCR Not Detected Not Detected    Mycoplasma pneumo by PCR Not Detected Not Detected    Influenza A PCR Not Detected Not Detected    Influenza A H3 Not Detected Not Detected    Influenza A H1 Not Detected Not Detected    RSV, PCR Not Detected Not Detected    Bordetella parapertussis PCR Not Detected Not Detected     Note: In addition to lab results from this visit, the labs listed above may include labs taken at another facility or during a different encounter within the last 24 hours. Please correlate lab times with ED admission and discharge times for further clarification of the services performed during this visit.    XR Chest 1 View    Preliminary Result   No acute cardiopulmonary disease.       D:  07/17/2020   E:  07/17/2020                    Vitals:    07/17/20 1530 07/17/20 1600 07/17/20 1734 07/17/20 1735   BP: 117/76 118/77 141/90    Pulse: 73 70     Resp:       Temp:       TempSrc:       SpO2: 94% 95%  98%   Weight:       Height:         Medications   sodium chloride 0.9 % flush 10 mL (has no administration in time range)   acetaminophen (TYLENOL) tablet 1,000 mg (has no administration in time range)   ondansetron ODT (ZOFRAN-ODT) disintegrating tablet 8 mg (has no administration in time range)     ECG/EMG Results (last 24 hours)     ** No results found for the last 24 hours. **        No orders to display         1539    COVID-19 RISK SCREEN    Has the patient had close contact without PPE with a lab confirmed COVID-19 (+) person or a person under investigation (PUI) for COVID-19 infection?  -- Unknown    Has the patient had respiratory symptoms, worsened/new cough and/or SOA, unexplained fever, or sudden loss of smell and/or taste in the past 7 days? --  Yes    Does the patient have baseline higher exposure risk such as working in healthcare field or currently residing in healthcare facility?  --  No    1638  Patient's radiograph is negative.  White count is normal and without a significant left shift.  ALT and AST are minimally elevated.  Urine is concentrated but without sign of infection, and minimal microscopic hematuria.  D-dimer is minimally elevated without any stigmata of DVT shortness of breath pleuritic pain or PE findings.  As this is a marker for coronavirus this is been a slightly higher risk group.  His LDH is at the upper limit of normal and his procalcitonin is negative, indicating a further increase risk of coronavirus.  He had direct contact with his friend who was asymptomatic other than loss of taste and smell recently.  A coronavirus swab has been sent.      1832  Discussed findings at length with the patient.  He  had some chills however these have now resolved.  He denies shortness of breath at any has general discomfort when he has his chills.  He is treated with Tylenol.  He does report that he is nauseated I will treat him with Zofran as well    I discussed the probability that he has coronavirus.  I discussed the test that we have done today.  In view of his high probability of having coronavirus I have asked him to be retested if his first test today comes back negative and we talked about sensitive and specificity.  I will have him call his PCP at the [facility] with Dr. Shields on Monday.  He has a coronavirus test at [facility] on Tuesday but I feel that this test will likely come back positive.    We talked about the risks especially for shortness of breath and hypoxia.  I discussed hydration and nutrition.  Discussed early return if he has worsening symptoms as he does report that he is worse today than yesterday.  His sats remain excellent at 98% on room air.  He is hemodynamically stable and meets criteria for discharge at the current time that we discussed his age of 64 putting him in a higher risk group.  I discussed quarantine and risks for other contacts and is aware of this as well    Very pleasant and reliable patient verbalizes understanding agreement plan of care, need for follow-up, and indications for immediate return.      MDM    Final diagnoses:   Exposure to 2019 novel coronavirus   Febrile illness   Viral syndrome       Documentation assistance provided by javed Mix.  Information recorded by the javed was done at my direction and has been verified and validated by me.     Michelle Mix  07/17/20 9654       Edd Nunez MD  07/17/20 7887

## 2020-07-18 LAB
REF LAB TEST METHOD: NORMAL
SARS-COV-2 RNA RESP QL NAA+PROBE: NOT DETECTED

## 2020-07-20 ENCOUNTER — TELEPHONE (OUTPATIENT)
Dept: FAMILY MEDICINE CLINIC | Facility: CLINIC | Age: 65
End: 2020-07-20

## 2020-07-20 ENCOUNTER — TELEPHONE (OUTPATIENT)
Dept: EMERGENCY DEPT | Facility: HOSPITAL | Age: 65
End: 2020-07-20

## 2020-07-20 NOTE — TELEPHONE ENCOUNTER
I had sent a message to the  pool to make him a phone appt for this week to follow up. ER doc contacted me about him.

## 2020-07-20 NOTE — TELEPHONE ENCOUNTER
Pt said he went to the ER on Saturday with a fever of 101 and chills.  Pt said he also had some nausea and vomited on Saturday.  Pt said he is feeling a little better but still has a fever and some sweating.  Pt said he was tested for multiple things while at the ER but has not received all of the results.  Pt was told to contact his PCP on Monday but was not sure why.  Pt requesting call back to discuss further.

## 2020-07-21 NOTE — TELEPHONE ENCOUNTER
Called patient earlier and LVM to schedule appt. Reached patient this afternoon and he would like to wait to schedule appointment until after he gets his covid results

## 2020-08-12 ENCOUNTER — OFFICE VISIT (OUTPATIENT)
Dept: FAMILY MEDICINE CLINIC | Facility: CLINIC | Age: 65
End: 2020-08-12

## 2020-08-12 VITALS
WEIGHT: 198.2 LBS | SYSTOLIC BLOOD PRESSURE: 114 MMHG | OXYGEN SATURATION: 97 % | BODY MASS INDEX: 26.84 KG/M2 | HEIGHT: 72 IN | HEART RATE: 88 BPM | DIASTOLIC BLOOD PRESSURE: 70 MMHG

## 2020-08-12 DIAGNOSIS — R73.9 HYPERGLYCEMIA: Primary | ICD-10-CM

## 2020-08-12 DIAGNOSIS — R50.9 FEBRILE ILLNESS: ICD-10-CM

## 2020-08-12 DIAGNOSIS — E78.00 PURE HYPERCHOLESTEROLEMIA: ICD-10-CM

## 2020-08-12 DIAGNOSIS — J30.2 SEASONAL ALLERGIC RHINITIS, UNSPECIFIED TRIGGER: ICD-10-CM

## 2020-08-12 LAB — HBA1C MFR BLD: 5.7 %

## 2020-08-12 PROCEDURE — 83036 HEMOGLOBIN GLYCOSYLATED A1C: CPT | Performed by: FAMILY MEDICINE

## 2020-08-12 PROCEDURE — 99214 OFFICE O/P EST MOD 30 MIN: CPT | Performed by: FAMILY MEDICINE

## 2020-08-12 RX ORDER — MOMETASONE FUROATE 50 UG/1
2 SPRAY, METERED NASAL DAILY
Qty: 17 EACH | Refills: 11 | Status: SHIPPED | OUTPATIENT
Start: 2020-08-12 | End: 2021-02-17

## 2020-08-12 NOTE — PROGRESS NOTES
Subjective   Anderson Goode is a 64 y.o. male.     Chief Complaint   Patient presents with   • Hyperglycemia   • Allergies     patient switched insurance and needs nasonex nasal spray in generic form sent in to pharmacy       History of Present Illness     Anderson Goode presents today for   Chief Complaint   Patient presents with   • Hyperglycemia   • Allergies     patient switched insurance and needs nasonex nasal spray in generic form sent in to pharmacy     he is in need of chronic disease followup. he denies acute complaints today.  He had a febrile illness with a fever of 101 for about 3 days in July.  Negative cover testing at the time.  He found out that his grandmother  in the 1930s from throat cancer.  His throat symptoms improved significantly with treatment of his allergies.    This patient is accompanied by their self who contributes to the history of their care.    The following portions of the patient's history were reviewed and updated as appropriate: allergies, current medications, past family history, past medical history, past social history, past surgical history and problem list.    Active Ambulatory Problems     Diagnosis Date Noted   • Gastroesophageal reflux disease 2016   • Atopic rhinitis 2016   • Generalized osteoarthritis 2016   • Hyperglycemia 2016   • Hyperlipidemia 2016   • Left anterior fascicular block 2016   • Prostatism 2016   • Chronic pain of both shoulders 2016   • Vitamin D deficiency 2016   • Preventative health care 2016   • Colon adenomas 2016   • RBBB 2016   • Diverticulosis 2016     Resolved Ambulatory Problems     Diagnosis Date Noted   • Disorder of prostate 2016   • Shoulder injury 2016   • Cerumen impaction 2017     Past Medical History:   Diagnosis Date   • Allergic rhinitis    • Chronic low back pain    • Elbow fracture, right    • GERD (gastroesophageal  reflux disease) Adulthood   • History of recurrent UTIs    • Obesity    • Prediabetes      Past Surgical History:   Procedure Laterality Date   • APPENDECTOMY  1964   • CARDIOVASCULAR STRESS TEST  2014    Nuclear stress test normal   • COLONOSCOPY W/ POLYPECTOMY  2016    Benign adenomas   • HEMORRHOIDECTOMY  ,  x 3     1990s with banding   • LIFT / REPAIR BROW PTOSIS FOREHEAD Bilateral    • LUMBAR EPIDURAL INJECTION      Injections ×7 after back injury from fall   • TONSILLECTOMY     • WRIST FRACTURE SURGERY Right 2017    Open reduction internal fixation     Family History   Problem Relation Age of Onset   • Atrial fibrillation Mother    • Arthritis Mother    • Breast cancer Mother    • Diabetes Mother    • Heart attack Father          age 84   • COPD Father         tobacco user   • Other Father         MRSA   • Pneumonia Father    • Obesity Sister    • Esophagitis Sister    • Obesity Brother    • Drug abuse Other         Overdose   • Dementia Other    • Lumbar disc disease Other         Chronic pain syndrome     Social History     Socioeconomic History   • Marital status:      Spouse name: Not on file   • Number of children: Not on file   • Years of education: Not on file   • Highest education level: Not on file   Tobacco Use   • Smoking status: Never Smoker   • Smokeless tobacco: Never Used   Substance and Sexual Activity   • Alcohol use: Yes     Alcohol/week: 7.0 standard drinks     Types: 7 Cans of beer per week     Comment: wife smoked 2 PPD - 34 years   • Drug use: No   • Sexual activity: Defer   Social History Narrative    Domestic life   lives in private home alone -  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                               Good support from local sister.                                Shinto    Pentecostal        Sleep hygiene    in bed midnight to 6 AM for 6 hours of sleep        Caffeine use    1 can diet soda daily         "Exercise habits   walks all day as grocery         Diet    well-balanced diet        Occupation   Works 8 hours - 5 days a week - [employer]         Hearing : No impairment        Vision : Corrects with glasses        Driving : No limitations       Review of Systems  Review of Systems -  General ROS: negative for - chills, fever or night sweats  Cardiovascular ROS: no chest pain or dyspnea on exertion  Gastrointestinal ROS: no abdominal pain, change in bowel habits, or black or bloody stools  Genito-Urinary ROS: no trouble voiding or gross hematuria    Objective   Blood pressure 114/70, pulse 88, height 182.9 cm (72\"), weight 89.9 kg (198 lb 3.2 oz), SpO2 97 %.  Nursing note reviewed  Physical Exam  Const: NAD, A&Ox4, Pleasant, Cooperative  Eyes: EOMI, no conjunctivitis  ENT: No nasal discharge present, neck supple  Cardiac: Regular rate and rhythm, no cyanosis  Resp: Respiratory rate within normal limits, no increased work of breathing, no audible wheezing or retractions noted  GI: No distention or ascites  Procedures  Assessment/Plan     Problem List Items Addressed This Visit        Cardiovascular and Mediastinum    Hyperlipidemia    Overview     Atorvastatin 80 mg, full-strength aspirin         Relevant Orders    Lipid Panel    Comprehensive Metabolic Panel       Respiratory    Atopic rhinitis    Overview     Singulair 10 mg replaced Zyrtec and has done well, mometasone nasal spray         Relevant Medications    mometasone (NASONEX) 50 MCG/ACT nasal spray       Other    Hyperglycemia - Primary    Overview     Diet controlled         Relevant Orders    POC Glycosylated Hemoglobin (Hb A1C) (Completed)      Other Visit Diagnoses     Febrile illness        Relevant Orders    SARS-CoV-2 Antibodies (Roche)        See patient diagnoses and orders along with patient instructions for assessment, plan, and changes to care for patient.    There are no Patient Instructions on file for this " visit.    Return in about 6 months (around 2/12/2021) for Medicare Wellness.    Ambulatory progress note signed and attested to by Luis Shields D.O.

## 2020-08-24 DIAGNOSIS — R06.02 SHORTNESS OF BREATH: ICD-10-CM

## 2020-08-24 DIAGNOSIS — J30.2 SEASONAL ALLERGIC RHINITIS, UNSPECIFIED TRIGGER: ICD-10-CM

## 2020-08-24 DIAGNOSIS — R59.0 LYMPHADENOPATHY, CERVICAL: ICD-10-CM

## 2020-08-24 RX ORDER — MONTELUKAST SODIUM 10 MG/1
TABLET ORAL
Qty: 30 TABLET | Refills: 4 | Status: SHIPPED | OUTPATIENT
Start: 2020-08-24 | End: 2021-01-11 | Stop reason: SDUPTHER

## 2020-09-01 ENCOUNTER — LAB (OUTPATIENT)
Dept: LAB | Facility: HOSPITAL | Age: 65
End: 2020-09-01

## 2020-09-01 DIAGNOSIS — R50.9 FEBRILE ILLNESS: ICD-10-CM

## 2020-09-01 DIAGNOSIS — E78.00 PURE HYPERCHOLESTEROLEMIA: ICD-10-CM

## 2020-09-01 LAB
ALBUMIN SERPL-MCNC: 4.2 G/DL (ref 3.5–5.2)
ALBUMIN/GLOB SERPL: 1.2 G/DL
ALP SERPL-CCNC: 69 U/L (ref 39–117)
ALT SERPL W P-5'-P-CCNC: 23 U/L (ref 1–41)
ANION GAP SERPL CALCULATED.3IONS-SCNC: 10.9 MMOL/L (ref 5–15)
AST SERPL-CCNC: 25 U/L (ref 1–40)
BILIRUB SERPL-MCNC: 0.7 MG/DL (ref 0–1.2)
BUN SERPL-MCNC: 14 MG/DL (ref 8–23)
BUN/CREAT SERPL: 17.9 (ref 7–25)
CALCIUM SPEC-SCNC: 9.7 MG/DL (ref 8.6–10.5)
CHLORIDE SERPL-SCNC: 103 MMOL/L (ref 98–107)
CHOLEST SERPL-MCNC: 124 MG/DL (ref 0–200)
CO2 SERPL-SCNC: 26.1 MMOL/L (ref 22–29)
CREAT SERPL-MCNC: 0.78 MG/DL (ref 0.76–1.27)
GFR SERPL CREATININE-BSD FRML MDRD: 100 ML/MIN/1.73
GLOBULIN UR ELPH-MCNC: 3.5 GM/DL
GLUCOSE SERPL-MCNC: 100 MG/DL (ref 65–99)
HDLC SERPL-MCNC: 55 MG/DL (ref 40–60)
LDLC SERPL CALC-MCNC: 61 MG/DL (ref 0–100)
LDLC/HDLC SERPL: 1.1 {RATIO}
POTASSIUM SERPL-SCNC: 4.6 MMOL/L (ref 3.5–5.2)
PROT SERPL-MCNC: 7.7 G/DL (ref 6–8.5)
SODIUM SERPL-SCNC: 140 MMOL/L (ref 136–145)
TRIGL SERPL-MCNC: 42 MG/DL (ref 0–150)
VLDLC SERPL-MCNC: 8.4 MG/DL (ref 5–40)

## 2020-09-01 PROCEDURE — 80061 LIPID PANEL: CPT

## 2020-09-01 PROCEDURE — 80053 COMPREHEN METABOLIC PANEL: CPT

## 2020-09-01 PROCEDURE — 86769 SARS-COV-2 COVID-19 ANTIBODY: CPT

## 2020-09-02 LAB — SARS-COV-2 AB SERPL QL IA: NEGATIVE

## 2020-09-08 ENCOUNTER — TELEPHONE (OUTPATIENT)
Dept: FAMILY MEDICINE CLINIC | Facility: CLINIC | Age: 65
End: 2020-09-08

## 2020-09-08 NOTE — TELEPHONE ENCOUNTER
Caller: Anderson Goode    Relationship: Self    Best call back number: [phone]    What test was performed: Blood work     When was the test performed: 09/01/2020    Where was the test performed: [facility]     Additional notes: Patient states that he is getting notifications from [email or web address] but is unable to see his results. The patient states he also had labs done on 08/12/2020. The patient states he would like a paper copy of his lab results mailed to him as well. Please advise.

## 2020-09-11 NOTE — TELEPHONE ENCOUNTER
Caller: Anderson Goode    Relationship: Self    Best call back number: [phone]    What test was performed: lab work    When was the test performed: 9/1/20    Additional notes: Patient stated he had lab work to check for COVID -19 antibodies performed on 9/1/20 and has not received the results. Patient is concerned that if his results are negative then he might have been sick due to a tick bite instead of COVID -19. Patient stated in July he had a tick on his chest which left a bump that had a red uneven border that lasted 3 weeks. Patient would like a call back as soon as possible about this.

## 2020-09-11 NOTE — TELEPHONE ENCOUNTER
States back in July 15 is when he had a 101 fever x3 day, also had a tick bite states if it wasn't COVID could it have been simon mountain spotted fever? States he can wait till Dr Shields comes back since he isn't having any symptoms.

## 2020-09-24 NOTE — TELEPHONE ENCOUNTER
Could possibly have been, but as long as his symptoms are resolved now he should not require treatment.

## 2020-10-14 RX ORDER — DOXAZOSIN MESYLATE 4 MG/1
TABLET ORAL
Qty: 90 TABLET | Refills: 0 | Status: SHIPPED | OUTPATIENT
Start: 2020-10-14 | End: 2021-01-15

## 2020-10-14 RX ORDER — ATORVASTATIN CALCIUM 80 MG/1
TABLET, FILM COATED ORAL
Qty: 90 TABLET | Refills: 0 | Status: SHIPPED | OUTPATIENT
Start: 2020-10-14 | End: 2021-01-15

## 2020-12-30 DIAGNOSIS — J30.2 SEASONAL ALLERGIC RHINITIS, UNSPECIFIED TRIGGER: ICD-10-CM

## 2020-12-31 RX ORDER — MOMETASONE FUROATE 50 UG/1
SPRAY, METERED NASAL
Qty: 17 EACH | Refills: 0 | OUTPATIENT
Start: 2020-12-31

## 2021-01-11 DIAGNOSIS — R06.02 SHORTNESS OF BREATH: ICD-10-CM

## 2021-01-11 DIAGNOSIS — R59.0 LYMPHADENOPATHY, CERVICAL: ICD-10-CM

## 2021-01-11 DIAGNOSIS — J30.2 SEASONAL ALLERGIC RHINITIS, UNSPECIFIED TRIGGER: ICD-10-CM

## 2021-01-11 NOTE — TELEPHONE ENCOUNTER
Last Office Visit: 08/12/2020  Next Office Visit: 02/17/2021    Labs completed in past 6 months? yes  Labs completed in past year? yes    Last Refill Date: 08/24/2020  Quantity: 30  Refills: 4    Pharmacy:  [facility] [phone] PH - [phone]

## 2021-01-12 RX ORDER — MONTELUKAST SODIUM 10 MG/1
10 TABLET ORAL
Qty: 90 TABLET | Refills: 3 | Status: SHIPPED | OUTPATIENT
Start: 2021-01-12 | End: 2021-08-17

## 2021-01-15 RX ORDER — ATORVASTATIN CALCIUM 80 MG/1
TABLET, FILM COATED ORAL
Qty: 90 TABLET | Refills: 0 | Status: SHIPPED | OUTPATIENT
Start: 2021-01-15 | End: 2021-04-17

## 2021-01-15 RX ORDER — DOXAZOSIN MESYLATE 4 MG/1
TABLET ORAL
Qty: 90 TABLET | Refills: 0 | Status: SHIPPED | OUTPATIENT
Start: 2021-01-15 | End: 2021-04-17

## 2021-02-17 ENCOUNTER — OFFICE VISIT (OUTPATIENT)
Dept: FAMILY MEDICINE CLINIC | Facility: CLINIC | Age: 66
End: 2021-02-17

## 2021-02-17 ENCOUNTER — LAB (OUTPATIENT)
Dept: LAB | Facility: HOSPITAL | Age: 66
End: 2021-02-17

## 2021-02-17 VITALS
TEMPERATURE: 97.8 F | BODY MASS INDEX: 26.84 KG/M2 | OXYGEN SATURATION: 98 % | HEART RATE: 78 BPM | WEIGHT: 198.2 LBS | SYSTOLIC BLOOD PRESSURE: 122 MMHG | DIASTOLIC BLOOD PRESSURE: 84 MMHG | HEIGHT: 72 IN

## 2021-02-17 DIAGNOSIS — Z12.5 SCREENING PSA (PROSTATE SPECIFIC ANTIGEN): ICD-10-CM

## 2021-02-17 DIAGNOSIS — Z00.00 MEDICARE ANNUAL WELLNESS VISIT, SUBSEQUENT: Primary | ICD-10-CM

## 2021-02-17 DIAGNOSIS — L60.3 DYSTROPHIC NAIL: ICD-10-CM

## 2021-02-17 DIAGNOSIS — Z00.00 PREVENTATIVE HEALTH CARE: ICD-10-CM

## 2021-02-17 DIAGNOSIS — J30.2 SEASONAL ALLERGIC RHINITIS, UNSPECIFIED TRIGGER: ICD-10-CM

## 2021-02-17 DIAGNOSIS — R73.9 HYPERGLYCEMIA: ICD-10-CM

## 2021-02-17 DIAGNOSIS — E55.9 VITAMIN D DEFICIENCY: ICD-10-CM

## 2021-02-17 DIAGNOSIS — R59.0 LYMPHADENOPATHY, CERVICAL: ICD-10-CM

## 2021-02-17 DIAGNOSIS — E78.00 PURE HYPERCHOLESTEROLEMIA: ICD-10-CM

## 2021-02-17 LAB
25(OH)D3 SERPL-MCNC: 47.2 NG/ML
ALBUMIN SERPL-MCNC: 4.1 G/DL (ref 3.5–5.2)
ALBUMIN UR-MCNC: <1.2 MG/DL
ALBUMIN/GLOB SERPL: 1.3 G/DL
ALP SERPL-CCNC: 63 U/L (ref 39–117)
ALT SERPL W P-5'-P-CCNC: 26 U/L (ref 1–41)
ANION GAP SERPL CALCULATED.3IONS-SCNC: 8.2 MMOL/L (ref 5–15)
AST SERPL-CCNC: 24 U/L (ref 1–40)
BASOPHILS # BLD AUTO: 0.04 10*3/MM3 (ref 0–0.2)
BASOPHILS NFR BLD AUTO: 0.6 % (ref 0–1.5)
BILIRUB SERPL-MCNC: 0.8 MG/DL (ref 0–1.2)
BILIRUB UR QL STRIP: NEGATIVE
BUN SERPL-MCNC: 14 MG/DL (ref 8–23)
BUN/CREAT SERPL: 17.7 (ref 7–25)
CALCIUM SPEC-SCNC: 9.4 MG/DL (ref 8.6–10.5)
CHLORIDE SERPL-SCNC: 101 MMOL/L (ref 98–107)
CHOLEST SERPL-MCNC: 117 MG/DL (ref 0–200)
CLARITY UR: CLEAR
CO2 SERPL-SCNC: 30.8 MMOL/L (ref 22–29)
COLOR UR: YELLOW
CREAT SERPL-MCNC: 0.79 MG/DL (ref 0.76–1.27)
CREAT UR-MCNC: 81.2 MG/DL
CRP SERPL-MCNC: 0.05 MG/DL (ref 0.01–0.5)
DEPRECATED RDW RBC AUTO: 43.4 FL (ref 37–54)
EOSINOPHIL # BLD AUTO: 0.12 10*3/MM3 (ref 0–0.4)
EOSINOPHIL NFR BLD AUTO: 1.7 % (ref 0.3–6.2)
ERYTHROCYTE [DISTWIDTH] IN BLOOD BY AUTOMATED COUNT: 12.8 % (ref 12.3–15.4)
GFR SERPL CREATININE-BSD FRML MDRD: 98 ML/MIN/1.73
GLOBULIN UR ELPH-MCNC: 3.1 GM/DL
GLUCOSE SERPL-MCNC: 83 MG/DL (ref 65–99)
GLUCOSE UR STRIP-MCNC: NEGATIVE MG/DL
HBA1C MFR BLD: 5.7 % (ref 4.8–5.6)
HCT VFR BLD AUTO: 41.5 % (ref 37.5–51)
HDLC SERPL-MCNC: 55 MG/DL (ref 40–60)
HGB BLD-MCNC: 14 G/DL (ref 13–17.7)
HGB UR QL STRIP.AUTO: NEGATIVE
IMM GRANULOCYTES # BLD AUTO: 0.02 10*3/MM3 (ref 0–0.05)
IMM GRANULOCYTES NFR BLD AUTO: 0.3 % (ref 0–0.5)
KETONES UR QL STRIP: NEGATIVE
LDLC SERPL CALC-MCNC: 50 MG/DL (ref 0–100)
LDLC/HDLC SERPL: 0.95 {RATIO}
LEUKOCYTE ESTERASE UR QL STRIP.AUTO: NEGATIVE
LYMPHOCYTES # BLD AUTO: 1.87 10*3/MM3 (ref 0.7–3.1)
LYMPHOCYTES NFR BLD AUTO: 25.8 % (ref 19.6–45.3)
MCH RBC QN AUTO: 31.5 PG (ref 26.6–33)
MCHC RBC AUTO-ENTMCNC: 33.7 G/DL (ref 31.5–35.7)
MCV RBC AUTO: 93.5 FL (ref 79–97)
MICROALBUMIN/CREAT UR: NORMAL MG/G{CREAT}
MONOCYTES # BLD AUTO: 0.66 10*3/MM3 (ref 0.1–0.9)
MONOCYTES NFR BLD AUTO: 9.1 % (ref 5–12)
NEUTROPHILS NFR BLD AUTO: 4.55 10*3/MM3 (ref 1.7–7)
NEUTROPHILS NFR BLD AUTO: 62.5 % (ref 42.7–76)
NITRITE UR QL STRIP: NEGATIVE
NRBC BLD AUTO-RTO: 0 /100 WBC (ref 0–0.2)
PH UR STRIP.AUTO: 6 [PH] (ref 5–8)
PLATELET # BLD AUTO: 213 10*3/MM3 (ref 140–450)
PMV BLD AUTO: 10.2 FL (ref 6–12)
POTASSIUM SERPL-SCNC: 4.4 MMOL/L (ref 3.5–5.2)
PROT SERPL-MCNC: 7.2 G/DL (ref 6–8.5)
PROT UR QL STRIP: NEGATIVE
PSA SERPL-MCNC: 3.45 NG/ML (ref 0–4)
RBC # BLD AUTO: 4.44 10*6/MM3 (ref 4.14–5.8)
SODIUM SERPL-SCNC: 140 MMOL/L (ref 136–145)
SP GR UR STRIP: 1.01 (ref 1–1.03)
TRIGL SERPL-MCNC: 50 MG/DL (ref 0–150)
TSH SERPL DL<=0.05 MIU/L-ACNC: 2.64 UIU/ML (ref 0.27–4.2)
UROBILINOGEN UR QL STRIP: NORMAL
VLDLC SERPL-MCNC: 12 MG/DL (ref 5–40)
WBC # BLD AUTO: 7.26 10*3/MM3 (ref 3.4–10.8)

## 2021-02-17 PROCEDURE — 82306 VITAMIN D 25 HYDROXY: CPT

## 2021-02-17 PROCEDURE — 85025 COMPLETE CBC W/AUTO DIFF WBC: CPT

## 2021-02-17 PROCEDURE — 80053 COMPREHEN METABOLIC PANEL: CPT

## 2021-02-17 PROCEDURE — 82570 ASSAY OF URINE CREATININE: CPT

## 2021-02-17 PROCEDURE — 86141 C-REACTIVE PROTEIN HS: CPT

## 2021-02-17 PROCEDURE — 80061 LIPID PANEL: CPT

## 2021-02-17 PROCEDURE — 82043 UR ALBUMIN QUANTITATIVE: CPT

## 2021-02-17 PROCEDURE — G0444 DEPRESSION SCREEN ANNUAL: HCPCS | Performed by: FAMILY MEDICINE

## 2021-02-17 PROCEDURE — 84443 ASSAY THYROID STIM HORMONE: CPT

## 2021-02-17 PROCEDURE — G0442 ANNUAL ALCOHOL SCREEN 15 MIN: HCPCS | Performed by: FAMILY MEDICINE

## 2021-02-17 PROCEDURE — 83036 HEMOGLOBIN GLYCOSYLATED A1C: CPT

## 2021-02-17 PROCEDURE — 99397 PER PM REEVAL EST PAT 65+ YR: CPT | Performed by: FAMILY MEDICINE

## 2021-02-17 PROCEDURE — G0103 PSA SCREENING: HCPCS

## 2021-02-17 PROCEDURE — G0439 PPPS, SUBSEQ VISIT: HCPCS | Performed by: FAMILY MEDICINE

## 2021-02-17 PROCEDURE — 81003 URINALYSIS AUTO W/O SCOPE: CPT

## 2021-02-17 RX ORDER — CETIRIZINE HYDROCHLORIDE 10 MG/1
10 CAPSULE, LIQUID FILLED ORAL DAILY
Qty: 90 CAPSULE | Refills: 3 | Status: SHIPPED | OUTPATIENT
Start: 2021-02-17 | End: 2021-02-17 | Stop reason: SDUPTHER

## 2021-02-17 RX ORDER — FLUTICASONE FUROATE 27.5 UG/1
2 SPRAY, METERED NASAL DAILY
Qty: 9.1 ML | Refills: 12 | Status: SHIPPED | OUTPATIENT
Start: 2021-02-17 | End: 2022-09-25

## 2021-02-17 RX ORDER — CETIRIZINE HYDROCHLORIDE 10 MG/1
10 CAPSULE, LIQUID FILLED ORAL DAILY
Qty: 90 CAPSULE | Refills: 3 | Status: SHIPPED | OUTPATIENT
Start: 2021-02-17

## 2021-02-17 NOTE — PATIENT INSTRUCTIONS
1.  Make sure to angle the spray about 45 degrees toward your ears  -May also try Sensimist if covered    2.  Referral to ENT placed    3.  Referral to podiatry to great toenail issue    4.  Rotate Zyrtec and Singulair every few months    5.  Call Dr. Hendrix's office regarding bleeding [phone]    I recommend the COVID-19 vaccine.    Below are some options available now and in coming weeks.  • The new [facility] website Vacc[place].gov helps people find out when they are eligible for a vaccine and allows them to sign up for notifications. The state vaccine hotline is [phone]. Those with hearing impairments can call [phone].  • [facility] administers vaccinations at [employer]. To request an appointment, go to [facility]/covid-[email or web address]/vaccine. If you cannot access the online tools, or need assistance filling out the form, call [phone].  • [facility] has been announced a regional vaccination site. Shots will be administered by [place] at [facility]. Appointments can be scheduled at [employer] or  [phone]. Appointments are currently for people in phase 1B, which includes those 70 and older. The site will be open 10 a.m. to 4 p.m. Tuesday through Saturday the week of Feb. 1, then from 10 a.m. to 4 p.m. Thursday through Saturday beginning the week of Feb. 8.  • [facility] continues to follow [place]'s guidance for distribution. Currently, friends and family in groups 1A and 1B can schedule vaccine appointments at [facility] locations in [facility], and [place] at [employer].    [facility] is now scheduling COVID-19 vaccinations for Phases 1A and 1B of [place]. Because initial supplies are limited, we are prioritizing administration based on guidance from the CDC and [place] state authorities. If you meet the current criteria, you may schedule an appointment to be vaccinated online, [email or web address] . Please note: All  vaccines will be provided by appointment only. Walk-ins to hospitals will not be provided a vaccine. Our office does not have the vaccine. The vaccine clinic is located at [facility] [address] [address].  Keep checking back on the website, as more vaccine becomes available new appointments will open up. There is no waiting list for the vaccine.    [place] Vaccine Phases  1A: Long-term care facilities, assisted-living facilities, healthcare personnel  1B: First responders, anyone over the age of 70, K-12 school personnel  1C: Anyone over the age of 60, anyone older than 16 with CDC highest risk COVID-19 risk conditions, all essential workers  2: Anyone over age of 40  3: Anyone over the age of 16  4: Children under the age of 16 if the vaccine is approved for this age group

## 2021-02-17 NOTE — PROGRESS NOTES
QUICK REFERENCE INFORMATION:  The ABCs of the Annual Wellness Visit    Medicare Subsequent Wellness Visit    Chief Complaint   Patient presents with   • Medicare Wellness-subsequent        HPI     Anderson Goode is a 65 y.o. male presenting for subsequent annual wellness visit.     This patient is accompanied by their self who contributes to the history of their care.    Past Medical History:   Diagnosis Date   • Allergic rhinitis    • Chronic low back pain     Injury from fall - History epidural injections ×7   • Colon adenomas 2016   • Diverticulosis 2016   • Elbow fracture, right    • Generalized osteoarthritis    • GERD (gastroesophageal reflux disease) Adulthood    Moderate recurring stiffness and achiness   • History of recurrent UTIs 2010   • Hyperlipidemia Adulthood    Long-term tight control on Lipitor   • Obesity 2013    Weight 216 BMI 32   • Prediabetes 2014   • Prostatism 2010   • RBBB    • Vitamin D deficiency       Past Surgical History:   Procedure Laterality Date   • APPENDECTOMY     • CARDIOVASCULAR STRESS TEST  2014    Nuclear stress test normal   • COLONOSCOPY W/ POLYPECTOMY      Benign adenomas   • HEMORRHOIDECTOMY  , 's x 3      with banding   • LIFT / REPAIR BROW PTOSIS FOREHEAD Bilateral    • LUMBAR EPIDURAL INJECTION  1988    Injections ×7 after back injury from fall   • TONSILLECTOMY     • WRIST FRACTURE SURGERY Right 2017    Open reduction internal fixation     Family History   Problem Relation Age of Onset   • Atrial fibrillation Mother    • Arthritis Mother    • Breast cancer Mother    • Diabetes Mother    • Heart attack Father          age 84   • COPD Father         tobacco user   • Other Father         MRSA   • Pneumonia Father    • Obesity Sister    • Esophagitis Sister    • Obesity Brother    • Drug abuse Other         Overdose   • Dementia Other    • Lumbar disc disease Other         Chronic pain syndrome      Social History      Socioeconomic History   • Marital status:      Spouse name: Not on file   • Number of children: Not on file   • Years of education: Not on file   • Highest education level: Not on file   Tobacco Use   • Smoking status: Never Smoker   • Smokeless tobacco: Never Used   Substance and Sexual Activity   • Alcohol use: Yes     Alcohol/week: 7.0 standard drinks     Types: 7 Cans of beer per week     Comment: wife smoked 2 PPD - 34 years   • Drug use: No   • Sexual activity: Defer   Social History Narrative    Domestic life   lives in private home alone -  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                               Good support from local sister.                                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     • aspirin 325 MG tablet Take 325 mg by mouth Every Night.     • Aspirin-Caffeine (CIRILO BACK & BODY PAIN EX ST) 500-32.5 MG tablet Take  by mouth Every Morning.     • atorvastatin (LIPITOR) 80 MG tablet TAKE ONE TABLET BY MOUTH EVERY NIGHT AT BEDTIME 90 tablet 0   • Cholecalciferol (VITAMIN D) 1000 UNITS tablet Take 1 capsule by mouth.     • doxazosin (CARDURA) 4 MG tablet TAKE ONE TABLET BY MOUTH EVERY EVENING 90 tablet 0   • finasteride (PROSCAR) 5 MG tablet TAKE ONE TABLET BY MOUTH EVERY NIGHT AT BEDTIME 90 tablet 0   • Glucosamine HCl 1500 MG tablet Take 1,500 mg by mouth  "daily. 30 each    • hypromellose (ARTIFICIAL TEARS) 0.4 % solution Apply  to eye.     • montelukast (SINGULAIR) 10 MG tablet Take 1 tablet by mouth every night at bedtime. 90 tablet 3   • Multiple Vitamins-Minerals (CENTRUM ADULTS PO) Take  by mouth daily.     • ondansetron ODT (ZOFRAN-ODT) 8 MG disintegrating tablet Place 1 tablet on the tongue Every 8 (Eight) Hours As Needed for Nausea or Vomiting. 20 tablet 0   • Cetirizine HCl (ZYRTEC ALLERGY) 10 MG capsule Take 1 tablet by mouth Daily As Needed.     • mometasone (NASONEX) 50 MCG/ACT nasal spray 2 sprays into the nostril(s) as directed by provider Daily. 17 each 11     No facility-administered medications prior to visit.        Reviewed use of high risk medication in the elderly: yes  Reviewed for potential of harmful drug interactions in the elderly: yes    The following portions of the patient's history were reviewed and updated as appropriate: allergies, current medications, past family history, past medical history, past social history, past surgical history and problem list.    Review of Systems   Review of Systems -  See Annual Exam ROS scanned into chart    Vitals:    02/17/21 1134   BP: 122/84   BP Location: Left arm   Patient Position: Sitting   Cuff Size: Large Adult   Pulse: 78   Temp: 97.8 °F (36.6 °C)   SpO2: 98%   Weight: 89.9 kg (198 lb 3.2 oz)   Height: 182.9 cm (72.01\")       Objective    Physical Exam   Const: NAD, A&Ox4, Pleasant, Cooperative  Eyes: EOMI, no conjunctivitis. Right eye strabismus  ENT: No nasal discharge present, neck supple. Still rather large submandibular LAD R>L  Cardiac: Regular rate and rhythm, no cyanosis  Resp: Respiratory rate within normal limits, no increased work of breathing, no audible wheezing or retractions noted  GI: No distention or ascites  MSK: Motor and sensation grossly intact in bilateral upper extremities  Neurologic: CN II-XII grossly intact  Psych: Appropriate mood and behavior.  HEALTH RISK " ASSESSMENT    1955    Recent Hospitalizations:  No hospitalization(s) within the last year..      Current Medical Providers:  Patient Care Team:  Luis Shields DO as PCP - General (Family Medicine)      Smoking Status:  Social History     Tobacco Use   Smoking Status Never Smoker   Smokeless Tobacco Never Used       Alcohol Consumption:  Social History     Substance and Sexual Activity   Alcohol Use Yes   • Alcohol/week: 7.0 standard drinks   • Types: 7 Cans of beer per week    Comment: wife smoked 2 PPD - 34 years       Depression Screen:   PHQ-2/PHQ-9 Depression Screening 2/17/2021   Little interest or pleasure in doing things 0   Feeling down, depressed, or hopeless 3   Trouble falling or staying asleep, or sleeping too much 0   Feeling tired or having little energy 0   Poor appetite or overeating 0   Feeling bad about yourself - or that you are a failure or have let yourself or your family down 0   Trouble concentrating on things, such as reading the newspaper or watching television 0   Moving or speaking so slowly that other people could have noticed. Or the opposite - being so fidgety or restless that you have been moving around a lot more than usual 0   Thoughts that you would be better off dead, or of hurting yourself in some way 0   Total Score 3       Health Habits and Functional and Cognitive Screening:  Functional & Cognitive Status 2/17/2021   Do you have difficulty preparing food and eating? No   Do you have difficulty bathing yourself, getting dressed or grooming yourself? No   Do you have difficulty using the toilet? No   Do you have difficulty moving around from place to place? No   Do you have trouble with steps or getting out of a bed or a chair? No   Current Diet Unhealthy Diet   Dental Exam Up to date   Eye Exam Not up to date   Exercise (times per week) 0 times per week   Current Exercise Activities Include None   Do you need help using the phone?  No   Are you deaf or do you have  serious difficulty hearing?  No   Do you need help with transportation? Yes   Do you need help shopping? No   Do you need help preparing meals?  No   Do you need help with housework?  No   Do you need help with laundry? No   Do you need help taking your medications? No   Do you need help managing money? No   Do you ever drive or ride in a car without wearing a seat belt? No   Have you felt unusual stress, anger or loneliness in the last month? No   Who do you live with? Alone   If you need help, do you have trouble finding someone available to you? No   Have you been bothered in the last four weeks by sexual problems? No   Do you have difficulty concentrating, remembering or making decisions? No         Does the patient have evidence of cognitive impairment? No        Aspirin use counseling? Taking ASA appropriately as indicated      Recent Lab Results:  CMP:  Lab Results   Component Value Date    BUN 14 09/01/2020    CREATININE 0.78 09/01/2020    EGFRIFNONA 100 09/01/2020    BCR 17.9 09/01/2020     09/01/2020    K 4.6 09/01/2020    CO2 26.1 09/01/2020    CALCIUM 9.7 09/01/2020    ALBUMIN 4.20 09/01/2020    BILITOT 0.7 09/01/2020    ALKPHOS 69 09/01/2020    AST 25 09/01/2020    ALT 23 09/01/2020     Lipid Panel:  Lab Results   Component Value Date    CHOL 124 09/01/2020    TRIG 42 09/01/2020    HDL 55 09/01/2020    VLDL 8.4 09/01/2020    LDLHDL 1.10 09/01/2020     HbA1c:  Lab Results   Component Value Date    HGBA1C 5.7 08/12/2020       Visual Acuity:  No exam data present    Age-appropriate Screening Schedule:  Refer to the list below for future screening recommendations based on patient's age, sex and/or medical conditions. Orders for these recommended tests are listed in the plan section. The patient has been provided with a written plan.    Health Maintenance   Topic Date Due   • ZOSTER VACCINE (2 of 3) 08/26/2015   • TDAP/TD VACCINES (2 - Td) 05/11/2020   • LIPID PANEL  09/01/2021   • COLONOSCOPY   06/13/2029   • INFLUENZA VACCINE  Discontinued          Advance Care Planning:  ACP discussion was held with the patient during this visit. Patient has an advance directive (not in EMR), copy requested.    Identification of Risk Factors:  Risk factors include: Cardiovascular risk  Colon Cancer Screening  Diabetic Lab Screening   Immunizations Discussed/Encouraged (specific immunizations; Pneumococcal 23 and due for zoster and pneumovax, defer until after COVID vaccine ).    Compared to one year ago, the patient feels his physical health is the same.  Compared to one year ago, the patient feels his mental health is the same.      Diagnoses and all orders for this visit:    1. Medicare annual wellness visit, subsequent (Primary)    2. Preventative health care  -     Microalbumin / Creatinine Urine Ratio - Urine, Clean Catch; Future  -     Comprehensive Metabolic Panel; Future  -     CBC & Differential; Future  -     High Sensitivity CRP; Future  -     Hemoglobin A1c; Future  -     Lipid Panel; Future  -     TSH Rfx On Abnormal To Free T4; Future  -     Urinalysis With Microscopic If Indicated (No Culture) - Urine, Clean Catch; Future  -     Vitamin D 25 Hydroxy; Future    3. Hyperglycemia  -     Microalbumin / Creatinine Urine Ratio - Urine, Clean Catch; Future  -     Comprehensive Metabolic Panel; Future  -     CBC & Differential; Future  -     High Sensitivity CRP; Future  -     Hemoglobin A1c; Future  -     Lipid Panel; Future  -     TSH Rfx On Abnormal To Free T4; Future  -     Urinalysis With Microscopic If Indicated (No Culture) - Urine, Clean Catch; Future  -     Vitamin D 25 Hydroxy; Future    4. Pure hypercholesterolemia  -     Microalbumin / Creatinine Urine Ratio - Urine, Clean Catch; Future  -     Comprehensive Metabolic Panel; Future  -     CBC & Differential; Future  -     High Sensitivity CRP; Future  -     Hemoglobin A1c; Future  -     Lipid Panel; Future  -     TSH Rfx On Abnormal To Free T4;  Future  -     Urinalysis With Microscopic If Indicated (No Culture) - Urine, Clean Catch; Future  -     Vitamin D 25 Hydroxy; Future    5. Vitamin D deficiency  -     Microalbumin / Creatinine Urine Ratio - Urine, Clean Catch; Future  -     Comprehensive Metabolic Panel; Future  -     CBC & Differential; Future  -     High Sensitivity CRP; Future  -     Hemoglobin A1c; Future  -     Lipid Panel; Future  -     TSH Rfx On Abnormal To Free T4; Future  -     Urinalysis With Microscopic If Indicated (No Culture) - Urine, Clean Catch; Future  -     Vitamin D 25 Hydroxy; Future    6. Lymphadenopathy, cervical  -     Ambulatory Referral to ENT (Otolaryngology)  -     Microalbumin / Creatinine Urine Ratio - Urine, Clean Catch; Future  -     Comprehensive Metabolic Panel; Future  -     CBC & Differential; Future  -     High Sensitivity CRP; Future  -     Hemoglobin A1c; Future  -     Lipid Panel; Future  -     TSH Rfx On Abnormal To Free T4; Future  -     Urinalysis With Microscopic If Indicated (No Culture) - Urine, Clean Catch; Future  -     Vitamin D 25 Hydroxy; Future    7. Seasonal allergic rhinitis, unspecified trigger  -     Discontinue: Cetirizine HCl (ZyrTEC Allergy) 10 MG capsule; Take 10 mg by mouth Daily.  Dispense: 90 capsule; Refill: 3  -     fluticasone (Flonase Sensimist) 27.5 MCG/SPRAY nasal spray; 2 sprays into the nostril(s) as directed by provider Daily.  Dispense: 9.1 mL; Refill: 12  -     Cetirizine HCl (ZyrTEC Allergy) 10 MG capsule; Take 10 mg by mouth Daily.  Dispense: 90 capsule; Refill: 3    8. Dystrophic nail  -     Ambulatory Referral to Podiatry    9. Screening PSA (prostate specific antigen)  -     PSA Screen; Future        Procedure   Procedures       Patient Self-Management and Personalized Health Advice  The patient has been provided with information about: diet, exercise and supplements and preventive services including:   · Alcohol Misuse Screening and Counseling  (15 minutes counseling  time, Code )  · Annual Wellness Visit (AWV)  · Depression Screening (15 minutes face to face, Code )  · Diabetes Screening-Lab Order for either glucose quantitative blood (except reagent strip), glucose;post glucose dose(includes glucose), or glucose tolerance test-3 specimens(includes glucose).    Diagnoses and all orders for this visit:    1. Medicare annual wellness visit, subsequent (Primary)    2. Preventative health care  -     Microalbumin / Creatinine Urine Ratio - Urine, Clean Catch; Future  -     Comprehensive Metabolic Panel; Future  -     CBC & Differential; Future  -     High Sensitivity CRP; Future  -     Hemoglobin A1c; Future  -     Lipid Panel; Future  -     TSH Rfx On Abnormal To Free T4; Future  -     Urinalysis With Microscopic If Indicated (No Culture) - Urine, Clean Catch; Future  -     Vitamin D 25 Hydroxy; Future    3. Hyperglycemia  -     Microalbumin / Creatinine Urine Ratio - Urine, Clean Catch; Future  -     Comprehensive Metabolic Panel; Future  -     CBC & Differential; Future  -     High Sensitivity CRP; Future  -     Hemoglobin A1c; Future  -     Lipid Panel; Future  -     TSH Rfx On Abnormal To Free T4; Future  -     Urinalysis With Microscopic If Indicated (No Culture) - Urine, Clean Catch; Future  -     Vitamin D 25 Hydroxy; Future    4. Pure hypercholesterolemia  -     Microalbumin / Creatinine Urine Ratio - Urine, Clean Catch; Future  -     Comprehensive Metabolic Panel; Future  -     CBC & Differential; Future  -     High Sensitivity CRP; Future  -     Hemoglobin A1c; Future  -     Lipid Panel; Future  -     TSH Rfx On Abnormal To Free T4; Future  -     Urinalysis With Microscopic If Indicated (No Culture) - Urine, Clean Catch; Future  -     Vitamin D 25 Hydroxy; Future    5. Vitamin D deficiency  -     Microalbumin / Creatinine Urine Ratio - Urine, Clean Catch; Future  -     Comprehensive Metabolic Panel; Future  -     CBC & Differential; Future  -     High  Sensitivity CRP; Future  -     Hemoglobin A1c; Future  -     Lipid Panel; Future  -     TSH Rfx On Abnormal To Free T4; Future  -     Urinalysis With Microscopic If Indicated (No Culture) - Urine, Clean Catch; Future  -     Vitamin D 25 Hydroxy; Future    6. Lymphadenopathy, cervical  -     Ambulatory Referral to ENT (Otolaryngology)  -     Microalbumin / Creatinine Urine Ratio - Urine, Clean Catch; Future  -     Comprehensive Metabolic Panel; Future  -     CBC & Differential; Future  -     High Sensitivity CRP; Future  -     Hemoglobin A1c; Future  -     Lipid Panel; Future  -     TSH Rfx On Abnormal To Free T4; Future  -     Urinalysis With Microscopic If Indicated (No Culture) - Urine, Clean Catch; Future  -     Vitamin D 25 Hydroxy; Future    7. Seasonal allergic rhinitis, unspecified trigger  -     Discontinue: Cetirizine HCl (ZyrTEC Allergy) 10 MG capsule; Take 10 mg by mouth Daily.  Dispense: 90 capsule; Refill: 3  -     fluticasone (Flonase Sensimist) 27.5 MCG/SPRAY nasal spray; 2 sprays into the nostril(s) as directed by provider Daily.  Dispense: 9.1 mL; Refill: 12  -     Cetirizine HCl (ZyrTEC Allergy) 10 MG capsule; Take 10 mg by mouth Daily.  Dispense: 90 capsule; Refill: 3    8. Dystrophic nail  -     Ambulatory Referral to Podiatry    9. Screening PSA (prostate specific antigen)  -     PSA Screen; Future        Problem List Items Addressed This Visit        Allergies and Adverse Reactions    Atopic rhinitis    Overview     Singulair 10 mg replaced Zyrtec and has done well, mometasone nasal spray         Relevant Medications    fluticasone (Flonase Sensimist) 27.5 MCG/SPRAY nasal spray    Cetirizine HCl (ZyrTEC Allergy) 10 MG capsule       Cardiac and Vasculature    Hyperlipidemia    Overview     Atorvastatin 80 mg, full-strength aspirin         Relevant Orders    Microalbumin / Creatinine Urine Ratio - Urine, Clean Catch    Comprehensive Metabolic Panel    CBC & Differential    High Sensitivity CRP     Hemoglobin A1c    Lipid Panel    TSH Rfx On Abnormal To Free T4    Urinalysis With Microscopic If Indicated (No Culture) - Urine, Clean Catch    Vitamin D 25 Hydroxy       Endocrine and Metabolic    Hyperglycemia    Overview     Diet controlled         Relevant Orders    Microalbumin / Creatinine Urine Ratio - Urine, Clean Catch    Comprehensive Metabolic Panel    CBC & Differential    High Sensitivity CRP    Hemoglobin A1c    Lipid Panel    TSH Rfx On Abnormal To Free T4    Urinalysis With Microscopic If Indicated (No Culture) - Urine, Clean Catch    Vitamin D 25 Hydroxy    Vitamin D deficiency    Relevant Orders    Microalbumin / Creatinine Urine Ratio - Urine, Clean Catch    Comprehensive Metabolic Panel    CBC & Differential    High Sensitivity CRP    Hemoglobin A1c    Lipid Panel    TSH Rfx On Abnormal To Free T4    Urinalysis With Microscopic If Indicated (No Culture) - Urine, Clean Catch    Vitamin D 25 Hydroxy       Health Encounters    Preventative health care    Relevant Orders    Microalbumin / Creatinine Urine Ratio - Urine, Clean Catch    Comprehensive Metabolic Panel    CBC & Differential    High Sensitivity CRP    Hemoglobin A1c    Lipid Panel    TSH Rfx On Abnormal To Free T4    Urinalysis With Microscopic If Indicated (No Culture) - Urine, Clean Catch    Vitamin D 25 Hydroxy      Other Visit Diagnoses     Medicare annual wellness visit, subsequent    -  Primary    Lymphadenopathy, cervical        Relevant Orders    Ambulatory Referral to ENT (Otolaryngology)    Microalbumin / Creatinine Urine Ratio - Urine, Clean Catch    Comprehensive Metabolic Panel    CBC & Differential    High Sensitivity CRP    Hemoglobin A1c    Lipid Panel    TSH Rfx On Abnormal To Free T4    Urinalysis With Microscopic If Indicated (No Culture) - Urine, Clean Catch    Vitamin D 25 Hydroxy    Dystrophic nail        Relevant Orders    Ambulatory Referral to Podiatry    Screening PSA (prostate specific antigen)        Relevant  Orders    PSA Screen          Patient Instructions   1.  Make sure to angle the spray about 45 degrees toward your ears  -May also try Sensimist if covered    2.  Referral to ENT placed    3.  Referral to podiatry to great toenail issue    4.  Rotate Zyrtec and Singulair every few months    5.  Call Dr. Hendrix's office regarding bleeding [phone]    I recommend the COVID-19 vaccine.    Below are some options available now and in coming weeks.  • The new [facility] website Vaccine.[place].gov helps people find out when they are eligible for a vaccine and allows them to sign up for notifications. The [facility] vaccine hotline is 701.067.3680. Those with hearing impairments can call [phone].  • [facility] administers vaccinations at [facility]. To request an appointment, go to [facility]/covid-19/vaccine. If you cannot access the online tools, or need assistance filling out the form, call [phone].  • The [place] [facility] has been announced a regional vaccination site. Shots will be administered by [facility]. Appointments can be scheduled at [employer]/CovidVaccine or  [phone]. Appointments are currently for people in phase 1B, which includes those 70 and older. The site will be open 10 a.m. to 4 p.m. Tuesday through Saturday the week of Feb. 1, then from 10 a.m. to 4 p.m. Thursday through Saturday beginning the week of Feb. 8.  • [facility] continues to follow [place]'s guidance for distribution. Currently, friends and family in groups 1A and 1B can schedule vaccine appointments at [facility] locations in [facility], and [place] at [employer].    [facility] is now scheduling COVID-19 vaccinations for Phases 1A and 1B of [place]. Because initial supplies are limited, we are prioritizing administration based on guidance from the CDC and [place] state authorities. If you meet the current criteria, you may schedule an appointment to be vaccinated  online, [email or web address] . Please note: All vaccines will be provided by appointment only. Walk-ins to hospitals will not be provided a vaccine. Our office does not have the vaccine. The vaccine clinic is located at [facility] [address] [facility] [address] [facility] [address].  Keep checking back on the website, as more vaccine becomes available new appointments will open up. There is no waiting list for the vaccine.    [place] Vaccine Phases  1A: Long-term care facilities, assisted-living facilities, healthcare personnel  1B: First responders, anyone over the age of 70, K-12 school personnel  1C: Anyone over the age of 60, anyone older than 16 with CDC highest risk COVID-19 risk conditions, all essential workers  2: Anyone over age of 40  3: Anyone over the age of 16  4: Children under the age of 16 if the vaccine is approved for this age group          The wellness exam has been reviewed in detail.  The patient has been fully counseled on preventative guidelines for vaccines, cancer screenings, and other health maintenance needs.  Functional testing has been performed to assess capacity for independent living and need for other medical interventions.  Screening for depression was completed via a validated screening model as indicated above, 15 minutes was spent in total on depression screening.  The patient was counseled on maintaining a lifestyle to promote good health and to minimize chronic diseases, including 15 minutes spent screening for alcohol misuse and counseling on safe and appropriate alcohol intake and overall risk reduction as indicated above.  The patient has been assisted with scheduling healthcare procedures for the coming year and given a written document outlining these recommendations.    Follow Up:  Return in about 6 months (around 8/17/2021).     An After Visit Summary and PPPS with all of these plans were given to the patient.

## 2021-03-01 ENCOUNTER — TELEPHONE (OUTPATIENT)
Dept: FAMILY MEDICINE CLINIC | Facility: CLINIC | Age: 66
End: 2021-03-01

## 2021-03-01 NOTE — TELEPHONE ENCOUNTER
PATIENT CALLED STATING THAT DR. RIDDLE REFERRED HIM TO A PODIATRIST AND HE WAS ORIGINALLY TOLD THE ONLY ONES THAT TAKE HIS INSURANCE ARE IN DIFFERENT CITIES.    HOWEVER PATIENT CALLED HIS INSURANCE AND THEY GAVE HIM THE INFORMATION TO A LOCAL PODIATRIST.    PATIENT WOULD LIKE TO BE REFERRED TO THE PRACTICE LISTED BELOW.    [facility]  [address] [place] [address] #: [phone]    PATIENT STATED HE WAS ALSO REFERRED TO AN ENT BUT HE HAS NOT RECEIVED A PHONE CALL REGARDING THAT APPOINTMENT.    PLEASE ADVISE AND CALL PATIENT [phone]

## 2021-03-10 ENCOUNTER — TELEPHONE (OUTPATIENT)
Dept: FAMILY MEDICINE CLINIC | Facility: CLINIC | Age: 66
End: 2021-03-10

## 2021-03-10 DIAGNOSIS — J30.2 SEASONAL ALLERGIC RHINITIS, UNSPECIFIED TRIGGER: ICD-10-CM

## 2021-03-10 DIAGNOSIS — R59.0 LYMPHADENOPATHY, CERVICAL: Primary | ICD-10-CM

## 2021-04-17 RX ORDER — ATORVASTATIN CALCIUM 80 MG/1
TABLET, FILM COATED ORAL
Qty: 90 TABLET | Refills: 0 | Status: SHIPPED | OUTPATIENT
Start: 2021-04-17 | End: 2021-07-21

## 2021-04-17 RX ORDER — DOXAZOSIN MESYLATE 4 MG/1
TABLET ORAL
Qty: 90 TABLET | Refills: 0 | Status: SHIPPED | OUTPATIENT
Start: 2021-04-17 | End: 2021-07-21

## 2021-06-15 ENCOUNTER — TELEPHONE (OUTPATIENT)
Dept: FAMILY MEDICINE CLINIC | Facility: CLINIC | Age: 66
End: 2021-06-15

## 2021-06-15 NOTE — TELEPHONE ENCOUNTER
Caller: Anderson Goode    Relationship: Self    Best call back number: [phone]    What medication are you requesting: DOXYCYCLINE 100 MG CAPSULE     What are your current symptoms: TICK BITES     How long have you been experiencing symptoms: 06/05/2021    Have you had these symptoms before:    [] Yes  [x] No    Have you been treated for these symptoms before:   [] Yes  [x] No    If a prescription is needed, what is your preferred pharmacy and phone number: [address] [phone] [facility] [phone]      Additional notes:PATIENT STATES THAT HE WAS SEEN BY DOCTOR REGINA AT THE [facility] ON 06/05/2021 FOR MULTIPLE TICK BITES THE PATIENT WAS GIVEN DOXYCYCLINE AT THE APPOINTMENT HE FINISHED THE MEDICATION TODAY BUT HAS FOUND ADDITIONAL TICK BITS SINCE HE WAS SEEN THE PATIENT WOULD LIKE ANOTHER PRESCRIPTION OF THE MEDICATION.

## 2021-06-16 NOTE — TELEPHONE ENCOUNTER
Spoke with pt, he stated that they are all new tick bites. Per Dr Shields, he does not need evaluation at the time. If he developed SX please call back to schedule appt. Dr Shields sent antibiotics to pharmacy.

## 2021-06-17 ENCOUNTER — TELEPHONE (OUTPATIENT)
Dept: FAMILY MEDICINE CLINIC | Facility: CLINIC | Age: 66
End: 2021-06-17

## 2021-06-21 RX ORDER — DOXYCYCLINE 100 MG/1
100 TABLET ORAL 2 TIMES DAILY
Qty: 20 TABLET | Refills: 0 | Status: SHIPPED | OUTPATIENT
Start: 2021-06-21 | End: 2021-08-17

## 2021-07-21 RX ORDER — ATORVASTATIN CALCIUM 80 MG/1
TABLET, FILM COATED ORAL
Qty: 90 TABLET | Refills: 0 | Status: SHIPPED | OUTPATIENT
Start: 2021-07-21 | End: 2021-10-25

## 2021-07-21 RX ORDER — DOXAZOSIN MESYLATE 4 MG/1
TABLET ORAL
Qty: 90 TABLET | Refills: 0 | Status: SHIPPED | OUTPATIENT
Start: 2021-07-21 | End: 2021-10-25

## 2021-08-17 ENCOUNTER — OFFICE VISIT (OUTPATIENT)
Dept: FAMILY MEDICINE CLINIC | Facility: CLINIC | Age: 66
End: 2021-08-17

## 2021-08-17 VITALS
OXYGEN SATURATION: 96 % | DIASTOLIC BLOOD PRESSURE: 74 MMHG | BODY MASS INDEX: 25.38 KG/M2 | HEIGHT: 72 IN | RESPIRATION RATE: 16 BRPM | WEIGHT: 187.4 LBS | SYSTOLIC BLOOD PRESSURE: 110 MMHG | HEART RATE: 78 BPM

## 2021-08-17 DIAGNOSIS — R73.9 HYPERGLYCEMIA: Primary | ICD-10-CM

## 2021-08-17 DIAGNOSIS — J30.2 SEASONAL ALLERGIC RHINITIS, UNSPECIFIED TRIGGER: ICD-10-CM

## 2021-08-17 DIAGNOSIS — N40.0 PROSTATISM: ICD-10-CM

## 2021-08-17 LAB — HBA1C MFR BLD: 5.5 %

## 2021-08-17 PROCEDURE — 99214 OFFICE O/P EST MOD 30 MIN: CPT | Performed by: FAMILY MEDICINE

## 2021-08-26 ENCOUNTER — OFFICE VISIT (OUTPATIENT)
Dept: UROLOGY | Facility: CLINIC | Age: 66
End: 2021-08-26

## 2021-08-26 VITALS
HEART RATE: 74 BPM | WEIGHT: 184.2 LBS | TEMPERATURE: 98.2 F | DIASTOLIC BLOOD PRESSURE: 78 MMHG | SYSTOLIC BLOOD PRESSURE: 134 MMHG | OXYGEN SATURATION: 97 % | BODY MASS INDEX: 24.95 KG/M2 | HEIGHT: 72 IN

## 2021-08-26 DIAGNOSIS — N13.8 BPH WITH OBSTRUCTION/LOWER URINARY TRACT SYMPTOMS: ICD-10-CM

## 2021-08-26 DIAGNOSIS — N40.1 BPH WITH OBSTRUCTION/LOWER URINARY TRACT SYMPTOMS: ICD-10-CM

## 2021-08-26 DIAGNOSIS — R39.15 URINARY URGENCY: Primary | ICD-10-CM

## 2021-08-26 LAB
BILIRUB BLD-MCNC: NEGATIVE MG/DL
CLARITY, POC: CLEAR
COLOR UR: YELLOW
GLUCOSE UR STRIP-MCNC: NEGATIVE MG/DL
KETONES UR QL: NEGATIVE
LEUKOCYTE EST, POC: NEGATIVE
NITRITE UR-MCNC: NEGATIVE MG/ML
PH UR: 6 [PH] (ref 5–8)
PROT UR STRIP-MCNC: NEGATIVE MG/DL
RBC # UR STRIP: NEGATIVE /UL
SP GR UR: 1.01 (ref 1–1.03)
UROBILINOGEN UR QL: NORMAL

## 2021-08-26 PROCEDURE — 99204 OFFICE O/P NEW MOD 45 MIN: CPT | Performed by: STUDENT IN AN ORGANIZED HEALTH CARE EDUCATION/TRAINING PROGRAM

## 2021-08-26 PROCEDURE — 51798 US URINE CAPACITY MEASURE: CPT | Performed by: STUDENT IN AN ORGANIZED HEALTH CARE EDUCATION/TRAINING PROGRAM

## 2021-08-26 PROCEDURE — 81003 URINALYSIS AUTO W/O SCOPE: CPT | Performed by: STUDENT IN AN ORGANIZED HEALTH CARE EDUCATION/TRAINING PROGRAM

## 2021-08-26 RX ORDER — SOLIFENACIN SUCCINATE 10 MG/1
10 TABLET, FILM COATED ORAL DAILY
Qty: 90 TABLET | Refills: 3 | Status: SHIPPED | OUTPATIENT
Start: 2021-08-26 | End: 2022-06-27

## 2021-08-26 NOTE — PROGRESS NOTES
[facility] Male Office Visit      Patient Name: Anderson Goode  : 1955   MRN: [id number]     Chief Complaint:  Lower Urinary Tract Symptoms.   Chief Complaint   Patient presents with   • New Patient   • Prostatism        Referring Provider: Luis Shields DO    History of Present Illness: Mr. Goode is a 65 y.o. male with history of allergic rhinitis, chronic low back pain, diverticulosis and colon polyps, gastroesophageal reflux disease, hyperlipidemia, who presents for the evaluation of lower urinary tract symptoms.  Patient reports he has never seen a prior urologist, he has been on doxazosin for urinary symptoms for a number of years.  He reports he has a moderately strong stream with no issues completing urination, he denies sensation of incomplete emptying.  His primary bothersome symptom is urinary urgency, and the sensation that he must run to the bathroom otherwise he may not be able to hold his bladder in time.  He denies significant incontinence because he is always able to find a bathroom in time.    He has not previously trialed an anticholinergic.  I reviewed the patient's old records in our system, he previously saw [facility] urologist on 2017, his postvoid residual was 0 mL at that time.  He admitted to drinking a few beers and multiple caffeinated sodas during the day and it was recommended he stop these and reassess his symptoms.  He has not since followed up with [facility] urology.    At some point the patient was taking finasteride but chose to discontinue this as he felt like his symptoms were better controlled off of this medication.    He continues to drink 4-5 Pepsi's and 1 Mountain Dew during the day and he does not drink any water.  He drinks 1 beer at night and he does not limit his fluid intake per prior to bedtime    CT A/P 2018 ordered for undifferentiated abdominal pain, personally reviewed - Prostate volume personally calculated 37  (ellipsoid)-47(bullet) cc's.     Patient denies family history of prostate cancer or urologic malignancies.  He is a never smoker.  He denies gross hematuria.    PSA history is below, his PSA has been slowly rising over the last few years, but would still be considered in the normal range for his age group. This can be monitored yearly.     Lab Results   Component Value Date    PSA 3.450 02/17/2021    PSA 2.230 02/12/2020    PSA 0.970 04/28/2017     I reviewed the patient's BMP results from February 2021, his creatinine is 0.79 with a corresponding GFR of 98.  His hemoglobin A1c is 5.5 and his TSH was 2.64.  I personally reviewed the note from August 17, 2021 at his primary care visit with Dr. Luis Shields.      Subjective      Review of System: Review of Systems   Constitutional: Negative for chills, fatigue, fever and unexpected weight change.   HENT: Positive for sore throat.    Eyes: Positive for visual disturbance.   Respiratory: Negative for cough, chest tightness and shortness of breath.    Cardiovascular: Negative for chest pain and leg swelling.   Gastrointestinal: Negative for blood in stool, constipation, diarrhea, nausea, rectal pain and vomiting.   Genitourinary: Positive for frequency and urgency. Negative for decreased urine volume, difficulty urinating, dysuria, enuresis, flank pain, genital sores and hematuria.   Musculoskeletal: Negative for joint swelling.   Skin: Negative for rash and wound.   Neurological: Negative for seizures, speech difficulty, weakness and headaches.   Psychiatric/Behavioral: Negative for confusion, sleep disturbance and suicidal ideas. The patient is not nervous/anxious.       I have reviewed the ROS documented by my clinical staff, I have updated appropriately and I agree. Danilo Doe MD    Past Medical History:  Past Medical History:   Diagnosis Date   • Allergic rhinitis    • Chronic low back pain 1988    Injury from fall - History epidural injections ×7   •  Colon adenomas 2016   • Diverticulosis 2016   • Elbow fracture, right 1964   • Generalized osteoarthritis 2005   • GERD (gastroesophageal reflux disease) Adulthood    Moderate recurring stiffness and achiness   • History of recurrent UTIs 2010   • Hyperlipidemia Adulthood    Long-term tight control on Lipitor   • Obesity 2013    Weight 216 BMI 32   • Prediabetes 2014   • Prostatism 2010   • RBBB    • Vitamin D deficiency        Past Surgical History:  Past Surgical History:   Procedure Laterality Date   • APPENDECTOMY  1964   • CARDIOVASCULAR STRESS TEST  2014    Nuclear stress test normal   • COLONOSCOPY W/ POLYPECTOMY  2016    Benign adenomas   • HEMORRHOIDECTOMY  1974, 1990's x 3     1990s with banding   • LIFT / REPAIR BROW PTOSIS FOREHEAD Bilateral 2010   • LUMBAR EPIDURAL INJECTION  1988    Injections ×7 after back injury from fall   • TONSILLECTOMY  1961   • WRIST FRACTURE SURGERY Right 07/2017    Open reduction internal fixation       Medications:    Current Outpatient Medications:   •  Ascorbic Acid (VITAMIN C) 500 MG capsule, Take  by mouth daily., Disp: , Rfl:   •  aspirin 325 MG tablet, Take 325 mg by mouth Every Night., Disp: , Rfl:   •  Aspirin-Caffeine (CIRILO BACK & BODY PAIN EX ST) 500-32.5 MG tablet, Take  by mouth Every Morning., Disp: , Rfl:   •  atorvastatin (LIPITOR) 80 MG tablet, TAKE ONE TABLET BY MOUTH EVERY NIGHT AT BEDTIME, Disp: 90 tablet, Rfl: 0  •  Cetirizine HCl (ZyrTEC Allergy) 10 MG capsule, Take 10 mg by mouth Daily., Disp: 90 capsule, Rfl: 3  •  Cholecalciferol (VITAMIN D) 1000 UNITS tablet, Take 1 capsule by mouth., Disp: , Rfl:   •  doxazosin (CARDURA) 4 MG tablet, TAKE ONE TABLET BY MOUTH EVERY EVENING, Disp: 90 tablet, Rfl: 0  •  fluticasone (Flonase Sensimist) 27.5 MCG/SPRAY nasal spray, 2 sprays into the nostril(s) as directed by provider Daily., Disp: 9.1 mL, Rfl: 12  •  Glucosamine HCl 1500 MG tablet, Take 1,500 mg by mouth daily., Disp: 30 each, Rfl:   •  hypromellose  (ARTIFICIAL TEARS) 0.4 % solution, Apply  to eye., Disp: , Rfl:   •  Multiple Vitamins-Minerals (CENTRUM ADULTS PO), Take  by mouth daily., Disp: , Rfl:   •  ondansetron ODT (ZOFRAN-ODT) 8 MG disintegrating tablet, Place 1 tablet on the tongue Every 8 (Eight) Hours As Needed for Nausea or Vomiting., Disp: 20 tablet, Rfl: 0  •  solifenacin (VESICARE) 10 MG tablet, Take 1 tablet by mouth Daily., Disp: 90 tablet, Rfl: 3    Allergies:  Allergies   Allergen Reactions   • Cephalexin Rash   • Omeprazole GI Intolerance       Social History:  Social History     Socioeconomic History   • Marital status:      Spouse name: Not on file   • Number of children: Not on file   • Years of education: Not on file   • Highest education level: Not on file   Tobacco Use   • Smoking status: Never Smoker   • Smokeless tobacco: Never Used   Vaping Use   • Vaping Use: Never used   Substance and Sexual Activity   • Alcohol use: Yes     Alcohol/week: 7.0 standard drinks     Types: 7 Cans of beer per week     Comment: wife smoked 2 PPD - 34 years   • Drug use: Yes     Types: Marijuana   • Sexual activity: Not Currently       Family History:  Family History   Problem Relation Age of Onset   • Atrial fibrillation Mother    • Arthritis Mother    • Breast cancer Mother    • Heart attack Father          age 84   • Other Father         MRSA   • Pneumonia Father    • Obesity Sister    • Esophagitis Sister    • Obesity Brother    • Drug abuse Other         Overdose   • Dementia Other    • Lumbar disc disease Other         Chronic pain syndrome       IPSS Questionnaire (AUA-7):  Over the past month…    1)  Incomplete Emptying  How often have you had a sensation of not emptying your bladder?  0 - Not at all   2)  Frequency  How often have you had to urinate less than every two hours? 5 - Almost always   3)  Intermittency  How often have you found you stopped and started again several times when you urinated?  3 - About half the time   4)  Urgency  How often have you found it difficult to postpone urination?  1 - Less than 1 time in 5   5) Weak Stream  How often have you had a weak urinary stream?  2 - Less than half the time   6) Straining  How often have you had to push or strain to begin urination?  0 - Not at all   7) Nocturia  How many times did you typically get up at night to urinate?  2 - 2 times   Total Score:  13   The International Prostate Symptom Score (IPSS) is used to screen, diagnose, track symptoms of benign prostatic hyperplasia (BPH).    0-7 pts (Mild Symptoms)  / 8-19 pts (Moderate) / 20-35 (Severe)    Quality of life due to urinary symptoms:  If you were to spend the rest of your life with your urinary condition the way it is now, how would you feel about that? 3-MixedP   Urine Leakage (Incontinence) 0-No Leakage     Sexual Health Inventory for Men (ANGI)   Over the past 6 months:     1. How do you rate your confidence that you could get and keep an erection?  2 - Low   2. When you had erections with sexual                                     stimulation, how often were your erections hard enough for penetration (entering your partner)?  5 - Almost always or always    3. During sexual intercourse, how often were you able to maintain your erection after you had penetrated (entered) your partner?  5 - Almost always or always    4. During sexual intercourse, how difficult was it to maintain your erection to completion of intercourse?  3 - Difficult    5. When you attempted sexual intercourse, how often was it satisfactory for you?  5 - Almost always or always     Total Score: 20   The Sexual Health Inventory for Men further classifies ED severity with the following breakpoints:   1-7 (Severe ED) 8-11 (Moderate ED) 12-16 (Mild to Moderate ED) 17-21 (Mild ED)      Post void residual bladder scan:   16 mL    Objective     Physical Exam:   Vital Signs:   Vitals:    08/26/21 1348   BP: 134/78   Pulse: 74   Temp: 98.2 °F (36.8 °C)  "  TempSrc: Temporal   SpO2: 97%   Weight: 83.6 kg (184 lb 3.2 oz)   Height: 182.9 cm (72.01\")   PainSc: 0-No pain     Body mass index is 24.98 kg/m².     Physical Exam  Vitals and nursing note reviewed.   Constitutional:       Appearance: Normal appearance.   HENT:      Head: Normocephalic and atraumatic.      Mouth/Throat:      Mouth: Mucous membranes are moist.      Pharynx: Oropharynx is clear.   Eyes:      Extraocular Movements: Extraocular movements intact.      Conjunctiva/sclera: Conjunctivae normal.   Cardiovascular:      Rate and Rhythm: Normal rate and regular rhythm.   Pulmonary:      Effort: Pulmonary effort is normal. No respiratory distress.   Abdominal:      Palpations: Abdomen is soft.      Tenderness: There is no abdominal tenderness. There is no right CVA tenderness or left CVA tenderness.   Genitourinary:     Comments:  Circumcised phallus, orthotopic meatus, bilaterally descended testicles without masses, or lesions.     NICHOLAS: Normal rectal tone, no blood, estimated 40 gram prostate without nodularity or firmness.   Musculoskeletal:         General: Normal range of motion.      Cervical back: Normal range of motion.   Skin:     General: Skin is warm and dry.   Neurological:      General: No focal deficit present.      Mental Status: He is alert and oriented to person, place, and time.   Psychiatric:         Mood and Affect: Mood normal.         Behavior: Behavior normal.         Labs:   Lab Results   Component Value Date    PSA 3.450 02/17/2021    PSA 2.230 02/12/2020    PSA 0.970 04/28/2017       Brief Urine Lab Results  (Last result in the past 365 days)      Color   Clarity   Blood   Leuk Est   Nitrite   Protein   CREAT   Urine HCG        08/26/21 1422 Yellow Clear Negative Negative Negative Negative                    Lab Results   Component Value Date    GLUCOSE 83 02/17/2021    CALCIUM 9.4 02/17/2021     02/17/2021    K 4.4 02/17/2021    CO2 30.8 (H) 02/17/2021     02/17/2021    " BUN 14 02/17/2021    CREATININE 0.79 02/17/2021    EGFRIFNONA 98 02/17/2021    BCR 17.7 02/17/2021    ANIONGAP 8.2 02/17/2021       Lab Results   Component Value Date    WBC 7.26 02/17/2021    HGB 14.0 02/17/2021    HCT 41.5 02/17/2021    MCV 93.5 02/17/2021     02/17/2021       Images:   No Images in the past 120 days found..    Measures:   Tobacco:   Anderson Goode  reports that he has never smoked. He has never used smokeless tobacco.        Urine Incontinence: ( NOUI)  Patient reports that he is not currently experiencing any symptoms of urinary incontinence.      Assessment / Plan      Assessment:  Mr. Goode is a 65 y.o. male who presented today with lower urinary tract symptoms.  The patient has been on longstanding doxazosin for the symptoms, he reports a stable strong urinary stream and nocturia 2 times a night.  His primary bothersome urinary complaint is urinary urgency.  He also complains of moderate frequency.  He continues to drink high-volume caffeine and drinks very little water during the day.  I discussed the obvious lifestyle issues including his high caffeine intake which can be modified to reduce his storage type symptoms.  I have encouraged him to cut out caffeine and replace with water to determine if symptom benefit.  I also discussed possible medical therapies which can be added to doxazosin to improve his storage symptoms, these medications are anticholinergics.  I discussed the risks and benefits of these medications.  He is interested in trialing this.  We will start him on 10 mg Vesicare and bring him back in 4 months to assess.    PVR 15 mL today, emptying well.       Diagnoses and all orders for this visit:    1. Urinary urgency (Primary)    - start 10 mg Vesicare, RTC 4 months for re-eval  - stop Caffeinated beverages (limit to 1 per day)    -     solifenacin (VESICARE) 10 MG tablet; Take 1 tablet by mouth Daily.  Dispense: 90 tablet; Refill: 3  -     POC Urinalysis  Dipstick, Automated    2. BPH with obstruction/lower urinary tract symptoms     - continue Doxazosin 4 mg daily          Follow Up:   Return in about 16 weeks (around 12/16/2021) for 4 month follow up.    I spent approximately 45 minutes providing clinical care for this patient; including review of patient's chart and provider documentation, face to face time spent with patient in examination room (obtaining history, performing physical exam, discussing diagnosis and management options), placing orders, and completing patient documentation.     Danilo Doe MD  [facility] [place]

## 2021-10-25 RX ORDER — DOXAZOSIN MESYLATE 4 MG/1
TABLET ORAL
Qty: 90 TABLET | Refills: 0 | Status: SHIPPED | OUTPATIENT
Start: 2021-10-25 | End: 2022-01-21

## 2021-10-25 RX ORDER — ATORVASTATIN CALCIUM 80 MG/1
TABLET, FILM COATED ORAL
Qty: 90 TABLET | Refills: 0 | Status: SHIPPED | OUTPATIENT
Start: 2021-10-25 | End: 2022-01-21

## 2021-10-25 NOTE — TELEPHONE ENCOUNTER
Rx Refill Note  Requested Prescriptions     Pending Prescriptions Disp Refills   • doxazosin (CARDURA) 4 MG tablet [Pharmacy Med Name: DOXAZOSIN MESYLATE 4 MG TAB] 90 tablet 0     Sig: TAKE ONE TABLET BY MOUTH EVERY EVENING   • atorvastatin (LIPITOR) 80 MG tablet [Pharmacy Med Name: ATORVASTATIN 80 MG TABLET] 90 tablet 0     Sig: TAKE ONE TABLET BY MOUTH EVERY NIGHT AT BEDTIME      Last office visit with prescribing clinician: 8/17/2021      Next office visit with prescribing clinician: 3/16/2022            Courtney Bowen MA  10/25/21, 08:16 EDT     Last labs 2/17/21

## 2021-12-27 ENCOUNTER — OFFICE VISIT (OUTPATIENT)
Dept: UROLOGY | Facility: CLINIC | Age: 66
End: 2021-12-27

## 2021-12-27 VITALS
WEIGHT: 188 LBS | OXYGEN SATURATION: 98 % | HEIGHT: 72 IN | TEMPERATURE: 98.3 F | DIASTOLIC BLOOD PRESSURE: 78 MMHG | BODY MASS INDEX: 25.47 KG/M2 | HEART RATE: 82 BPM | SYSTOLIC BLOOD PRESSURE: 142 MMHG

## 2021-12-27 DIAGNOSIS — N40.1 BPH WITH OBSTRUCTION/LOWER URINARY TRACT SYMPTOMS: ICD-10-CM

## 2021-12-27 DIAGNOSIS — N13.8 BPH WITH OBSTRUCTION/LOWER URINARY TRACT SYMPTOMS: ICD-10-CM

## 2021-12-27 DIAGNOSIS — R39.15 URINARY URGENCY: Primary | ICD-10-CM

## 2021-12-27 PROCEDURE — 99213 OFFICE O/P EST LOW 20 MIN: CPT | Performed by: STUDENT IN AN ORGANIZED HEALTH CARE EDUCATION/TRAINING PROGRAM

## 2021-12-27 NOTE — PROGRESS NOTES
Follow Up Office Visit      Patient Name: Anderson Goode  : 1955   MRN: [id number]     Chief Complaint:    Chief Complaint   Patient presents with   • Follow-up   • Urinary Urgency       Referring Provider: No ref. provider found    History of Present Illness: Anderson Goode is a 66 y.o. male who presents today for follow up of BPH and urgency/frequency.  I previously saw the patient 2021.  He has a history of allergic rhinitis, chronic low back pain, diverticulosis and colon polyps, GERD, hyperlipidemia.  He has been managed with doxazosin for a number of years.  He was placed on anticholinergic, 10 mg Vesicare at last visit due to some urgency related issues, frequency issues, and rare urgency related incontinence.  He also had a significant caffeine intake at last visit, drinking multiple Pepsi's and Mountain Dew's during the day.  He has cut out all of his caffeine and switch to Gatorade.     Today IPSS score is 6, very well controlled symptoms. IPSS was 13 at last visit.      He reports he has noticed a significant improvement in his symptoms since last visit.    Has a strong stream, no issues.    Reports holding his urine much better since starting Vesicare.     Denies UTIs or hematuria.  Patient was unable to urinate in clinic but he was bladder scanned at 94 mL.  Suspect this is related to recent urination slow filling of bladder.  Patient denies any incomplete emptying sensation.    Urinalysis completely negative today.    Reports the Vesicare caused some constipation after about a month of taking this, he said he switched to taking this medication every 48 hours with still improvement in his urinary symptoms but reduction in his constipation issues, I reported he can continue to do this if his symptoms are well controlled.      Subjective      Review of System: Review of Systems   Constitutional: Negative for chills, fatigue, fever and unexpected weight change.   HENT:  Negative for sore throat.    Eyes: Negative for visual disturbance.   Respiratory: Negative for cough, chest tightness and shortness of breath.    Cardiovascular: Negative for chest pain and leg swelling.   Gastrointestinal: Positive for constipation. Negative for blood in stool, diarrhea, nausea, rectal pain and vomiting.   Genitourinary: Negative for decreased urine volume, difficulty urinating, dysuria, enuresis, flank pain, frequency, genital sores, hematuria and urgency.   Musculoskeletal: Negative for back pain and joint swelling.   Skin: Negative for rash and wound.   Neurological: Negative for seizures, speech difficulty, weakness and headaches.   Psychiatric/Behavioral: Negative for confusion, sleep disturbance and suicidal ideas. The patient is not nervous/anxious.       I have reviewed the ROS documented by my clinical staff, I have updated appropriately and I agree. Danilo Doe MD    I have reviewed and the following portions of the patient's history were updated as appropriate: past family history, past medical history, past social history, past surgical history and problem list.    Medications:     Current Outpatient Medications:   •  Ascorbic Acid (VITAMIN C) 500 MG capsule, Take  by mouth daily., Disp: , Rfl:   •  aspirin 325 MG tablet, Take 325 mg by mouth Every Night., Disp: , Rfl:   •  Aspirin-Caffeine (CIRILO BACK & BODY PAIN EX ST) 500-32.5 MG tablet, Take  by mouth Every Morning., Disp: , Rfl:   •  atorvastatin (LIPITOR) 80 MG tablet, TAKE ONE TABLET BY MOUTH EVERY NIGHT AT BEDTIME, Disp: 90 tablet, Rfl: 0  •  Cetirizine HCl (ZyrTEC Allergy) 10 MG capsule, Take 10 mg by mouth Daily., Disp: 90 capsule, Rfl: 3  •  Cholecalciferol (VITAMIN D) 1000 UNITS tablet, Take 1 capsule by mouth., Disp: , Rfl:   •  doxazosin (CARDURA) 4 MG tablet, TAKE ONE TABLET BY MOUTH EVERY EVENING, Disp: 90 tablet, Rfl: 0  •  fluticasone (Flonase Sensimist) 27.5 MCG/SPRAY nasal spray, 2 sprays into the nostril(s)  as directed by provider Daily., Disp: 9.1 mL, Rfl: 12  •  Glucosamine HCl 1500 MG tablet, Take 1,500 mg by mouth daily., Disp: 30 each, Rfl:   •  hypromellose (ARTIFICIAL TEARS) 0.4 % solution, Apply  to eye., Disp: , Rfl:   •  Multiple Vitamins-Minerals (CENTRUM ADULTS PO), Take  by mouth daily., Disp: , Rfl:   •  solifenacin (VESICARE) 10 MG tablet, Take 1 tablet by mouth Daily., Disp: 90 tablet, Rfl: 3    Allergies:   Allergies   Allergen Reactions   • Cephalexin Rash   • Omeprazole GI Intolerance       IPSS Questionnaire (AUA-7):  Over the past month…    1)  Incomplete Emptying:       How often have you had a sensation of not emptying you had the sensation of not emptying your bladder completely after you finished urinating?  0 - Not at all   2)  Frequency:       How often have you had the urinate again less than two hours after you finished urinating?  0 - Not at all   3)  Intermittency:       How often have you found you stopped and started again several times when you urinated?   2 - Not at all   4) Urgency:      How often have you found it difficult to postpone urination?  1 - Not at all   5) Weak Stream:      How often have you had a weak urinary stream?  1 - Not at all   6) Straining:       How often have you had to push or strain to begin urination?  0 - Not at all   7) Nocturia:      How many times did you most typically get up to urinate from the time you went to bed at night until the time you got up in the morning?  2 - 2 times   Total Score:  6   The International Prostate Symptom Score (IPSS) is used to screen, diagnose, track symptoms of benign prostatic hyperplasia (BPH).   0-7 (Mild Symptoms) 8-19 (Moderate) 20-35 (Severe)   Quality of Life (QoL):  If you were to spend the rest of your life with your urinary condition just the way it is now, how would you feel about that? 2-Mostly Satisfied   Urine Leakage (Incontinence) 0-No Leakage     Sexual Health Inventory for Men (ANGI)   Over the past 6  "months:     1. How do you rate your confidence that you could get and keep an erection?  0 - No sexual activity    2. When you had erections with sexual  stimulation, how often were your erections hard enough for penetration (entering your partner)?  0 - No Sexual Activity    3. During sexual intercourse, how often were you able to maintain your erection after you had penetrated (entered) your partner?  0 - Did not attempt intercourse   4. During sexual intercourse, how difficult was it to maintain your erection to completion of intercourse?  0 - Did not attempt intercourse   5. When you attempted sexual intercourse, how often was it satisfactory for you?  0 - Did not attempt intercourse    Total Score: 0   The Sexual Health Inventory for Men further classifies ED severity with the following breakpoints:   1-7 (Severe ED) 8-11 (Moderate ED) 12-16 (Mild to Moderate ED) 17-21 (Mild ED)      Post void residual bladder scan:   94 mL     Objective     Physical Exam:   Vital Signs:   Vitals:    12/27/21 1319   BP: 142/78   Pulse: 82   Temp: 98.3 °F (36.8 °C)   TempSrc: Temporal   SpO2: 98%   Weight: 85.3 kg (188 lb)   Height: 182.9 cm (72.01\")     Body mass index is 25.49 kg/m².     Physical Exam  Vitals and nursing note reviewed.   Constitutional:       Appearance: Normal appearance.   HENT:      Head: Normocephalic and atraumatic.      Mouth/Throat:      Mouth: Mucous membranes are moist.      Pharynx: Oropharynx is clear.   Eyes:      Extraocular Movements: Extraocular movements intact.      Conjunctiva/sclera: Conjunctivae normal.   Cardiovascular:      Rate and Rhythm: Normal rate and regular rhythm.   Pulmonary:      Effort: Pulmonary effort is normal. No respiratory distress.   Abdominal:      Palpations: Abdomen is soft.      Tenderness: There is no abdominal tenderness. There is no right CVA tenderness or left CVA tenderness.   Genitourinary:     Comments:  Deferred  Musculoskeletal:         General: Normal range " of motion.      Cervical back: Normal range of motion.   Skin:     General: Skin is warm and dry.   Neurological:      General: No focal deficit present.      Mental Status: He is alert and oriented to person, place, and time.   Psychiatric:         Mood and Affect: Mood normal.         Behavior: Behavior normal.         Labs:   Brief Urine Lab Results  (Last result in the past 365 days)      Color   Clarity   Blood   Leuk Est   Nitrite   Protein   CREAT   Urine HCG        08/26/21 1422 Yellow   Clear   Negative   Negative   Negative   Negative                      Lab Results   Component Value Date    GLUCOSE 83 02/17/2021    CALCIUM 9.4 02/17/2021     02/17/2021    K 4.4 02/17/2021    CO2 30.8 (H) 02/17/2021     02/17/2021    BUN 14 02/17/2021    CREATININE 0.79 02/17/2021    EGFRIFNONA 98 02/17/2021    BCR 17.7 02/17/2021    ANIONGAP 8.2 02/17/2021       Lab Results   Component Value Date    WBC 7.26 02/17/2021    HGB 14.0 02/17/2021    HCT 41.5 02/17/2021    MCV 93.5 02/17/2021     02/17/2021       Images:   No Images in the past 120 days found..    Measures:   Tobacco:   Anderson Vines Russel  reports that he has never smoked. He has never used smokeless tobacco..        Assessment / Plan      Assessment/Plan:   66 y.o. male who presented today for follow up of BPH and urinary urgency/frequency.  Patient is managed with doxazosin and 10 mg Vesicare with significant improvement in his symptoms.  He also cut down his caffeine intake and this is significantly improved his urinary urgency, he has no other issues with urgency related incontinence.  He reports he is able to sit at football games for a number of hours and hold his urine without significant issue, also reports improvement when he is driving on the highway.  He can continue to take his Vesicare every 48 hours if this is working for him, given that he developed some constipation.  We talked about over-the-counter laxatives and stool  softeners which he has been taking as well sometimes.  He can see me back in 6 months.    He will be due for PSA check in March 2022 with his primary care provider, we will address his PSA at follow-up in 6 months, unless significant elevation which may require sooner follow-up with me, will defer to PCP for repeat PSA in March.    Diagnoses and all orders for this visit:    1. Urinary urgency (Primary)  2. BPH with obstruction/lower urinary tract symptoms  - continue Doxazosin daily  - continue 10 mg Vesicare q48 (patient preference given intermittent constipation)          Follow Up:   Return in about 6 months (around 6/27/2022).    I spent approximately 20 minutes providing clinical care for this patient; including review of patient's chart and provider documentation, face to face time spent with patient in examination room (obtaining history, performing physical exam, discussing diagnosis and management options), placing orders, and completing patient documentation.     Danilo Doe MD  [facility] [place]

## 2022-01-21 RX ORDER — ATORVASTATIN CALCIUM 80 MG/1
TABLET, FILM COATED ORAL
Qty: 90 TABLET | Refills: 0 | Status: SHIPPED | OUTPATIENT
Start: 2022-01-21 | End: 2022-04-23

## 2022-01-21 RX ORDER — DOXAZOSIN MESYLATE 4 MG/1
TABLET ORAL
Qty: 90 TABLET | Refills: 0 | Status: SHIPPED | OUTPATIENT
Start: 2022-01-21 | End: 2022-04-23

## 2022-01-21 NOTE — TELEPHONE ENCOUNTER
Rx Refill Note  Requested Prescriptions     Pending Prescriptions Disp Refills   • doxazosin (CARDURA) 4 MG tablet [Pharmacy Med Name: DOXAZOSIN MESYLATE 4 MG TAB] 90 tablet 0     Sig: TAKE ONE TABLET BY MOUTH EVERY EVENING   • atorvastatin (LIPITOR) 80 MG tablet [Pharmacy Med Name: ATORVASTATIN 80 MG TABLET] 90 tablet 0     Sig: TAKE ONE TABLET BY MOUTH EVERY NIGHT AT BEDTIME      Last office visit with prescribing clinician: 8/17/2021      Next office visit with prescribing clinician: 3/16/2022            Kali Garcia MA  01/21/22, 07:44 EST

## 2022-03-16 ENCOUNTER — OFFICE VISIT (OUTPATIENT)
Dept: FAMILY MEDICINE CLINIC | Facility: CLINIC | Age: 67
End: 2022-03-16

## 2022-03-16 VITALS
BODY MASS INDEX: 26.14 KG/M2 | OXYGEN SATURATION: 94 % | HEART RATE: 83 BPM | DIASTOLIC BLOOD PRESSURE: 78 MMHG | RESPIRATION RATE: 16 BRPM | HEIGHT: 72 IN | WEIGHT: 193 LBS | SYSTOLIC BLOOD PRESSURE: 130 MMHG

## 2022-03-16 DIAGNOSIS — Z00.00 MEDICARE ANNUAL WELLNESS VISIT, SUBSEQUENT: Primary | ICD-10-CM

## 2022-03-16 DIAGNOSIS — Z00.00 PREVENTATIVE HEALTH CARE: ICD-10-CM

## 2022-03-16 DIAGNOSIS — E78.00 PURE HYPERCHOLESTEROLEMIA: ICD-10-CM

## 2022-03-16 DIAGNOSIS — H91.91 HEARING LOSS OF RIGHT EAR, UNSPECIFIED HEARING LOSS TYPE: ICD-10-CM

## 2022-03-16 DIAGNOSIS — Z13.29 SCREENING FOR ENDOCRINE DISORDER: ICD-10-CM

## 2022-03-16 DIAGNOSIS — E55.9 VITAMIN D DEFICIENCY: ICD-10-CM

## 2022-03-16 DIAGNOSIS — Z13.0 SCREENING FOR DEFICIENCY ANEMIA: ICD-10-CM

## 2022-03-16 DIAGNOSIS — R73.9 HYPERGLYCEMIA: ICD-10-CM

## 2022-03-16 LAB
EXPIRATION DATE: NORMAL
HBA1C MFR BLD: 5.4 %
Lab: NORMAL

## 2022-03-16 PROCEDURE — 1170F FXNL STATUS ASSESSED: CPT | Performed by: FAMILY MEDICINE

## 2022-03-16 PROCEDURE — G0439 PPPS, SUBSEQ VISIT: HCPCS | Performed by: FAMILY MEDICINE

## 2022-03-16 PROCEDURE — 1159F MED LIST DOCD IN RCRD: CPT | Performed by: FAMILY MEDICINE

## 2022-03-16 PROCEDURE — 83036 HEMOGLOBIN GLYCOSYLATED A1C: CPT | Performed by: FAMILY MEDICINE

## 2022-03-16 PROCEDURE — 1126F AMNT PAIN NOTED NONE PRSNT: CPT | Performed by: FAMILY MEDICINE

## 2022-03-16 PROCEDURE — 3044F HG A1C LEVEL LT 7.0%: CPT | Performed by: FAMILY MEDICINE

## 2022-03-16 PROCEDURE — 99397 PER PM REEVAL EST PAT 65+ YR: CPT | Performed by: FAMILY MEDICINE

## 2022-03-16 RX ORDER — IPRATROPIUM BROMIDE 21 UG/1
1 SPRAY, METERED NASAL AS NEEDED
COMMUNITY
Start: 2022-01-03

## 2022-03-16 NOTE — PROGRESS NOTES
The ABCs of the Annual Wellness Visit  Subsequent Medicare Wellness Visit    Chief Complaint   Patient presents with   • Medicare Wellness-subsequent   • Hyperglycemia      Subjective    History of Present Illness:  Anderson Goode is a 66 y.o. male who presents for a Subsequent Medicare Wellness Visit.    The patient reports that he has been doing well since 08/2021. He states that he has been to an ENT specialist and was given a prescription for a nasal spray that helped a lot. He reports that he takes Zyrtec and uses a nasal spray that keeps his sinuses cleared. He states that after he started taking the nasal spray, he woke up in the morning with phlegm in his throat. He reports that he coughed it loose and it made his throat bleed. He reports that he ran out of the medication last month for a few days. He reports that when he got it back, he started it and after a couple of days, he woke up in the morning with swelling in his throat and coughed it loose and it caused it to bleed again. He reports that he does not normally have that stuff in his throat when he takes it regularly, but he had not been taking it for a few days. He reports that the new nasal spray dries the phlegm up so much that it is almost like super glue down in his throat. He reports that once he has taken it for a couple of days, he does not get that drainage.    He reports that he thinks the Vesicare is helping. He reports that he was taking it every day, but it was causing him to be constipated, so he cut back to every other day, and it still works fine for the urinary problem. He reports that he does not get constipated taking it every other day. He reports that he talked to him about that, and he was told that was fine to do it that way.    He reports that he is still smoking some, but not cigarettes. He reports that he drinks 1 beer a day before he goes to bed at night. He reports that he has now taken a regular aspirin for the  last couple of weeks.     He reports that he hurt his knee last year, but it is feeling better. He denies any chest pain or shortness of breath. He reports that he sits and uses his cell phone to read the paper every day and it causes some arm discomfort. He reports that it seems to go away when he stops reading the paper. He reports that when he had his knee problem last year, he was having problems with stairs, but nothing in his arm.    He reports that he seems to be losing some hearing in his ear. He reports that his eye does not open completely on that side. He reports that he had surgery on his eye 10 years ago. He denies any dizziness. He reports that he saw Dr. Hendrix in 08/2021 or 09/2021, and he thinks he removed some hemorrhoids. He states that in 02/2022 he saw blood in his bowel movements.  The patient states he takes FiberCon.  He states that he drinks a lot of water everyday like maybe, 48 to 64 ounces.     The patient will come back for lab work because he has eaten today.    The following portions of the patient's history were reviewed and   updated as appropriate: allergies, current medications, past family history, past medical history, past social history, past surgical history and problem list.    Compared to one year ago, the patient feels his physical   health is better.    Compared to one year ago, the patient feels his mental   health is the same.    Recent Hospitalizations:  He was not admitted to the hospital during the last year.       Current Medical Providers:  Patient Care Team:  Luis Shields DO as PCP - General (Family Medicine)  Slava Bassett MD as Consulting Physician (Otolaryngology)  Trish Dan MD as Consulting Physician (Otolaryngology)  Danilo Doe MD as Consulting Physician (Urology)    Outpatient Medications Prior to Visit   Medication Sig Dispense Refill   • Ascorbic Acid (VITAMIN C) 500 MG capsule Take  by mouth daily.     • aspirin 325 MG  tablet Take 325 mg by mouth Every Night.     • Aspirin-Caffeine 500-32.5 MG tablet Take  by mouth Every Morning.     • atorvastatin (LIPITOR) 80 MG tablet TAKE ONE TABLET BY MOUTH EVERY NIGHT AT BEDTIME 90 tablet 0   • Cetirizine HCl (ZyrTEC Allergy) 10 MG capsule Take 10 mg by mouth Daily. 90 capsule 3   • Cholecalciferol (VITAMIN D) 1000 UNITS tablet Take 1 capsule by mouth.     • doxazosin (CARDURA) 4 MG tablet TAKE ONE TABLET BY MOUTH EVERY EVENING 90 tablet 0   • fluticasone (Flonase Sensimist) 27.5 MCG/SPRAY nasal spray 2 sprays into the nostril(s) as directed by provider Daily. 9.1 mL 12   • Glucosamine HCl 1500 MG tablet Take 1,500 mg by mouth daily. 30 each    • hypromellose (NATURES TEARS) 0.4 % solution Apply  to eye.     • Multiple Vitamins-Minerals (CENTRUM ADULTS PO) Take  by mouth daily.     • solifenacin (VESICARE) 10 MG tablet Take 1 tablet by mouth Daily. 90 tablet 3   • ipratropium (ATROVENT) 0.03 % nasal spray 1 spray into the nostril(s) as directed by provider As Needed.       No facility-administered medications prior to visit.       No opioid medication identified on active medication list. I have reviewed chart for other potential  high risk medication/s and harmful drug interactions in the elderly.          Aspirin is on active medication list. Aspirin use is not indicated based on review of current medical condition/s. Risk of harm outweighs potential benefits. Patient instructed to discontinue this medication.  .      Patient Active Problem List   Diagnosis   • Gastroesophageal reflux disease   • Atopic rhinitis   • Generalized osteoarthritis   • Hyperglycemia   • Hyperlipidemia   • Left anterior fascicular block   • Prostatism   • Chronic pain of both shoulders   • Vitamin D deficiency   • Preventative health care   • Colon adenomas   • RBBB   • Diverticulosis   • Urinary urgency   • BPH with obstruction/lower urinary tract symptoms     Advance Care Planning  Advance Directive is not on  "file.  ACP discussion was held with the patient during this visit. Patient does not have an advance directive, information provided.          Objective    Vitals:    22 1136   BP: 130/78   Pulse: 83   Resp: 16   SpO2: 94%   Weight: 87.5 kg (193 lb)   Height: 182.9 cm (72.01\")   PainSc: 0-No pain     BMI Readings from Last 1 Encounters:   22 26.17 kg/m²   BMI is above normal parameters. Recommendations include: exercise counseling and nutrition counseling    Does the patient have evidence of cognitive impairment? No   ATTENTION  What is the year: correct  What is the month of the year: correct  What is the day of the week?: correct  What is the date?: correct  MEMORY  Repeat address three times, only score third attempt: Cortez Ash [address] [place] [address]: 7  HOW MANY ANIMALS DID THE PATIENT NAME  Verbal Fluency -- Animal Names (0-25): 17-21  CLOCK DRAWING  Clock Drawing: All Correct  MEMORY RECALL  Tell me what you remember about that name and address we were repeating at the beginnin  ACE TOTAL SCORE  Total ACE Score - <25/30 strongly suggests cognitive impairment; <21/30 almost certainly shows dementia: 27      Physical Exam  Lab Results   Component Value Date    TRIG 47 2022    HDL 52 2022    LDL 51 2022    VLDL 11 2022    HGBA1C 5.4 2022        Const: NAD, A&Ox4, Pleasant, Cooperative  Eyes: EOMI, no conjunctivitis  ENT: No nasal discharge present, neck supple  Cardiac: Regular rate and rhythm, no cyanosis  Resp: Respiratory rate within normal limits, no increased work of breathing, no audible wheezing or retractions noted  GI: No distention or ascites  MSK: Motor and sensation grossly intact in bilateral upper extremities  Neurologic: CN II-XII grossly intact  Psych: Appropriate mood and behavior.  Skin: Pink, warm, dry    HEALTH RISK ASSESSMENT    Smoking Status:  Social History     Tobacco Use   Smoking Status Never Smoker   Smokeless Tobacco Never " Used     Alcohol Consumption:  Social History     Substance and Sexual Activity   Alcohol Use Yes   • Alcohol/week: 7.0 standard drinks   • Types: 7 Cans of beer per week    Comment: wife smoked 2 PPD - 34 years     Fall Risk Screen:    AGUSTINA Fall Risk Assessment was completed, and patient is at LOW risk for falls.Assessment completed on:3/16/2022    Depression Screening:  PHQ-2/PHQ-9 Depression Screening 3/16/2022   Retired Total Score -   Little Interest or Pleasure in Doing Things 0-->not at all   Feeling Down, Depressed or Hopeless 0-->not at all   PHQ-9: Brief Depression Severity Measure Score 0       Health Habits and Functional and Cognitive Screening:  Functional & Cognitive Status 3/16/2022   Do you have difficulty preparing food and eating? No   Do you have difficulty bathing yourself, getting dressed or grooming yourself? No   Do you have difficulty using the toilet? No   Do you have difficulty moving around from place to place? No   Do you have trouble with steps or getting out of a bed or a chair? No   Current Diet Well Balanced Diet   Dental Exam Up to date   Eye Exam Up to date   Exercise (times per week) 7 times per week   Current Exercises Include Walking   Current Exercise Activities Include -   Do you need help using the phone?  No   Are you deaf or do you have serious difficulty hearing?  No   Do you need help with transportation? No   Do you need help shopping? No   Do you need help preparing meals?  No   Do you need help with housework?  No   Do you need help with laundry? No   Do you need help taking your medications? No   Do you need help managing money? No   Do you ever drive or ride in a car without wearing a seat belt? No   Have you felt unusual stress, anger or loneliness in the last month? No   Who do you live with? Alone   If you need help, do you have trouble finding someone available to you? No   Have you been bothered in the last four weeks by sexual problems? No   Do you have  difficulty concentrating, remembering or making decisions? No       Age-appropriate Screening Schedule:  Refer to the list below for future screening recommendations based on patient's age, sex and/or medical conditions. Orders for these recommended tests are listed in the plan section. The patient has been provided with a written plan.    Health Maintenance   Topic Date Due   • ZOSTER VACCINE (2 of 3) 08/26/2015   • TDAP/TD VACCINES (2 - Td or Tdap) 05/11/2020   • LIPID PANEL  03/17/2023   • INFLUENZA VACCINE  Discontinued              Assessment/Plan   CMS Preventative Services Quick Reference  Risk Factors Identified During Encounter  Alcohol Misuse  Drug Use/Abuse Identified or Suspected  The above risks/problems have been discussed with the patient.  Follow up actions/plans if indicated are seen below in the Assessment/Plan Section.  Pertinent information has been shared with the patient in the After Visit Summary.    Diagnoses and all orders for this visit:    1. Medicare annual wellness visit, subsequent (Primary)  - He is doing well overall.  - He will have blood work done today.    2. Preventative health care    3. Hyperglycemia  -     Cancel: Hemoglobin A1c; Future  -     POC Glycosylated Hemoglobin (Hb A1C)    4. Vitamin D deficiency  -     Vitamin D 25 Hydroxy; Future    5. Pure hypercholesterolemia  - He will continue taking his current medications as prescribed.  -     Lipid Panel; Future    6. Screening for deficiency anemia  -     CBC (No Diff); Future    7. Screening for endocrine disorder  -     Comprehensive Metabolic Panel; Future  -     Urinalysis With Microscopic If Indicated (No Culture) - Urine, Clean Catch; Future  -     TSH; Future    8. Hearing loss of right ear, unspecified hearing loss type  -     Ambulatory Referral to Audiology      Problem List Items Addressed This Visit        Cardiac and Vasculature    Hyperlipidemia    Overview     Atorvastatin 80 mg, full-strength aspirin            Relevant Orders    Lipid Panel (Completed)       Endocrine and Metabolic    Hyperglycemia    Overview     Diet controlled           Relevant Orders    POC Glycosylated Hemoglobin (Hb A1C) (Completed)    Vitamin D deficiency    Relevant Orders    Vitamin D 25 Hydroxy (Completed)       Health Encounters    Preventative health care      Other Visit Diagnoses     Medicare annual wellness visit, subsequent    -  Primary    Screening for deficiency anemia        Relevant Orders    CBC (No Diff) (Completed)    Screening for endocrine disorder        Relevant Orders    Comprehensive Metabolic Panel (Completed)    Urinalysis With Microscopic If Indicated (No Culture) - Urine, Clean Catch (Completed)    TSH (Completed)    Hearing loss of right ear, unspecified hearing loss type        Relevant Orders    Ambulatory Referral to Audiology (Completed)          Follow Up:   Return in about 6 months (around 9/16/2022) for with A1c;.     An After Visit Summary and PPPS were made available to the patient.        Transcribed from ambient dictation for Luis Shields DO by Annette Dan/Stephanie Maurice.  03/16/22   13:59 EDT    Patient verbalized consent to the visit recording.  I have personally performed the services described in this document as transcribed by the above individual, and it is both accurate and complete.  Luis Shields DO  3/28/2022  08:02 EDT

## 2022-03-16 NOTE — PATIENT INSTRUCTIONS
Advance Care Planning and Advance Directives     You make decisions on a daily basis - decisions about where you want to live, your career, your home, your life. Perhaps one of the most important decisions you face is your choice for future medical care. Take time to talk with your family and your healthcare team and start planning today.  Advance Care Planning is a process that can help you:  Understand possible future healthcare decisions in light of your own experiences  Reflect on those decision in light of your goals and values  Discuss your decisions with those closest to you and the healthcare professionals that care for you  Make a plan by creating a document that reflects your wishes    Surrogate Decision Maker  In the event of a medical emergency, which has left you unable to communicate or to make your own decisions, you would need someone to make decisions for you.  It is important to discuss your preferences for medical treatment with this person while you are in good health.     Qualities of a surrogate decision maker:  Willing to take on this role and responsibility  Knows what you want for future medical care  Willing to follow your wishes even if they don't agree with them  Able to make difficult medical decisions under stressful circumstances    Advance Directives  These are legal documents you can create that will guide your healthcare team and decision maker(s) when needed. These documents can be stored in the electronic medical record.    Living Will - a legal document to guide your care if you have a terminal condition or a serious illness and are unable to communicate. States vary by statute in document names/types, but most forms may include one or more of the following:        -  Directions regarding life-prolonging treatments        -  Directions regarding artificially provided nutrition/hydration        -  Choosing a healthcare decision maker        -  Direction regarding organ/tissue  donation    Durable Power of  for Healthcare - this document names an -in-fact to make medical decisions for you, but it may also allow this person to make personal and financial decisions for you. Please seek the advice of an  if you need this type of document.    **Advance Directives are not required and no one may discriminate against you if you do not sign one.    Medical Orders  Many states allow specific forms/orders signed by your physician to record your wishes for medical treatment in your current state of health. This form, signed in personal communication with your physician, addresses resuscitation and other medical interventions that you may or may not want.      For more information or to schedule a time with a [facility] Advance Care Planning Facilitator contact: [facility] or call [phone] and someone will contact you directly.  You are due for adacel Tdap vaccination. (provides protection against tetanus, diptheria and whooping cough) Please  get the immunization at your local pharmacy at your earliest convenience. This immunization will next be due in 10 years. Please click on the link for more information about this vaccine.    [facility] Tdap Vaccine Information    You are due for Shingrix vaccination series ( the newest shingles vaccine).  It is a two shot series spaced 2-6 months apart. Please get this vaccine series started at your earliest convenience at your local pharmacy to help avoid shingles outbreak. It is more effective than the old Zostavax vaccine and is recommended even if you have had the Zostavax vaccine in the past.  Once the Shingrix series is completed, it does not need to be repeated.   For more information, please look at the website below:  [facility] Shingrix Vaccine Information      Medicare Wellness  Personal Prevention Plan of Service     Date of Office Visit:    Encounter Provider:  Luis Shields DO  Place of Service:  [facility]  Patient Name: Anderson Goode  :  1955    As part of the Medicare Wellness portion of your visit today, we are providing you with this personalized preventive plan of services (PPPS). This plan is based upon recommendations of the [place] Preventive Services Task Force (USPSTF) and the Advisory Committee on Immunization Practices (ACIP).    This lists the preventive care services that should be considered, and provides dates of when you are due. Items listed as completed are up-to-date and do not require any further intervention.    Health Maintenance   Topic Date Due    ZOSTER VACCINE (2 of 3) 2015    Pneumococcal Vaccine 65+ (1 of 1 - PPSV23) Never done    TDAP/TD VACCINES (2 - Td or Tdap) 2020    LIPID PANEL  2022    ANNUAL WELLNESS VISIT  2023    COLORECTAL CANCER SCREENING  2029    HEPATITIS C SCREENING  Completed    COVID-19 Vaccine  Completed    INFLUENZA VACCINE  Discontinued       Orders Placed This Encounter   Procedures    CBC (No Diff)     Standing Status:   Future     Standing Expiration Date:   3/16/2023     Order Specific Question:   Release to patient     Answer:   Immediate    Lipid Panel     Standing Status:   Future     Standing Expiration Date:   3/16/2023    Hemoglobin A1c     Standing Status:   Future     Standing Expiration Date:   3/16/2023     Order Specific Question:   Release to patient     Answer:   Immediate    Comprehensive Metabolic Panel     Standing Status:   Future     Standing Expiration Date:   3/16/2023     Order Specific Question:   Release to patient     Answer:   Immediate    Urinalysis With Microscopic If Indicated (No Culture) - Urine, Clean Catch     Standing Status:   Future     Standing Expiration Date:   3/16/2023     Order Specific Question:   Release to patient     Answer:   Immediate    TSH     Standing Status:   Future     Standing Expiration Date:   3/16/2023     Order Specific Question:    Release to patient     Answer:   Immediate    Vitamin D 25 Hydroxy     Standing Status:   Future     Standing Expiration Date:   3/16/2023     Order Specific Question:   Release to patient     Answer:   Immediate       Return in about 6 months (around 9/16/2022) for with A1c;.

## 2022-03-17 ENCOUNTER — LAB (OUTPATIENT)
Dept: LAB | Facility: HOSPITAL | Age: 67
End: 2022-03-17

## 2022-03-17 DIAGNOSIS — E55.9 VITAMIN D DEFICIENCY: ICD-10-CM

## 2022-03-17 DIAGNOSIS — Z13.29 SCREENING FOR ENDOCRINE DISORDER: ICD-10-CM

## 2022-03-17 DIAGNOSIS — E78.00 PURE HYPERCHOLESTEROLEMIA: ICD-10-CM

## 2022-03-17 DIAGNOSIS — Z13.0 SCREENING FOR DEFICIENCY ANEMIA: ICD-10-CM

## 2022-03-17 LAB
25(OH)D3 SERPL-MCNC: 40.3 NG/ML (ref 30–100)
ALBUMIN SERPL-MCNC: 4.4 G/DL (ref 3.5–5.2)
ALBUMIN/GLOB SERPL: 1.4 G/DL
ALP SERPL-CCNC: 67 U/L (ref 39–117)
ALT SERPL W P-5'-P-CCNC: 30 U/L (ref 1–41)
ANION GAP SERPL CALCULATED.3IONS-SCNC: 8.1 MMOL/L (ref 5–15)
AST SERPL-CCNC: 29 U/L (ref 1–40)
BILIRUB SERPL-MCNC: 1 MG/DL (ref 0–1.2)
BUN SERPL-MCNC: 16 MG/DL (ref 8–23)
BUN/CREAT SERPL: 18.6 (ref 7–25)
CALCIUM SPEC-SCNC: 9.5 MG/DL (ref 8.6–10.5)
CHLORIDE SERPL-SCNC: 103 MMOL/L (ref 98–107)
CHOLEST SERPL-MCNC: 114 MG/DL (ref 0–200)
CO2 SERPL-SCNC: 27.9 MMOL/L (ref 22–29)
CREAT SERPL-MCNC: 0.86 MG/DL (ref 0.76–1.27)
DEPRECATED RDW RBC AUTO: 46.9 FL (ref 37–54)
EGFRCR SERPLBLD CKD-EPI 2021: 94.9 ML/MIN/1.73
ERYTHROCYTE [DISTWIDTH] IN BLOOD BY AUTOMATED COUNT: 12.9 % (ref 12.3–15.4)
GLOBULIN UR ELPH-MCNC: 3.2 GM/DL
GLUCOSE SERPL-MCNC: 96 MG/DL (ref 65–99)
HCT VFR BLD AUTO: 41.7 % (ref 37.5–51)
HDLC SERPL-MCNC: 52 MG/DL (ref 40–60)
HGB BLD-MCNC: 13.9 G/DL (ref 13–17.7)
LDLC SERPL CALC-MCNC: 51 MG/DL (ref 0–100)
LDLC/HDLC SERPL: 1.01 {RATIO}
MCH RBC QN AUTO: 32.3 PG (ref 26.6–33)
MCHC RBC AUTO-ENTMCNC: 33.3 G/DL (ref 31.5–35.7)
MCV RBC AUTO: 96.8 FL (ref 79–97)
PLATELET # BLD AUTO: 202 10*3/MM3 (ref 140–450)
PMV BLD AUTO: 10.1 FL (ref 6–12)
POTASSIUM SERPL-SCNC: 4.8 MMOL/L (ref 3.5–5.2)
PROT SERPL-MCNC: 7.6 G/DL (ref 6–8.5)
RBC # BLD AUTO: 4.31 10*6/MM3 (ref 4.14–5.8)
SODIUM SERPL-SCNC: 139 MMOL/L (ref 136–145)
TRIGL SERPL-MCNC: 47 MG/DL (ref 0–150)
TSH SERPL DL<=0.05 MIU/L-ACNC: 2.08 UIU/ML (ref 0.27–4.2)
VLDLC SERPL-MCNC: 11 MG/DL (ref 5–40)
WBC NRBC COR # BLD: 7.3 10*3/MM3 (ref 3.4–10.8)

## 2022-03-17 PROCEDURE — 81001 URINALYSIS AUTO W/SCOPE: CPT

## 2022-03-17 PROCEDURE — 82306 VITAMIN D 25 HYDROXY: CPT

## 2022-03-17 PROCEDURE — 85027 COMPLETE CBC AUTOMATED: CPT

## 2022-03-17 PROCEDURE — 80053 COMPREHEN METABOLIC PANEL: CPT

## 2022-03-17 PROCEDURE — 80061 LIPID PANEL: CPT

## 2022-03-17 PROCEDURE — 84443 ASSAY THYROID STIM HORMONE: CPT

## 2022-03-18 LAB
BACTERIA UR QL AUTO: ABNORMAL /HPF
BILIRUB UR QL STRIP: NEGATIVE
CLARITY UR: CLEAR
COLOR UR: YELLOW
GLUCOSE UR STRIP-MCNC: NEGATIVE MG/DL
HGB UR QL STRIP.AUTO: NEGATIVE
HYALINE CASTS UR QL AUTO: ABNORMAL /LPF
KETONES UR QL STRIP: NEGATIVE
LEUKOCYTE ESTERASE UR QL STRIP.AUTO: ABNORMAL
NITRITE UR QL STRIP: NEGATIVE
PH UR STRIP.AUTO: 6 [PH] (ref 5–8)
PROT UR QL STRIP: NEGATIVE
RBC # UR STRIP: ABNORMAL /HPF
REF LAB TEST METHOD: ABNORMAL
SP GR UR STRIP: 1.02 (ref 1–1.03)
SQUAMOUS #/AREA URNS HPF: ABNORMAL /HPF
UROBILINOGEN UR QL STRIP: ABNORMAL
WBC # UR STRIP: ABNORMAL /HPF

## 2022-04-23 RX ORDER — DOXAZOSIN MESYLATE 4 MG/1
TABLET ORAL
Qty: 90 TABLET | Refills: 0 | Status: SHIPPED | OUTPATIENT
Start: 2022-04-23 | End: 2022-06-27

## 2022-04-23 RX ORDER — ATORVASTATIN CALCIUM 80 MG/1
TABLET, FILM COATED ORAL
Qty: 90 TABLET | Refills: 0 | Status: SHIPPED | OUTPATIENT
Start: 2022-04-23 | End: 2022-07-22

## 2022-04-23 NOTE — TELEPHONE ENCOUNTER
Rx Refill Note  Requested Prescriptions     Pending Prescriptions Disp Refills   • doxazosin (CARDURA) 4 MG tablet [Pharmacy Med Name: DOXAZOSIN MESYLATE 4 MG TAB] 90 tablet 0     Sig: TAKE ONE TABLET BY MOUTH EVERY EVENING   • atorvastatin (LIPITOR) 80 MG tablet [Pharmacy Med Name: ATORVASTATIN 80 MG TABLET] 90 tablet 0     Sig: TAKE ONE TABLET BY MOUTH EVERY NIGHT AT BEDTIME      Last office visit with prescribing clinician: 3/16/2022      Next office visit with prescribing clinician: 9/16/2022            Courtney Bowen MA  04/23/22, 11:26 EDT

## 2022-06-23 ENCOUNTER — TRANSCRIBE ORDERS (OUTPATIENT)
Dept: PHYSICAL THERAPY | Facility: CLINIC | Age: 67
End: 2022-06-23

## 2022-06-23 ENCOUNTER — TREATMENT (OUTPATIENT)
Dept: PHYSICAL THERAPY | Facility: CLINIC | Age: 67
End: 2022-06-23

## 2022-06-23 DIAGNOSIS — G89.29 CHRONIC PAIN OF LEFT ANKLE: Primary | ICD-10-CM

## 2022-06-23 DIAGNOSIS — M25.572 CHRONIC PAIN OF LEFT ANKLE: Primary | ICD-10-CM

## 2022-06-23 PROCEDURE — 97162 PT EVAL MOD COMPLEX 30 MIN: CPT | Performed by: PHYSICAL THERAPIST

## 2022-06-23 PROCEDURE — 97110 THERAPEUTIC EXERCISES: CPT | Performed by: PHYSICAL THERAPIST

## 2022-06-23 PROCEDURE — 97140 MANUAL THERAPY 1/> REGIONS: CPT | Performed by: PHYSICAL THERAPIST

## 2022-06-23 NOTE — PROGRESS NOTES
Physical Therapy Initial Evaluation and Plan of Care      Patient: Anderson Goode   : 1955  Diagnosis/ICD-10 Code:  Chronic pain of left ankle [M25.572, G89.29]  Referring practitioner: Jovi Horn PA-C    Subjective Evaluation    History of Present Illness  Date of onset: 2022  Date of surgery: 2022  Mechanism of injury: Slipped in mud at a park    Subjective comment: Was walking behind the pool at [facility] when he slipped in the mud and broke his ankle.  Went to ED ([place]) the day of the injury.  Was referred to orthopaedics to have surgery.  Was placed in a walking boot and had the cast removed on 22.  Denies nocturnal pain.  Has infrequent tingling along both sides of the ankles.    Patient Occupation: Retired-[employer] Quality of life: excellent    Pain  Relieving factors: change in position, relaxation and support  Aggravating factors: ambulation (Reluctant to put weight on the foot)  Progression: improved    Social Support  Lives with: spouse    Treatments  Previous treatment: immobilization and medication (Cast; was taking Tyelnol and Rx pain medication)  Patient Goals  Patient goals for therapy: decreased edema, decreased pain, increased motion, increased strength and return to sport/leisure activities  Patient goal: Be able to walk dog without difficulty.  Be able to walk to the [facility].           *LEFS:  28/80  Objective          Active Range of Motion   Left Ankle/Foot   Plantar flexion: 65 degrees   Inversion: 0 degrees   Eversion: 10 degrees     Right Ankle/Foot   Dorsiflexion (ke): 5 degrees   Plantar flexion: 55 degrees   Inversion: 25 degrees   Eversion: 20 degrees     Additional Active Range of Motion Details  *L DF:  8-0 deg      Swelling   Left Ankle/Foot   Figure 8: 62 cm    Right Ankle/Foot   Figure 8: 57 cm          Assessment & Plan     Assessment  Impairments: abnormal gait, abnormal or restricted ROM, activity intolerance, lacks appropriate  home exercise program, pain with function and weight-bearing intolerance  Functional Limitations: walking  Assessment details: Mr. Goode is a 66 year old male that presents to physical therapy s/p L ankle ORIF on 5/19/22.  PMH was covered during interview.  Has moderate edema about the ankle region.  No signs of erythema or ecchymosis.  Non-tender to palpation along posterior lower leg vascular structures.  Ambulates with a rolling walker in non-weightbearing.  Has notable hypomobility in all planes of the ankle.  Therefore, will focus care on restoring mobility and improving loading tolerance of the ankle complex.  Prognosis: good    Goals  Plan Goals: STGs:  1.)  LEFS improved x 1 MCID in 6 weeks.  2.)  Have full AROM of the ankle in all planes and equal bilaterally in 8 weeks.  LTGs:  1.)  Ambulate without an assistive device or walking boot without antalgia in 12 weeks.  2.)  Return to all premorbid activities without functional limitations or pain in 16 weeks.    Plan  Therapy options: will be seen for skilled therapy services  Planned modality interventions: cryotherapy and thermotherapy (hydrocollator packs)  Planned therapy interventions: therapeutic activities, stretching, strengthening, manual therapy, balance/weight-bearing training, functional ROM exercises, gait training and home exercise program  Frequency: 2x week  Duration in visits: 20  Duration in weeks: 10  Treatment plan discussed with: patient        Manual Therapy:    11     mins  47116;  Therapeutic Exercise:    14     mins  19636;     Neuromuscular Luis Antonio:        mins  99567;    Therapeutic Activity:          mins  94143;     Gait Training:           mins  72729;     Ultrasound:          mins  50923;    Electrical Stimulation:         mins  71280 ( );  Dry Needling         mins self-pay    Timed Treatment:   25   mins   Total Treatment:     50   mins    PT SIGNATURE: Wicho Lange, ROLF   DATE TREATMENT INITIATED:  6/23/2022    Initial Certification  Certification Period: 9/21/2022  I certify that the therapy services are furnished while this patient is under my care.  The services outlined above are required by this patient, and will be reviewed every 90 days.     PHYSICIAN: Jovi Horn PA-C  NPI: [id number]                                      DATE:    Please sign and return via fax to [phone].. Thank you, [facility].

## 2022-06-27 ENCOUNTER — OFFICE VISIT (OUTPATIENT)
Dept: UROLOGY | Facility: CLINIC | Age: 67
End: 2022-06-27

## 2022-06-27 ENCOUNTER — LAB (OUTPATIENT)
Dept: LAB | Facility: HOSPITAL | Age: 67
End: 2022-06-27

## 2022-06-27 ENCOUNTER — TREATMENT (OUTPATIENT)
Dept: PHYSICAL THERAPY | Facility: CLINIC | Age: 67
End: 2022-06-27

## 2022-06-27 VITALS
OXYGEN SATURATION: 97 % | BODY MASS INDEX: 25.73 KG/M2 | HEART RATE: 81 BPM | SYSTOLIC BLOOD PRESSURE: 142 MMHG | HEIGHT: 72 IN | WEIGHT: 190 LBS | DIASTOLIC BLOOD PRESSURE: 74 MMHG

## 2022-06-27 DIAGNOSIS — N13.8 BPH WITH OBSTRUCTION/LOWER URINARY TRACT SYMPTOMS: ICD-10-CM

## 2022-06-27 DIAGNOSIS — N13.8 BPH WITH OBSTRUCTION/LOWER URINARY TRACT SYMPTOMS: Primary | ICD-10-CM

## 2022-06-27 DIAGNOSIS — N40.1 BPH WITH OBSTRUCTION/LOWER URINARY TRACT SYMPTOMS: Primary | ICD-10-CM

## 2022-06-27 DIAGNOSIS — M25.572 CHRONIC PAIN OF LEFT ANKLE: Primary | ICD-10-CM

## 2022-06-27 DIAGNOSIS — G89.29 CHRONIC PAIN OF LEFT ANKLE: Primary | ICD-10-CM

## 2022-06-27 DIAGNOSIS — N40.1 BPH WITH OBSTRUCTION/LOWER URINARY TRACT SYMPTOMS: ICD-10-CM

## 2022-06-27 LAB
BILIRUB BLD-MCNC: NEGATIVE MG/DL
CLARITY, POC: CLEAR
COLOR UR: YELLOW
EXPIRATION DATE: NORMAL
GLUCOSE UR STRIP-MCNC: NEGATIVE MG/DL
KETONES UR QL: NEGATIVE
LEUKOCYTE EST, POC: NEGATIVE
Lab: NORMAL
NITRITE UR-MCNC: NEGATIVE MG/ML
PH UR: 6 [PH] (ref 5–8)
PROT UR STRIP-MCNC: NEGATIVE MG/DL
PSA SERPL-MCNC: 3.53 NG/ML (ref 0–4)
RBC # UR STRIP: NEGATIVE /UL
SP GR UR: 1.01 (ref 1–1.03)
UROBILINOGEN UR QL: NORMAL

## 2022-06-27 PROCEDURE — 81003 URINALYSIS AUTO W/O SCOPE: CPT | Performed by: STUDENT IN AN ORGANIZED HEALTH CARE EDUCATION/TRAINING PROGRAM

## 2022-06-27 PROCEDURE — 36415 COLL VENOUS BLD VENIPUNCTURE: CPT

## 2022-06-27 PROCEDURE — 84153 ASSAY OF PSA TOTAL: CPT

## 2022-06-27 PROCEDURE — 97110 THERAPEUTIC EXERCISES: CPT | Performed by: PHYSICAL THERAPIST

## 2022-06-27 PROCEDURE — 97530 THERAPEUTIC ACTIVITIES: CPT | Performed by: PHYSICAL THERAPIST

## 2022-06-27 PROCEDURE — 99214 OFFICE O/P EST MOD 30 MIN: CPT | Performed by: STUDENT IN AN ORGANIZED HEALTH CARE EDUCATION/TRAINING PROGRAM

## 2022-06-27 PROCEDURE — 97140 MANUAL THERAPY 1/> REGIONS: CPT | Performed by: PHYSICAL THERAPIST

## 2022-06-27 RX ORDER — OXYBUTYNIN CHLORIDE 10 MG/1
10 TABLET, EXTENDED RELEASE ORAL DAILY
Qty: 90 TABLET | Refills: 3 | Status: SHIPPED | OUTPATIENT
Start: 2022-06-27 | End: 2022-09-25

## 2022-06-27 RX ORDER — TAMSULOSIN HYDROCHLORIDE 0.4 MG/1
1 CAPSULE ORAL DAILY
Qty: 90 CAPSULE | Refills: 3 | Status: SHIPPED | OUTPATIENT
Start: 2022-06-27 | End: 2022-09-25

## 2022-06-27 NOTE — PROGRESS NOTES
Follow Up Office Visit      Patient Name: Anderson Goode  : 1955   MRN: [id number]     Chief Complaint:    Chief Complaint   Patient presents with   • Urinary Urgency   • Benign Prostatic Hypertrophy     With obstruction/LUTS       Referring Provider: No ref. provider found    History of Present Illness: Anderson Goode is a 66 y.o. male who presents today for follow up of  BPH with LUTS, including persistent frequency and urgency. Has developed urgency related incontinence recently, as a result of difficult mobilization as he recently broke his fibula and ankle related to a recent fall, slipping in the mud.     He reports some ongoing urinary symptoms including frequency, urgency and 2x nocturia.  At the end of his stream he states his urine stream becomes very weak.    Despite previous counseling, patient drinks a Mtn Dew in morning, Pepsi with lunch.  He drinks Gatorade 0 otherwise.    PSA trend as below, he is to repeat a PSA today.    Lab Results   Component Value Date    PSA 3.450 2021    PSA 2.230 2020    PSA 0.970 2017    PSA 1.100 2016    PSA 0.90 2015     Patient's prostate on CT from 2018 roughly 37 to 47 cc by blood volume, personally calculated.    Patient has been managed with Vesicare 10 mg daily and doxazosin 4 mg daily for his symptoms but continues to report worsening control.  His IPSS score 13, quality-of-life score is unhappy.      Subjective      Review of System: Review of Systems   Constitutional: Negative for chills, fatigue, fever and unexpected weight change.   HENT: Negative for sore throat.    Eyes: Negative for visual disturbance.   Respiratory: Negative for cough, chest tightness and shortness of breath.    Cardiovascular: Negative for chest pain and leg swelling.   Gastrointestinal: Negative for blood in stool, constipation, diarrhea, nausea, rectal pain and vomiting.   Genitourinary: Positive for frequency. Negative for decreased  urine volume, difficulty urinating, dysuria, enuresis, flank pain, genital sores, hematuria and urgency.   Musculoskeletal: Negative for back pain and joint swelling.   Skin: Negative for rash and wound.   Neurological: Negative for seizures, speech difficulty, weakness and headaches.   Psychiatric/Behavioral: Negative for confusion, sleep disturbance and suicidal ideas. The patient is not nervous/anxious.       I have reviewed the ROS documented by my clinical staff, I have updated appropriately and I agree. Danilo Doe MD    I have reviewed and the following portions of the patient's history were updated as appropriate: past family history, past medical history, past social history, past surgical history and problem list.    Medications:     Current Outpatient Medications:   •  Ascorbic Acid (VITAMIN C) 500 MG capsule, Take  by mouth daily., Disp: , Rfl:   •  aspirin 325 MG tablet, Take 325 mg by mouth Every Night., Disp: , Rfl:   •  Aspirin-Caffeine 500-32.5 MG tablet, Take  by mouth Every Morning., Disp: , Rfl:   •  atorvastatin (LIPITOR) 80 MG tablet, TAKE ONE TABLET BY MOUTH EVERY NIGHT AT BEDTIME, Disp: 90 tablet, Rfl: 0  •  Cetirizine HCl (ZyrTEC Allergy) 10 MG capsule, Take 10 mg by mouth Daily., Disp: 90 capsule, Rfl: 3  •  Cholecalciferol (VITAMIN D) 1000 UNITS tablet, Take 1 capsule by mouth., Disp: , Rfl:   •  fluticasone (Flonase Sensimist) 27.5 MCG/SPRAY nasal spray, 2 sprays into the nostril(s) as directed by provider Daily., Disp: 9.1 mL, Rfl: 12  •  Glucosamine HCl 1500 MG tablet, Take 1,500 mg by mouth daily., Disp: 30 each, Rfl:   •  hypromellose (NATURES TEARS) 0.4 % solution, Apply  to eye., Disp: , Rfl:   •  ipratropium (ATROVENT) 0.03 % nasal spray, 1 spray into the nostril(s) as directed by provider As Needed., Disp: , Rfl:   •  Multiple Vitamins-Minerals (CENTRUM ADULTS PO), Take  by mouth daily., Disp: , Rfl:   •  oxybutynin XL (Ditropan XL) 10 MG 24 hr tablet, Take 1 tablet by  mouth Daily for 360 days., Disp: 90 tablet, Rfl: 3  •  tamsulosin (FLOMAX) 0.4 MG capsule 24 hr capsule, Take 1 capsule by mouth Daily., Disp: 90 capsule, Rfl: 3    Allergies:   Allergies   Allergen Reactions   • Cephalexin Rash   • Omeprazole GI Intolerance       IPSS Questionnaire (AUA-7):  Over the past month…    1)  Incomplete Emptying:       How often have you had a sensation of not emptying you had the sensation of not emptying your bladder completely after you finished urinating?  0 - Not at all   2)  Frequency:       How often have you had the urinate again less than two hours after you finished urinating?  4 - More than half the time   3)  Intermittency:       How often have you found you stopped and started again several times when you urinated?   3 - About half the time   4) Urgency:      How often have you found it difficult to postpone urination?  3 - About half the time   5) Weak Stream:      How often have you had a weak urinary stream?  1 - Less than 1 time in 5   6) Straining:       How often have you had to push or strain to begin urination?  0 - Not at all   7) Nocturia:      How many times did you most typically get up to urinate from the time you went to bed at night until the time you got up in the morning?  2 - 2 times   Total Score:  13   The International Prostate Symptom Score (IPSS) is used to screen, diagnose, track symptoms of benign prostatic hyperplasia (BPH).   0-7 (Mild Symptoms) 8-19 (Moderate) 20-35 (Severe)   Quality of Life (QoL):  If you were to spend the rest of your life with your urinary condition just the way it is now, how would you feel about that? 5-Unhappy   Urine Leakage (Incontinence) 0-No Leakage     Sexual Health Inventory for Men (ANGI)   Over the past 6 months:     1. How do you rate your confidence that you could get and keep an erection?  0 - No sexual activity    2. When you had erections with sexual  stimulation, how often were your erections hard enough for  "penetration (entering your partner)?  0 - No Sexual Activity    3. During sexual intercourse, how often were you able to maintain your erection after you had penetrated (entered) your partner?  0 - Did not attempt intercourse   4. During sexual intercourse, how difficult was it to maintain your erection to completion of intercourse?  0 - Did not attempt intercourse   5. When you attempted sexual intercourse, how often was it satisfactory for you?  0 - Did not attempt intercourse    Total Score: 0   The Sexual Health Inventory for Men further classifies ED severity with the following breakpoints:   1-7 (Severe ED) 8-11 (Moderate ED) 12-16 (Mild to Moderate ED) 17-21 (Mild ED)          Objective     Physical Exam:   Vital Signs:   Vitals:    06/27/22 1320   BP: 142/74   Pulse: 81   SpO2: 97%   Weight: 86.2 kg (190 lb)   Height: 182.9 cm (72.01\")     Body mass index is 25.76 kg/m².     Physical Exam  Vitals and nursing note reviewed.   Constitutional:       Appearance: Normal appearance.   HENT:      Head: Normocephalic and atraumatic.      Nose: Nose normal.      Mouth/Throat:      Mouth: Mucous membranes are moist.      Pharynx: Oropharynx is clear.   Eyes:      Extraocular Movements: Extraocular movements intact.      Conjunctiva/sclera: Conjunctivae normal.      Pupils: Pupils are equal, round, and reactive to light.   Cardiovascular:      Rate and Rhythm: Normal rate and regular rhythm.   Pulmonary:      Effort: Pulmonary effort is normal. No respiratory distress.   Abdominal:      Palpations: Abdomen is soft.      Tenderness: There is no abdominal tenderness. There is no right CVA tenderness or left CVA tenderness.   Genitourinary:     Comments: Deferred  Musculoskeletal:         General: Normal range of motion.      Cervical back: Normal range of motion and neck supple.      Comments: LLE boot in place due to recent fibula fracture    Skin:     General: Skin is warm and dry.      Findings: No lesion or rash. "   Neurological:      General: No focal deficit present.      Mental Status: He is alert and oriented to person, place, and time. Mental status is at baseline.   Psychiatric:         Mood and Affect: Mood normal.         Behavior: Behavior normal.         Labs:   Brief Urine Lab Results  (Last result in the past 365 days)      Color   Clarity   Blood   Leuk Est   Nitrite   Protein   CREAT   Urine HCG        03/17/22 1052 Yellow   Clear   Negative   Small (1+)   Negative   Negative                      Lab Results   Component Value Date    GLUCOSE 96 03/17/2022    CALCIUM 9.5 03/17/2022     03/17/2022    K 4.8 03/17/2022    CO2 27.9 03/17/2022     03/17/2022    BUN 16 03/17/2022    CREATININE 0.86 03/17/2022    EGFRIFNONA 98 02/17/2021    BCR 18.6 03/17/2022    ANIONGAP 8.1 03/17/2022       Lab Results   Component Value Date    WBC 7.30 03/17/2022    HGB 13.9 03/17/2022    HCT 41.7 03/17/2022    MCV 96.8 03/17/2022     03/17/2022       Images:   No Images in the past 120 days found..    Measures:   Tobacco:   Anderson Goode  reports that he has never smoked. He has never used smokeless tobacco..            Assessment / Plan      Assessment/Plan:   66 y.o. male who presented today for follow up of BPH with lower urinary tract symptoms.  Currently managing with 10 mg Vesicare daily, doxazosin 4 mg daily.  I discussed at this time given his persistent symptoms including urgency, frequency, nocturia and moderately reduced stream options would include medication changes, proceeding with flexible cystoscopy to evaluate for prostatic obstruction which may warrant surgical intervention or continue his medical therapy as it is currently with continued attempts to reduce his caffeine.  Patient is interested medication changes.  We will switch him from the Vesicare to oxybutynin 10 mg extended release and from the Cardura to tamsulosin 0.4 mg daily.  We discussed that tamsulosin is more effective  selective alpha-blocker for the prostate.  We discussed side effects, risk benefits and alternatives.  If no significant improvement I asked the patient give me a call and we can set him up for a flexible cystoscopy in the future.  Patient needs to complete a repeat PSA today.  Has not had a PSA more than a year.  We again discussed PSA and prostate cancer screening per the AUA guidelines, shared decision-making model.      Diagnoses and all orders for this visit:    1. BPH with obstruction/lower urinary tract symptoms (Primary)  -     PSA Diagnostic; Future  -     oxybutynin XL (Ditropan XL) 10 MG 24 hr tablet; Take 1 tablet by mouth Daily for 360 days.  Dispense: 90 tablet; Refill: 3  -     tamsulosin (FLOMAX) 0.4 MG capsule 24 hr capsule; Take 1 capsule by mouth Daily.  Dispense: 90 capsule; Refill: 3           Follow Up:   Return in about 3 months (around 9/27/2022).    I spent approximately 30 minutes providing clinical care for this patient; including review of patient's chart and provider documentation, face to face time spent with patient in examination room (obtaining history, performing physical exam, discussing diagnosis and management options), placing orders, and completing patient documentation.     Danilo Doe MD  [facility] [place]

## 2022-06-28 ENCOUNTER — TELEPHONE (OUTPATIENT)
Dept: UROLOGY | Facility: CLINIC | Age: 67
End: 2022-06-28

## 2022-06-28 NOTE — TELEPHONE ENCOUNTER
----- Message from Danilo Doe MD sent at 6/28/2022  9:03 AM EDT -----  Please let Anderson know his PSA level is stable/normal, will recheck in 1 year.

## 2022-06-28 NOTE — PROGRESS NOTES
Physical Therapy Daily Progress Note    Patient: Anderson Goode   : 1955  Diagnosis/ICD-10 Code:  Chronic pain of left ankle [M25.572, G89.29]  Referring practitioner: Jovi Horn PA-C  Date of Initial Visit: Type: THERAPY  Noted: 2022  Today's Date: 2022  Patient seen for 2 sessions         Anderson Goode reports that his ankle feels better since last visit.  Has been able to place full body weight through the ankle without pain.  Feels like the swelling in the ankle has improved as well.    Subjective     Objective   See Exercise, Manual, and Modality Logs for complete treatment.       Assessment/Plan  Focused visit on improving ankle mobility and strength in unloaded positioning.  Needs focused care on improving ankle mobility in the frontal plane.           Manual Therapy:    15     mins  46007;  Therapeutic Exercise:    15     mins  40611;     Neuromuscular Luis Antonio:        mins  02082;    Therapeutic Activity:     14     mins  94742;     Gait Training:           mins  06111;     Ultrasound:          mins  93086;    Electrical Stimulation:         mins  43924 ( );  Dry Needling          mins self-pay    Timed Treatment:  44    mins   Total Treatment:     44   mins    Wicho Lange PT  Physical Therapist

## 2022-06-29 ENCOUNTER — TREATMENT (OUTPATIENT)
Dept: PHYSICAL THERAPY | Facility: CLINIC | Age: 67
End: 2022-06-29

## 2022-06-29 DIAGNOSIS — M25.572 CHRONIC PAIN OF LEFT ANKLE: Primary | ICD-10-CM

## 2022-06-29 DIAGNOSIS — G89.29 CHRONIC PAIN OF LEFT ANKLE: Primary | ICD-10-CM

## 2022-06-29 PROCEDURE — 97110 THERAPEUTIC EXERCISES: CPT | Performed by: PHYSICAL THERAPIST

## 2022-06-29 PROCEDURE — 97140 MANUAL THERAPY 1/> REGIONS: CPT | Performed by: PHYSICAL THERAPIST

## 2022-06-29 PROCEDURE — 97530 THERAPEUTIC ACTIVITIES: CPT | Performed by: PHYSICAL THERAPIST

## 2022-06-29 NOTE — PROGRESS NOTES
Physical Therapy Daily Progress Note    Patient: Anderson Goode   : 1955  Diagnosis/ICD-10 Code:  Chronic pain of left ankle [M25.572, G89.29]  Referring practitioner: Jovi Horn PA-C  Date of Initial Visit: Type: THERAPY  Noted: 2022  Today's Date: 2022  Patient seen for 3 sessions         Anderson Goode reports no pain when ambulating on his left foot.  Has been noticing improvements in swelling.    Subjective     Objective   See Exercise, Manual, and Modality Logs for complete treatment.       Assessment/Plan  Focused visit on improving ankle mobility, dynamic control and loading tolerance.           Manual Therapy:    15     mins  30416;  Therapeutic Exercise:    15     mins  60304;     Neuromuscular Luis Antonio:        mins  87039;    Therapeutic Activity:     8     mins  08993;     Gait Training:           mins  92205;     Ultrasound:          mins  34284;    Electrical Stimulation:         mins  78741 ( );  Dry Needling          mins self-pay    Timed Treatment:   38  mins   Total Treatment:     38   mins    Wicho Lange, PT  Physical Therapist

## 2022-07-05 ENCOUNTER — TREATMENT (OUTPATIENT)
Dept: PHYSICAL THERAPY | Facility: CLINIC | Age: 67
End: 2022-07-05

## 2022-07-05 DIAGNOSIS — M25.572 CHRONIC PAIN OF LEFT ANKLE: Primary | ICD-10-CM

## 2022-07-05 DIAGNOSIS — G89.29 CHRONIC PAIN OF LEFT ANKLE: Primary | ICD-10-CM

## 2022-07-05 PROCEDURE — 97530 THERAPEUTIC ACTIVITIES: CPT | Performed by: PHYSICAL THERAPIST

## 2022-07-05 PROCEDURE — 97140 MANUAL THERAPY 1/> REGIONS: CPT | Performed by: PHYSICAL THERAPIST

## 2022-07-05 PROCEDURE — 97110 THERAPEUTIC EXERCISES: CPT | Performed by: PHYSICAL THERAPIST

## 2022-07-05 PROCEDURE — 97112 NEUROMUSCULAR REEDUCATION: CPT | Performed by: PHYSICAL THERAPIST

## 2022-07-05 NOTE — PROGRESS NOTES
Physical Therapy Daily Progress Note    Patient: Anderson Goode   : 1955  Diagnosis/ICD-10 Code:  Chronic pain of left ankle [M25.572, G89.29]  Referring practitioner: Jovi Horn PA-C  Date of Initial Visit: Type: THERAPY  Noted: 2022  Today's Date: 2022  Patient seen for 4 sessions         Anderson oGode reports that his ankle is more sore today from walking more yesterday using a cane and boot.      Subjective     Objective   See Exercise, Manual, and Modality Logs for complete treatment.     *Figure 8:  60 cm    Assessment/Plan  Continued emphasis on improving ankle mobility in P/AA/AROM fashion.             Manual Therapy:    15     mins  71293;  Therapeutic Exercise:    15     mins  37926;     Neuromuscular Luis Antonio:    9    mins  22951;    Therapeutic Activity:     14     mins  70630;     Gait Training:           mins  03796;     Ultrasound:          mins  24219;    Electrical Stimulation:         mins  31603 ( );  Dry Needling          mins self-pay    Timed Treatment:    53  mins   Total Treatment:     53   mins    Wicho Lange, PT  Physical Therapist

## 2022-07-07 ENCOUNTER — TREATMENT (OUTPATIENT)
Dept: PHYSICAL THERAPY | Facility: CLINIC | Age: 67
End: 2022-07-07

## 2022-07-07 DIAGNOSIS — G89.29 CHRONIC PAIN OF LEFT ANKLE: Primary | ICD-10-CM

## 2022-07-07 DIAGNOSIS — M25.572 CHRONIC PAIN OF LEFT ANKLE: Primary | ICD-10-CM

## 2022-07-07 PROCEDURE — 97110 THERAPEUTIC EXERCISES: CPT | Performed by: PHYSICAL THERAPIST

## 2022-07-07 PROCEDURE — 97140 MANUAL THERAPY 1/> REGIONS: CPT | Performed by: PHYSICAL THERAPIST

## 2022-07-07 PROCEDURE — 97530 THERAPEUTIC ACTIVITIES: CPT | Performed by: PHYSICAL THERAPIST

## 2022-07-07 NOTE — PROGRESS NOTES
Physical Therapy Daily Progress Note    Patient: Anderson Goode   : 1955  Diagnosis/ICD-10 Code:  Chronic pain of left ankle [M25.572, G89.29]  Referring practitioner: Jovi Horn PA-C  Date of Initial Visit: Type: THERAPY  Noted: 2022  Today's Date: 2022  Patient seen for 5 sessions         Anderson Goode reports that his ankle is still quite sore since last visit.  That is even resting his leg for most of yesterday.        Subjective     Objective   See Exercise, Manual, and Modality Logs for complete treatment.       Assessment/Plan  Continued emphasis on improving ankle mobility in all planes via non-thrust manipulative therapy.  Combined with exercises to improve ankle mobility in all planes in modified loading positions.           Manual Therapy:    15     mins  46898;  Therapeutic Exercise:    18     mins  75250;     Neuromuscular Luis Antonio:        mins  70928;    Therapeutic Activity:     10     mins  94563;     Gait Training:           mins  40151;     Ultrasound:          mins  74018;    Electrical Stimulation:         mins  66406 ( );  Dry Needling          mins self-pay    Timed Treatment:   43   mins   Total Treatment:     53   mins    Wicho Lange PT  Physical Therapist

## 2022-07-11 ENCOUNTER — TREATMENT (OUTPATIENT)
Dept: PHYSICAL THERAPY | Facility: CLINIC | Age: 67
End: 2022-07-11

## 2022-07-11 DIAGNOSIS — G89.29 CHRONIC PAIN OF LEFT ANKLE: Primary | ICD-10-CM

## 2022-07-11 DIAGNOSIS — M25.572 CHRONIC PAIN OF LEFT ANKLE: Primary | ICD-10-CM

## 2022-07-11 PROCEDURE — 97140 MANUAL THERAPY 1/> REGIONS: CPT | Performed by: PHYSICAL THERAPIST

## 2022-07-11 PROCEDURE — 97530 THERAPEUTIC ACTIVITIES: CPT | Performed by: PHYSICAL THERAPIST

## 2022-07-11 PROCEDURE — 97110 THERAPEUTIC EXERCISES: CPT | Performed by: PHYSICAL THERAPIST

## 2022-07-11 NOTE — PROGRESS NOTES
Physical Therapy Daily Progress Note    Patient: Anderson Goode   : 1955  Diagnosis/ICD-10 Code:  Chronic pain of left ankle [M25.572, G89.29]  Referring practitioner: Jovi Horn PA-C  Date of Initial Visit: Type: THERAPY  Noted: 2022  Today's Date: 2022  Patient seen for 6 sessions         Anderson Goode reports that his ankle is less sore since last visit.  Using walker around his home to keep some weight off of it.        Subjective     Objective   See Exercise, Manual, and Modality Logs for complete treatment.       Assessment/Plan  Continued emphasis on improving ankle mobility, specifically inversion/eversion.             Manual Therapy:    15     mins  29875;  Therapeutic Exercise:    14     mins  37129;     Neuromuscular Luis Antonio:        mins  07042;    Therapeutic Activity:     10     mins  90774;     Gait Training:           mins  20094;     Ultrasound:          mins  29544;    Electrical Stimulation:         mins  80981 ( );  Dry Needling          mins self-pay    Timed Treatment:   39   mins   Total Treatment:     39   mins    Wicho Lange PT  Physical Therapist

## 2022-07-14 ENCOUNTER — TREATMENT (OUTPATIENT)
Dept: PHYSICAL THERAPY | Facility: CLINIC | Age: 67
End: 2022-07-14

## 2022-07-14 DIAGNOSIS — M25.572 CHRONIC PAIN OF LEFT ANKLE: Primary | ICD-10-CM

## 2022-07-14 DIAGNOSIS — G89.29 CHRONIC PAIN OF LEFT ANKLE: Primary | ICD-10-CM

## 2022-07-14 PROCEDURE — 97112 NEUROMUSCULAR REEDUCATION: CPT | Performed by: PHYSICAL THERAPIST

## 2022-07-14 PROCEDURE — 97530 THERAPEUTIC ACTIVITIES: CPT | Performed by: PHYSICAL THERAPIST

## 2022-07-14 PROCEDURE — 97140 MANUAL THERAPY 1/> REGIONS: CPT | Performed by: PHYSICAL THERAPIST

## 2022-07-14 PROCEDURE — 97110 THERAPEUTIC EXERCISES: CPT | Performed by: PHYSICAL THERAPIST

## 2022-07-14 NOTE — PROGRESS NOTES
Physical Therapy Daily Progress Note    Patient: Anderson Goode   : 1955  Diagnosis/ICD-10 Code:  Chronic pain of left ankle [M25.572, G89.29]  Referring practitioner: Jovi Horn PA-C  Date of Initial Visit: Type: THERAPY  Noted: 2022  Today's Date: 2022  Patient seen for 7 sessions         Anderson Goode reports that his ankle feels less sore than last visit.  Can bear all of his body weight over the left leg without much pain.  Notices the pain has a latent onset after walking and/or standing for long periods of time.    Subjective     Objective   See Exercise, Manual, and Modality Logs for complete treatment.       Assessment/Plan  Continued emphasis on improving ankle mobility, dynamic control and strength.             Manual Therapy:    15     mins  15889;  Therapeutic Exercise:    15     mins  44198;     Neuromuscular Luis Antonio:    10    mins  64280;    Therapeutic Activity:     14     mins  96560;     Gait Training:           mins  90702;     Ultrasound:          mins  50831;    Electrical Stimulation:         mins  28725 ( );  Dry Needling          mins self-pay    Timed Treatment:   54   mins   Total Treatment:     54   mins    Wicho Lange PT  Physical Therapist

## 2022-07-18 ENCOUNTER — TREATMENT (OUTPATIENT)
Dept: PHYSICAL THERAPY | Facility: CLINIC | Age: 67
End: 2022-07-18

## 2022-07-18 DIAGNOSIS — G89.29 CHRONIC PAIN OF LEFT ANKLE: Primary | ICD-10-CM

## 2022-07-18 DIAGNOSIS — M25.572 CHRONIC PAIN OF LEFT ANKLE: Primary | ICD-10-CM

## 2022-07-18 PROCEDURE — 97140 MANUAL THERAPY 1/> REGIONS: CPT | Performed by: PHYSICAL THERAPIST

## 2022-07-18 PROCEDURE — 97110 THERAPEUTIC EXERCISES: CPT | Performed by: PHYSICAL THERAPIST

## 2022-07-18 PROCEDURE — 97530 THERAPEUTIC ACTIVITIES: CPT | Performed by: PHYSICAL THERAPIST

## 2022-07-18 NOTE — PROGRESS NOTES
Physical Therapy Daily Progress Note    Patient: Anderson Goode   : 1955  Diagnosis/ICD-10 Code:  No primary diagnosis found.  Referring practitioner: Jovi Horn PA-C  Date of Initial Visit: No linked episodes  Today's Date: 2022  Patient seen for Visit count could not be calculated. Make sure you are using a visit which is associated with an episode. sessions         Anderson Goode reports feeling better overall.  He has had more ability to put more weight through his left leg.  States that he does spend late evenings to early mornings without using the boot.  States that he has less aching like pain after being on his foot/ankle for long periods of time.  Has also noticed his swelling is improving.        Subjective     Objective   See Exercise, Manual, and Modality Logs for complete treatment.     *LEFS:  43/80  *Ankle figure 8:  62 cm  *Ankle DF/PF/Inv/Ev:  0-5 deg/35 deg/10 deg/10 deg    Assessment/Plan  Mr. Goode has attended physical therapy for a total of 8 visits s/p L ankle ORIF on 2022.  Has made slight improvements in edema.  Ankle AROM has improved in all directions, but inversion is still the most limited motion.  Continuing to focus heavily on mobility.  Started some low level weight bearing exercises to improve ankle mobility in closed kinetic chain.           Manual Therapy:    15     mins  21672;  Therapeutic Exercise:    15     mins  35894;     Neuromuscular Luis Antonio:        mins  51173;    Therapeutic Activity:     9     mins  01948;     Gait Training:           mins  25324;     Ultrasound:          mins  88004;    Electrical Stimulation:        mins  11177 ( );  Dry Needling          mins self-pay    Timed Treatment:   39   mins   Total Treatment:     44   mins    Wicho Lange, PT  Physical Therapist

## 2022-07-22 RX ORDER — ATORVASTATIN CALCIUM 80 MG/1
TABLET, FILM COATED ORAL
Qty: 90 TABLET | Refills: 0 | Status: SHIPPED | OUTPATIENT
Start: 2022-07-22 | End: 2022-10-28

## 2022-07-25 ENCOUNTER — TREATMENT (OUTPATIENT)
Dept: PHYSICAL THERAPY | Facility: CLINIC | Age: 67
End: 2022-07-25

## 2022-07-25 DIAGNOSIS — M25.572 CHRONIC PAIN OF LEFT ANKLE: Primary | ICD-10-CM

## 2022-07-25 DIAGNOSIS — G89.29 CHRONIC PAIN OF LEFT ANKLE: Primary | ICD-10-CM

## 2022-07-25 PROCEDURE — 97110 THERAPEUTIC EXERCISES: CPT | Performed by: PHYSICAL THERAPIST

## 2022-07-25 PROCEDURE — 97140 MANUAL THERAPY 1/> REGIONS: CPT | Performed by: PHYSICAL THERAPIST

## 2022-07-25 PROCEDURE — 97530 THERAPEUTIC ACTIVITIES: CPT | Performed by: PHYSICAL THERAPIST

## 2022-07-25 NOTE — PROGRESS NOTES
Physical Therapy Daily Progress Note    Patient: Anderson Goode   : 1955  Diagnosis/ICD-10 Code:  Chronic pain of left ankle [M25.572, G89.29]  Referring practitioner: Jovi Horn PA-C  Date of Initial Visit: Type: THERAPY  Noted: 2022  Today's Date: 2022  Patient seen for 9 sessions         Anderson Goode reports feeling better his since last visit.  Has had less ankle soreness and stiffness.  Although, still using the walking boot at this time.    Subjective     Objective   See Exercise, Manual, and Modality Logs for complete treatment.     *Figure 8:  59.5 cm  *Ankle AROM DF/PF/Ev/Inv:  5 deg/45 deg/20 deg/5 deg  *Ankle MMT PF/DF/Inv/Ev:  0/5; 4+/5; 2+/5; 4+/5    Assessment/Plan  Mr. Goode has attended physical therapy for a total of  8 visits and1/2 approved visits today.  Has notably less edema around the ankle complex.  Combined with improved ankle mobility in all planes.  Although, still needs more inversion and plantar flexion mobility.  Loading tolerance over the L LE has improved using a boot without an assistive device.  Focused visit on improving subtalar joint mobility and dynamic control of the ankle in all planes in unloaded positioning.  Will begin weight bearing exercises at next visit.             Manual Therapy:    10     mins  76113;  Therapeutic Exercise:    18     mins  76714;     Neuromuscular Luis Antonio:        mins  47829;    Therapeutic Activity:     12     mins  42989;     Gait Training:           mins  93455;     Ultrasound:          mins  31539;    Electrical Stimulation:         mins  32198 ( );  Dry Needling          mins self-pay    Timed Treatment:   40   mins   Total Treatment:     45   mins    Wicho Lange, ROLF  Physical Therapist

## 2022-08-03 ENCOUNTER — TREATMENT (OUTPATIENT)
Dept: PHYSICAL THERAPY | Facility: CLINIC | Age: 67
End: 2022-08-03

## 2022-08-03 DIAGNOSIS — G89.29 CHRONIC PAIN OF LEFT ANKLE: Primary | ICD-10-CM

## 2022-08-03 DIAGNOSIS — M25.572 CHRONIC PAIN OF LEFT ANKLE: Primary | ICD-10-CM

## 2022-08-03 PROCEDURE — 97140 MANUAL THERAPY 1/> REGIONS: CPT | Performed by: PHYSICAL THERAPIST

## 2022-08-03 PROCEDURE — 97530 THERAPEUTIC ACTIVITIES: CPT | Performed by: PHYSICAL THERAPIST

## 2022-08-03 PROCEDURE — 97110 THERAPEUTIC EXERCISES: CPT | Performed by: PHYSICAL THERAPIST

## 2022-08-03 NOTE — PROGRESS NOTES
Physical Therapy Daily Progress Note    Patient: Anderson Goode   : 1955  Diagnosis/ICD-10 Code:  Chronic pain of left ankle [M25.572, G89.29]  Referring practitioner: Jovi Horn PA-C  Date of Initial Visit: Type: THERAPY  Noted: 2022  Today's Date: 8/3/2022  Patient seen for 10 sessions         Anderson Goode reports that his ankle is no longer painful.  Has managed swelling well with using a compression sock.  Feels like he is ready to walk without boot now.  Ankle just feels stiff at this time.  Although, he has had more fear of walking without his boot in terms of re-injuring the ankle.        Subjective     Objective   See Exercise, Manual, and Modality Logs for complete treatment.     *LEFS:  54/80  *Ankle AROM DF/PF/Inv/Ev:  10- deg / 55 deg / 18 deg / 18 deg  *Ankle MMT PF/DF/Inv/Ev:  1/5; 4+/5; 2+/5; 4+/5  *Figure 8 (cm):  R-> ; L->59 cm    Assessment/Plan  Mr. Goode has attended physical therapy for a total of 10 visits s/p L ankle ORIF on 2022.  Has improved edema about the ankle complex.  Ankle mobility has improved in all planes.  Although, is still quite stiff into plantar flexion and inversion.  Strength is slowly improving.  I expect this to improve more quickly now that Mr. Goode will be weaning from the walking boot.  Also, this is a likely source of his ankle stiffness.  Will begin weight-bearing exercises at next visit to improve loading tolerance and reduce ankle stiffness.  Mr. Goode will need another 4 weeks of physical therapy at 2 times per week to improve his functional status.  If he does not have continued physical therapy services, I am afraid he will end up with permanent functional deficits.      Goals  Plan Goals: STGs:  1.)  LEFS improved x 1 MCID in 6 weeks->MET  2.)  Have full AROM of the ankle in all planes and equal bilaterally in 8 weeks->50% MET  LTGs:  1.)  Ambulate without an assistive device or walking boot without antalgia in 12 weeks->50%  MET  2.)  Return to all premorbid activities without functional limitations or pain in 16 weeks->NOT MET    *Updated HEP with written and verbal instruction (included in TE billing below).  *Counseled client on weaning schedule from walking boot, how to manage soreness and reduce edema (included in TA billing below).         Manual Therapy:    15     mins  27056;  Therapeutic Exercise:    15     mins  19435;     Neuromuscular Luis Antonio:        mins  02776;    Therapeutic Activity:     10     mins  97913;     Gait Training:           mins  99019;     Ultrasound:          mins  06462;    Electrical Stimulation:         mins  80192 ( );  Dry Needling          mins self-pay    Timed Treatment:   40   mins   Total Treatment:     49   mins    Wicho Lange, PT  Physical Therapist

## 2022-08-15 ENCOUNTER — TREATMENT (OUTPATIENT)
Dept: PHYSICAL THERAPY | Facility: CLINIC | Age: 67
End: 2022-08-15

## 2022-08-15 DIAGNOSIS — M25.572 CHRONIC PAIN OF LEFT ANKLE: Primary | ICD-10-CM

## 2022-08-15 DIAGNOSIS — G89.29 CHRONIC PAIN OF LEFT ANKLE: Primary | ICD-10-CM

## 2022-08-15 PROCEDURE — 97140 MANUAL THERAPY 1/> REGIONS: CPT | Performed by: PHYSICAL THERAPIST

## 2022-08-15 PROCEDURE — 97112 NEUROMUSCULAR REEDUCATION: CPT | Performed by: PHYSICAL THERAPIST

## 2022-08-15 PROCEDURE — 97110 THERAPEUTIC EXERCISES: CPT | Performed by: PHYSICAL THERAPIST

## 2022-08-15 NOTE — PROGRESS NOTES
Physical Therapy Daily Progress Note    Patient: Anderson Goode   : 1955  Diagnosis/ICD-10 Code:  Chronic pain of left ankle [M25.572, G89.29]  Referring practitioner: Jovi Horn PA-C  Date of Initial Visit: Type: THERAPY  Noted: 2022  Today's Date: 8/15/2022  Patient seen for 11 sessions         Anderson Goode reports that his ankle is feeling better.  Is no longer using a boot or assistive device.  Feels like walking has improved and his ankle less stiff.      Subjective     Objective   See Exercise, Manual, and Modality Logs for complete treatment.       Assessment/Plan  Focused visit on improving ankle mobility in all planes.  Initiated WBing exercises to improve sagittal plane motion today.  Combined with improving dynamic control of the foot/ankle complex.         Manual Therapy:    10     mins  38938;  Therapeutic Exercise:    18     mins  33456;     Neuromuscular Luis Antonio:  12      mins  38060;    Therapeutic Activity:          mins  11837;     Gait Training:           mins  93180;     Ultrasound:          mins  91635;    Electrical Stimulation:         mins  42805 ( );  Dry Needling          mins self-pay    Timed Treatment:   40   mins   Total Treatment:     40   mins    Wicho Lange PT  Physical Therapist

## 2022-08-22 ENCOUNTER — TREATMENT (OUTPATIENT)
Dept: PHYSICAL THERAPY | Facility: CLINIC | Age: 67
End: 2022-08-22

## 2022-08-22 DIAGNOSIS — G89.29 CHRONIC PAIN OF LEFT ANKLE: Primary | ICD-10-CM

## 2022-08-22 DIAGNOSIS — M25.572 CHRONIC PAIN OF LEFT ANKLE: Primary | ICD-10-CM

## 2022-08-22 PROCEDURE — 97530 THERAPEUTIC ACTIVITIES: CPT | Performed by: PHYSICAL THERAPIST

## 2022-08-22 PROCEDURE — 97110 THERAPEUTIC EXERCISES: CPT | Performed by: PHYSICAL THERAPIST

## 2022-08-22 PROCEDURE — 97140 MANUAL THERAPY 1/> REGIONS: CPT | Performed by: PHYSICAL THERAPIST

## 2022-08-22 NOTE — PROGRESS NOTES
Physical Therapy Daily Progress Note    Patient: Anderson Goode   : 1955  Diagnosis/ICD-10 Code:  No primary diagnosis found.  Referring practitioner: Jovi Horn PA-C  Date of Initial Visit: No linked episodes  Today's Date: 2022  Patient seen for Visit count could not be calculated. Make sure you are using a visit which is associated with an episode. sessions         Anderson Goode reports that his ankle is feeling better since last visit.  Walked his dog off the leash at a park this morning.  Has some mild soreness along the outside of the ankle.  Reports some bruising along the outside of the foot.  Saw his surgeon last week and released him to continue physical therapy only.        Subjective     Objective   See Exercise, Manual, and Modality Logs for complete treatment.       Assessment/Plan  Continued emphasis on improving ankle mobility via manual therapy and exercises today.  Utilized weight bearing exercises to improve DF mobility and general balance.  Will continue to see Mr. Goode 1x/wk.           Manual Therapy:    15     mins  99053;  Therapeutic Exercise:    14     mins  09392;     Neuromuscular Luis Antonio:        mins  18796;    Therapeutic Activity:     10     mins  79472;     Gait Training:           mins  16644;     Ultrasound:          mins  36132;    Electrical Stimulation:         mins  55663 ( );  Dry Needling          mins self-pay    Timed Treatment:   39   mins   Total Treatment:     39   mins    Wicho Lange PT  Physical Therapist

## 2022-08-30 ENCOUNTER — TREATMENT (OUTPATIENT)
Dept: PHYSICAL THERAPY | Facility: CLINIC | Age: 67
End: 2022-08-30

## 2022-08-30 DIAGNOSIS — G89.29 CHRONIC PAIN OF LEFT ANKLE: Primary | ICD-10-CM

## 2022-08-30 DIAGNOSIS — M25.572 CHRONIC PAIN OF LEFT ANKLE: Primary | ICD-10-CM

## 2022-08-30 PROCEDURE — 97140 MANUAL THERAPY 1/> REGIONS: CPT | Performed by: PHYSICAL THERAPIST

## 2022-08-30 PROCEDURE — 97110 THERAPEUTIC EXERCISES: CPT | Performed by: PHYSICAL THERAPIST

## 2022-08-30 PROCEDURE — 97530 THERAPEUTIC ACTIVITIES: CPT | Performed by: PHYSICAL THERAPIST

## 2022-08-30 NOTE — PROGRESS NOTES
Physical Therapy Daily Progress Note    Patient: Anderson Goode   : 1955  Diagnosis/ICD-10 Code:  Chronic pain of left ankle [M25.572, G89.29]  Referring practitioner: Jovi Horn PA-C  Date of Initial Visit: Type: THERAPY  Noted: 2022  Today's Date: 2022  Patient seen for 13 sessions         Anderson Goode reports that his ankle is continuing to feel better.  Notes more strength with pushing off his foot when walking.  No pain while walking.  His orthopaedic surgeon released him last week.    Subjective     Objective   See Exercise, Manual, and Modality Logs for complete treatment.       Assessment/Plan  Focused visit on improving ankle mobility, mainly into DF.  Combined with improving general balance, DF in loaded position and general LQ strength.           Manual Therapy:    12     mins  02328;  Therapeutic Exercise:    15     mins  28605;     Neuromuscular Luis Antonio:        mins  81981;    Therapeutic Activity:     12     mins  31014;     Gait Training:           mins  29419;     Ultrasound:          mins  24231;    Electrical Stimulation:         mins  28239 ( );  Dry Needling          mins self-pay    Timed Treatment:   39   mins   Total Treatment:     39   mins    Wicho Lange PT  Physical Therapist

## 2022-09-06 ENCOUNTER — TREATMENT (OUTPATIENT)
Dept: PHYSICAL THERAPY | Facility: CLINIC | Age: 67
End: 2022-09-06

## 2022-09-06 DIAGNOSIS — G89.29 CHRONIC PAIN OF LEFT ANKLE: Primary | ICD-10-CM

## 2022-09-06 DIAGNOSIS — M25.572 CHRONIC PAIN OF LEFT ANKLE: Primary | ICD-10-CM

## 2022-09-06 PROCEDURE — 97140 MANUAL THERAPY 1/> REGIONS: CPT | Performed by: PHYSICAL THERAPIST

## 2022-09-06 PROCEDURE — 97530 THERAPEUTIC ACTIVITIES: CPT | Performed by: PHYSICAL THERAPIST

## 2022-09-06 PROCEDURE — 97110 THERAPEUTIC EXERCISES: CPT | Performed by: PHYSICAL THERAPIST

## 2022-09-06 NOTE — PROGRESS NOTES
Physical Therapy Daily Progress Note    Patient: Anderson Goode   : 1955  Diagnosis/ICD-10 Code:  No primary diagnosis found.  Referring practitioner: Jovi Horn PA-C  Date of Initial Visit: No linked episodes  Today's Date: 2022  Patient seen for Visit count could not be calculated. Make sure you are using a visit which is associated with an episode. sessions         Anderson Goode reports that the ankle feels pretty good since starting physical therapy.  Notes that he has had the ability to walk without low top shoes.  Still wearing high top shoes for longer walks, especially with his dog.  Foot feels stronger with pushing off when walking.  Although, his left knee pain has worsened.        Subjective     Objective   See Exercise, Manual, and Modality Logs for complete treatment.     *LEFS:  56/80 (2022:  54/80)  *Figure 8:  57 cm (IE:  62 cm)  *AROM ankle DF/PF/Inv/Ev (deg):  5/55/15/25    Assessment/Plan  Mr. Goode has attended physical therapy for a total of 14 visits s/p L ankle ORIF on 2022.  Has improved mobility in all planes and reduction in ankle edema.  Ambulates with antalgia secondary to L knee pain.  Focused visit today on improving loading DF mobility and unloaded PF strength today.  Will f/u with Mr. Goode in 2 weeks.           Manual Therapy:    15     mins  41446;  Therapeutic Exercise:     15    mins  72063;     Neuromuscular Luis Antonio:        mins  17152;    Therapeutic Activity:     9     mins  03954;     Gait Training:           mins  97512;     Ultrasound:          mins  77309;    Electrical Stimulation:         mins  33756 ( );  Dry Needling          mins self-pay    Timed Treatment:   39   mins   Total Treatment:     39   mins    Wicho Lange, PT  Physical Therapist

## 2022-09-20 ENCOUNTER — TREATMENT (OUTPATIENT)
Dept: PHYSICAL THERAPY | Facility: CLINIC | Age: 67
End: 2022-09-20

## 2022-09-20 DIAGNOSIS — G89.29 CHRONIC PAIN OF LEFT ANKLE: Primary | ICD-10-CM

## 2022-09-20 DIAGNOSIS — M25.572 CHRONIC PAIN OF LEFT ANKLE: Primary | ICD-10-CM

## 2022-09-20 PROCEDURE — 97112 NEUROMUSCULAR REEDUCATION: CPT | Performed by: PHYSICAL THERAPIST

## 2022-09-20 PROCEDURE — 97140 MANUAL THERAPY 1/> REGIONS: CPT | Performed by: PHYSICAL THERAPIST

## 2022-09-20 PROCEDURE — 97530 THERAPEUTIC ACTIVITIES: CPT | Performed by: PHYSICAL THERAPIST

## 2022-09-20 NOTE — PROGRESS NOTES
Physical Therapy Daily Progress Note    Patient: Anderson Goode   : 1955  Diagnosis/ICD-10 Code:  Chronic pain of left ankle [M25.572, G89.29]  Referring practitioner: Jovi Horn PA-C  Date of Initial Visit: Type: THERAPY  Noted: 2022  Today's Date: 2022  Patient seen for 15 sessions         Anderson Goode reports less swelling, soreness and stiffness since last visit.  Feels like he is walking better as well.    Subjective     Objective   See Exercise, Manual, and Modality Logs for complete treatment.       Assessment/Plan  Continued emphasis on improving ankle mobility, general balance and loading tolerance during posterior tibial mm strengthening.           Manual Therapy:   10      mins  70737;  Therapeutic Exercise:         mins  92205;     Neuromuscular Luis Antonio:   12     mins  19153;    Therapeutic Activity:    17      mins  80015;     Gait Training:           mins  24524;     Ultrasound:         mins  30485;    Electrical Stimulation:         mins  23467 ( );  Dry Needling          mins self-pay    Timed Treatment:  39    mins   Total Treatment:    39    mins    Wicho Lange PT  Physical Therapist

## 2022-09-21 ENCOUNTER — OFFICE VISIT (OUTPATIENT)
Dept: FAMILY MEDICINE CLINIC | Facility: CLINIC | Age: 67
End: 2022-09-21

## 2022-09-21 VITALS
SYSTOLIC BLOOD PRESSURE: 122 MMHG | DIASTOLIC BLOOD PRESSURE: 70 MMHG | OXYGEN SATURATION: 95 % | BODY MASS INDEX: 25.65 KG/M2 | HEIGHT: 72 IN | WEIGHT: 189.4 LBS | HEART RATE: 75 BPM

## 2022-09-21 DIAGNOSIS — M25.562 ACUTE PAIN OF LEFT KNEE: ICD-10-CM

## 2022-09-21 DIAGNOSIS — R73.9 HYPERGLYCEMIA: Primary | ICD-10-CM

## 2022-09-21 LAB
EXPIRATION DATE: NORMAL
HBA1C MFR BLD: 5.6 %
Lab: NORMAL

## 2022-09-21 PROCEDURE — 3044F HG A1C LEVEL LT 7.0%: CPT | Performed by: FAMILY MEDICINE

## 2022-09-21 PROCEDURE — 99213 OFFICE O/P EST LOW 20 MIN: CPT | Performed by: FAMILY MEDICINE

## 2022-09-21 PROCEDURE — 83036 HEMOGLOBIN GLYCOSYLATED A1C: CPT | Performed by: FAMILY MEDICINE

## 2022-09-21 NOTE — PROGRESS NOTES
Established Patient Office Visit      Patient Name: Anderson Goode  : 1955   MRN: [id number]   Care Team: Patient Care Team:  Luis Shields DO as PCP - General (Family Medicine)  Slava Bassett MD as Consulting Physician (Otolaryngology)  Trish Dan MD as Consulting Physician (Otolaryngology)  Danilo Doe MD as Consulting Physician (Urology)    Chief Complaint:    Chief Complaint   Patient presents with   • Prediabetes       History of Present Illness: Anderson Goode is a 66 y.o. male who is here today for chief complaint.    HPI    The patient presents today for a 6-month follow-up on prediabetes. He needs an updated hemoglobin A1c today. He was seen last by me in 2022 for his annual wellness visit, and had labs done at that time, which were roughly unremarkable. He did see his urologist over the summer, Dr. Danilo Doe, and had a prostate-specific antigen done at that time, which was 3.53 ng/mL. The patient's most recent laboratory results show that his hemoglobin A1c was at 5.6 Percent.    The patient states that on 2022, he rolled his left ankle and fractured the small bone going down the posterior aspect of his lower extremity and now wears a brace covering both sides of his ankle and attends his physical therapy appointments. The patient explains that he is getting back to walking without a limp; however, while his left knee had issues before the ankle injury, those symptoms have increased in severity after his injury. The patient explains that post surgery, his orthopedist told him that his left knee looked as though it had some issues as well. The patient states that while it wasn't painful, it did cause him to limp. He reports that the physical therapist has been working on his ankle, mainly trying to get his ankle stronger. The patient reports that he had an injury to his left knee approximately 2 years ago which caused him to be unable to  "walk on it for 2 months. He states that did not obtain an x-ray at the time of injury.  He states that he has injured his left knee multiple times over the years and in the past he was able to get back to baseline after a period of time; however, after this injury, his left knee has not returned to baseline. He reports that he wonders if he reinjured his knee when he fractured his ankle due to the nature of his fall.  He explains that he slipped on a puddle of mud and his leg bent underneath him causing him in essence to sit on his foot. When he stood up, his foot \"flopped back down into place.\" therefore causing him to believe it was simply dislocated.    He reports that he has an appointment with his urologist on 09/29/2022. He reports that he was switched to a different prescription last year, and it is doing better than the previous prescription, although he thinks it is causing some constipation. He reports that he takes over-the-counter Dulcolax. He reports that he was taking a stool softener until he broke his ankle, and the constipation was getting worse, so he started taking the Dulcolax. He reports that he has taken MiraLax in the past as well which had positive results for him as well. The patient reports that since his injury and his constipation being worse, he has seen small amounts of bright red blood when he passes stool. He reports that he had a surgery for hemorrhoids in 1974 and he feels that he tears the scar tissue from time to time.  He adds that he knows that he has had polyps recently as well and therefore the bleeding is not an amount that worries him.     This patient is accompanied by their self who contributes to the history of their care.      The following portions of the patient's history were reviewed and updated as appropriate: allergies, current medications, past family history, past medical history, past social history, past surgical history and problem list.    Subjective    "   Review of Systems:   Review of Systems - See HPI    Past Medical History:   Past Medical History:   Diagnosis Date   • Allergic    • Allergic rhinitis    • Chronic low back pain 1988    Injury from fall - History epidural injections ×7   • Colon adenomas 2016   • Colon polyp    • Diverticulosis 2016   • Elbow fracture, right    • Generalized osteoarthritis    • GERD (gastroesophageal reflux disease) Adulthood    Moderate recurring stiffness and achiness   • History of recurrent UTIs 2010   • Hyperlipidemia Adulthood    Long-term tight control on Lipitor   • Obesity 2013    Weight 216 BMI 32   • Prediabetes    • Prostatism 2010   • RBBB    • Vitamin D deficiency        Past Surgical History:   Past Surgical History:   Procedure Laterality Date   • APPENDECTOMY     • CARDIOVASCULAR STRESS TEST      Nuclear stress test normal   • COLONOSCOPY  s   • COLONOSCOPY W/ POLYPECTOMY      Benign adenomas   • EYE SURGERY     • FRACTURE SURGERY  ,,    Two  bones  in ankle and small leg bone   • HEMORRHOIDECTOMY  , s x 3      with banding   • LIFT / REPAIR BROW PTOSIS FOREHEAD Bilateral    • LUMBAR EPIDURAL INJECTION  1988    Injections ×7 after back injury from fall   • TONSILLECTOMY     • WRIST FRACTURE SURGERY Right 2017    Open reduction internal fixation       Family History:   Family History   Problem Relation Age of Onset   • Atrial fibrillation Mother    • Arthritis Mother    • Breast cancer Mother    • Heart attack Father          age 84   • Other Father         MRSA   • Pneumonia Father    • Hyperlipidemia Father    • Obesity Sister    • Esophagitis Sister    • Obesity Brother    • Drug abuse Other         Overdose   • Dementia Other    • Lumbar disc disease Other         Chronic pain syndrome       Social History:   Social History     Socioeconomic History   • Marital status:    Tobacco Use   • Smoking status: Current Some Day  Smoker     Packs/day: 0.25     Types: Electronic Cigarette   • Smokeless tobacco: Never Used   • Tobacco comment: raised  tobacco for years   Vaping Use   • Vaping Use: Never used   Substance and Sexual Activity   • Alcohol use: Yes     Alcohol/week: 7.0 standard drinks     Types: 7 Cans of beer per week     Comment: wife smoked 2 PPD - 34 years   • Drug use: Not Currently     Types: Marijuana   • Sexual activity: Not Currently       Tobacco History:   Social History     Tobacco Use   Smoking Status Current Some Day Smoker   • Packs/day: 0.25   • Types: Electronic Cigarette   Smokeless Tobacco Never Used   Tobacco Comment    raised  tobacco for years       Medications:     Current Outpatient Medications:   •  Ascorbic Acid (VITAMIN C) 500 MG capsule, Take  by mouth daily., Disp: , Rfl:   •  aspirin 325 MG tablet, Take 325 mg by mouth Every Night., Disp: , Rfl:   •  atorvastatin (LIPITOR) 80 MG tablet, TAKE ONE TABLET BY MOUTH EVERY NIGHT AT BEDTIME, Disp: 90 tablet, Rfl: 0  •  Cetirizine HCl (ZyrTEC Allergy) 10 MG capsule, Take 10 mg by mouth Daily., Disp: 90 capsule, Rfl: 3  •  Cholecalciferol (VITAMIN D) 1000 UNITS tablet, Take 1 capsule by mouth., Disp: , Rfl:   •  Glucosamine HCl 1500 MG tablet, Take 1,500 mg by mouth daily., Disp: 30 each, Rfl:   •  hypromellose (NATURES TEARS) 0.4 % solution, Apply  to eye., Disp: , Rfl:   •  ipratropium (ATROVENT) 0.03 % nasal spray, 1 spray into the nostril(s) as directed by provider As Needed., Disp: , Rfl:   •  Multiple Vitamins-Minerals (CENTRUM ADULTS PO), Take  by mouth daily., Disp: , Rfl:   •  Aspirin-Caffeine 500-32.5 MG tablet, Take  by mouth Every Morning., Disp: , Rfl:   •  fluticasone (Flonase Sensimist) 27.5 MCG/SPRAY nasal spray, 2 sprays into the nostril(s) as directed by provider Daily., Disp: 9.1 mL, Rfl: 12  •  oxybutynin XL (Ditropan XL) 10 MG 24 hr tablet, Take 1 tablet by mouth Daily for 360 days., Disp: 90 tablet, Rfl: 3  •  tamsulosin (FLOMAX) 0.4 MG  "capsule 24 hr capsule, Take 1 capsule by mouth Daily., Disp: 90 capsule, Rfl: 3    Allergies:   Allergies   Allergen Reactions   • Cephalexin Rash   • Omeprazole GI Intolerance       Objective   Objective     Physical Exam:  Vital Signs:   Vitals:    09/21/22 1235   BP: 122/70   Pulse: 75   SpO2: 95%   Weight: 85.9 kg (189 lb 6.4 oz)   Height: 182.9 cm (72.01\")     Body mass index is 25.68 kg/m².     Physical Exam  Const: NAD, A & Ox4, Pleasant, Cooperative  Eyes: EOMI, no conjunctivitis  ENT: No nasal discharge present, neck supple  Cardiac: Regular rate and rhythm, no cyanosis  Resp: Respiratory rate within normal limits, no increased work of breathing, no audible wheezing or retractions noted  GI: No distention or ascites  MSK: Motor and sensation grossly intact in bilateral upper extremities  Neurologic: CN II-XII grossly intact  Psych: Appropriate mood and behavior.  Skin: Warm, dry    Procedures/Radiology     Procedures  No radiology results for the last 7 days     Assessment & Plan   Assessment / Plan      Assessment/Plan:   Problems Addressed This Visit  Diagnoses and all orders for this visit:    1. Hyperglycemia (Primary)  Assessment & Plan:  A1c today 5.6%, up a little from March    Orders:  -     POC Glycosylated Hemoglobin (Hb A1C)    2. Acute pain of left knee  -     XR Knee 3 View Left; Future  -     Ambulatory Referral to Physical Therapy Evaluate and treat    Problem List Items Addressed This Visit        Endocrine and Metabolic    Hyperglycemia - Primary    Overview     Diet controlled         Current Assessment & Plan     A1c today 5.6%, up a little from March         Relevant Orders    POC Glycosylated Hemoglobin (Hb A1C) (Completed)      Other Visit Diagnoses     Acute pain of left knee        Relevant Orders    XR Knee 3 View Left    Ambulatory Referral to Physical Therapy Evaluate and treat          1. Prediabetes  - His hemoglobin A1c has been well controlled recently and his latest " laboratory tests show that his latest result was 5.6 percent.    2. Left knee pain  - This has been chronic, especially over the last couple of years, but worsening acutely over the last few months ever since he had his left ankle fracture. Presumably, he has some altered mechanics secondary to the ankle injury. I would like him to work with his current physical therapist he is seeing for the ankle, Wicho Lange PT, to see if he can work with the knee as well. If his knee does not improve, he can certainly go ahead and have x-ray completed and we can move on to MRI if needed.    Patient Instructions   1. If knee pain persists, have x-ray completed and we can proceed to an MRI  2. Use milk of magnesia or Miralax for constipation      Follow Up:   Return in about 6 months (around 3/21/2023) for Medicare Wellness.    DO KERI StevensonE [address]  [facility]  [address] [facility] [address]  Fax [phone]  Phone [phone]       Transcribed from ambient dictation for Luis Shields DO by Jenny Ornelas.  09/21/22   13:39 EDT    Patient verbalized consent to the visit recording.  I have personally performed the services described in this document as transcribed by the above individual, and it is both accurate and complete.  Luis Shields DO  9/25/2022  17:30 EDT

## 2022-09-21 NOTE — PATIENT INSTRUCTIONS
If knee pain persists, have x-ray completed and we can proceed to an MRI  Use milk of magnesia or Miralax for constipation

## 2022-09-27 ENCOUNTER — TREATMENT (OUTPATIENT)
Dept: PHYSICAL THERAPY | Facility: CLINIC | Age: 67
End: 2022-09-27

## 2022-09-27 DIAGNOSIS — M25.572 CHRONIC PAIN OF LEFT ANKLE: Primary | ICD-10-CM

## 2022-09-27 DIAGNOSIS — G89.29 CHRONIC PAIN OF LEFT ANKLE: Primary | ICD-10-CM

## 2022-09-27 PROCEDURE — 97140 MANUAL THERAPY 1/> REGIONS: CPT | Performed by: PHYSICAL THERAPIST

## 2022-09-27 PROCEDURE — 97530 THERAPEUTIC ACTIVITIES: CPT | Performed by: PHYSICAL THERAPIST

## 2022-09-27 PROCEDURE — 97110 THERAPEUTIC EXERCISES: CPT | Performed by: PHYSICAL THERAPIST

## 2022-09-28 NOTE — PROGRESS NOTES
Physical Therapy Daily Progress Note    Patient: Anderson Goode   : 1955  Diagnosis/ICD-10 Code:  Chronic pain of left ankle [M25.572, G89.29]  Referring practitioner: Jovi Horn PA-C  Date of Initial Visit: Type: THERAPY  Noted: 2022  Today's Date: 2022  Patient seen for 16 sessions         Anderson Goode reports that he is feeling better overall.  Has not noticed swelling being an issue.  Mobility is improving, but still limited when rotating his foot inward.  Feels more confident walking on uneven surfaces without using an assistive device.  He feels his main limitation is feeling weakness in his knees.  Particularly, the left knee as pain has been worsening in the past few months.      Subjective     Objective   See Exercise, Manual, and Modality Logs for complete treatment.     *LEFS:  63/80 (8/3/2022:  54/80)  *Ankle AROM DF/PF/Inv/Ev:  0 deg / 55 deg / 18 deg / 28 deg  *Ankle MMT PF/DF/Inv/Ev:  2+/5; 5/5; 3/5; 5/5  *Figure 8 (cm):  59 cm    Assessment/Plan  Mr. Goode has attended physical therapy for a total of 16 visits s/p L ankle ORIF on 2022.  Has made improvements in active mobility in all planes except for inversion mobility.  Still needs more gastroc/soleus strength to improve ambulation quality and loading tolerance of the ankle.      Mr. Goode will need another 4 weeks of physical therapy at 1 time per week to improve his functional status.  Expect discharge after 4 more visits.      Goals  Plan Goals: STGs:  1.)  LEFS improved x 1 MCID in 6 weeks->MET  2.)  Have full AROM of the ankle in all planes and equal bilaterally in 8 weeks->70% MET  LTGs:  1.)  Ambulate without an assistive device or walking boot without antalgia in 12 weeks->75% MET  2.)  Return to all premorbid activities without functional limitations or pain in 16 weeks->50% MET       Manual Therapy:    15     mins  80194;  Therapeutic Exercise:    15     mins  72072;     Neuromuscular Luis Antonio:         mins  41766;    Therapeutic Activity:      10    mins  63937;     Gait Training:           mins  85780;     Ultrasound:          mins  81143;    Electrical Stimulation:         mins  17935 ( );  Dry Needling          mins self-pay    Timed Treatment:   40   mins   Total Treatment:     44   mins    Wicho Lange, PT  Physical Therapist                     no

## 2022-09-29 ENCOUNTER — OFFICE VISIT (OUTPATIENT)
Dept: UROLOGY | Facility: CLINIC | Age: 67
End: 2022-09-29

## 2022-09-29 VITALS — HEIGHT: 72 IN | BODY MASS INDEX: 25.68 KG/M2

## 2022-09-29 DIAGNOSIS — N40.1 BPH WITH OBSTRUCTION/LOWER URINARY TRACT SYMPTOMS: ICD-10-CM

## 2022-09-29 DIAGNOSIS — R39.15 URINARY URGENCY: Primary | ICD-10-CM

## 2022-09-29 DIAGNOSIS — N13.8 BPH WITH OBSTRUCTION/LOWER URINARY TRACT SYMPTOMS: ICD-10-CM

## 2022-09-29 LAB
BILIRUB BLD-MCNC: NEGATIVE MG/DL
CLARITY, POC: CLEAR
COLOR UR: YELLOW
EXPIRATION DATE: NORMAL
GLUCOSE UR STRIP-MCNC: NEGATIVE MG/DL
KETONES UR QL: NEGATIVE
LEUKOCYTE EST, POC: NEGATIVE
Lab: NORMAL
NITRITE UR-MCNC: NEGATIVE MG/ML
PH UR: 6 [PH] (ref 5–8)
PROT UR STRIP-MCNC: NEGATIVE MG/DL
RBC # UR STRIP: NEGATIVE /UL
SP GR UR: 1.01 (ref 1–1.03)
UROBILINOGEN UR QL: NORMAL

## 2022-09-29 PROCEDURE — 51798 US URINE CAPACITY MEASURE: CPT | Performed by: STUDENT IN AN ORGANIZED HEALTH CARE EDUCATION/TRAINING PROGRAM

## 2022-09-29 PROCEDURE — 81003 URINALYSIS AUTO W/O SCOPE: CPT | Performed by: STUDENT IN AN ORGANIZED HEALTH CARE EDUCATION/TRAINING PROGRAM

## 2022-09-29 PROCEDURE — 99214 OFFICE O/P EST MOD 30 MIN: CPT | Performed by: STUDENT IN AN ORGANIZED HEALTH CARE EDUCATION/TRAINING PROGRAM

## 2022-09-29 RX ORDER — TAMSULOSIN HYDROCHLORIDE 0.4 MG/1
1 CAPSULE ORAL DAILY
Qty: 90 CAPSULE | Refills: 3 | Status: SHIPPED | OUTPATIENT
Start: 2022-09-29 | End: 2022-10-25 | Stop reason: SDUPTHER

## 2022-10-04 ENCOUNTER — TREATMENT (OUTPATIENT)
Dept: PHYSICAL THERAPY | Facility: CLINIC | Age: 67
End: 2022-10-04

## 2022-10-04 DIAGNOSIS — M25.572 CHRONIC PAIN OF LEFT ANKLE: Primary | ICD-10-CM

## 2022-10-04 DIAGNOSIS — G89.29 CHRONIC PAIN OF LEFT ANKLE: Primary | ICD-10-CM

## 2022-10-04 PROCEDURE — 97530 THERAPEUTIC ACTIVITIES: CPT | Performed by: PHYSICAL THERAPIST

## 2022-10-04 PROCEDURE — 97112 NEUROMUSCULAR REEDUCATION: CPT | Performed by: PHYSICAL THERAPIST

## 2022-10-04 PROCEDURE — 97140 MANUAL THERAPY 1/> REGIONS: CPT | Performed by: PHYSICAL THERAPIST

## 2022-10-06 NOTE — PROGRESS NOTES
Physical Therapy Daily Progress Note    Patient: Anderson Goode   : 1955  Diagnosis/ICD-10 Code:  Chronic pain of left ankle [M25.572, G89.29]  Referring practitioner: Jovi Horn PA-C  Date of Initial Visit: Type: THERAPY  Noted: 2022  Today's Date: 10/6/2022  Patient seen for 17 sessions         Anderson Goode reports that his ankle is not painful, but just needs more strength at this time.      Subjective     Objective   See Exercise, Manual, and Modality Logs for complete treatment.       Assessment/Plan  Continued emphasis on improving ankle mobility in all planes via manual techniques.  Combined with exercises to improve plantar flexion strength, general balance and ambulation quality.           Manual Therapy:    15     mins  65759;  Therapeutic Exercise:         mins  29270;     Neuromuscular Luis Antonio:    15    mins  55180;    Therapeutic Activity:     10     mins  62837;     Gait Training:           mins  02957;     Ultrasound:          mins  79915;    Electrical Stimulation:         mins  23992 ( );  Dry Needling          mins self-pay    Timed Treatment:   40   mins   Total Treatment:     40   mins    Wicho Lange, ROLF  Physical Therapist

## 2022-10-11 ENCOUNTER — TREATMENT (OUTPATIENT)
Dept: PHYSICAL THERAPY | Facility: CLINIC | Age: 67
End: 2022-10-11

## 2022-10-11 DIAGNOSIS — G89.29 CHRONIC PAIN OF LEFT ANKLE: Primary | ICD-10-CM

## 2022-10-11 DIAGNOSIS — M25.572 CHRONIC PAIN OF LEFT ANKLE: Primary | ICD-10-CM

## 2022-10-11 PROCEDURE — 97530 THERAPEUTIC ACTIVITIES: CPT | Performed by: PHYSICAL THERAPIST

## 2022-10-11 PROCEDURE — 97140 MANUAL THERAPY 1/> REGIONS: CPT | Performed by: PHYSICAL THERAPIST

## 2022-10-11 PROCEDURE — 97110 THERAPEUTIC EXERCISES: CPT | Performed by: PHYSICAL THERAPIST

## 2022-10-12 NOTE — PROGRESS NOTES
Physical Therapy Daily Progress Note    Patient: Anderson Goode   : 1955  Diagnosis/ICD-10 Code:  No primary diagnosis found.  Referring practitioner: Jovi Horn PA-C  Date of Initial Visit: No linked episodes  Today's Date: 10/11/2022  Patient seen for Visit count could not be calculated. Make sure you are using a visit which is associated with an episode. sessions         Anderson Goode reports      Subjective     Objective   See Exercise, Manual, and Modality Logs for complete treatment.     *LEFS:  61/80 (22:  LEFS:  56/80)  *AROM ankle DF/PF/Inv/Ev (deg):  /15/35  *Ankle MMT DF/PF/Inv/Ev:  ; 3-/5; 3/5;     Assessment:  Mr. Goode has attended physical therapy for a total of 18 visits s/p L ankle ORIF on 2022.  Has mostly improved mobility into eversion.  Notable weakness in plantar flexion.  Inversion mobility is still limited, but slowly improving.  Still has decreased stance time during ambulation due to ankle weakness.  No edema present today.      1.)  LEFS improved x 1 MCID in 6 weeks->MET  2.)  Have full AROM of the ankle in all planes and equal bilaterally in 8 weeks->80% MET  LTGs:  1.)  Ambulate without an assistive device or walking boot without antalgia in 12 week->80% MET  2.)  Return to all premorbid activities without functional limitations or pain in 16 weeks->MET    Plan:  Continue to see Mr. Goode for 2 more visits to improve ankle inversion mobility and plantarflexion strength.    *Updated HEP with written and verbal instruction (included in total time billed as TE below).       Manual Therapy:    8     mins  95460;  Therapeutic Exercise:    19     mins  88400;     Neuromuscular Luis Antonio:        mins  23470;    Therapeutic Activity:     11     mins  07365;     Gait Training:           mins  03511;     Ultrasound:          mins  84242;    Electrical Stimulation:         mins  30498 ( );  Dry Needling          mins self-pay    Timed Treatment:   38   mins   Total Treatment:     38   mins    Wicho Lange, PT  Physical Therapist

## 2022-10-18 ENCOUNTER — TREATMENT (OUTPATIENT)
Dept: PHYSICAL THERAPY | Facility: CLINIC | Age: 67
End: 2022-10-18

## 2022-10-18 DIAGNOSIS — G89.29 CHRONIC PAIN OF LEFT ANKLE: Primary | ICD-10-CM

## 2022-10-18 DIAGNOSIS — M25.572 CHRONIC PAIN OF LEFT ANKLE: Primary | ICD-10-CM

## 2022-10-18 PROCEDURE — 97530 THERAPEUTIC ACTIVITIES: CPT | Performed by: PHYSICAL THERAPIST

## 2022-10-18 PROCEDURE — 97110 THERAPEUTIC EXERCISES: CPT | Performed by: PHYSICAL THERAPIST

## 2022-10-18 PROCEDURE — 97140 MANUAL THERAPY 1/> REGIONS: CPT | Performed by: PHYSICAL THERAPIST

## 2022-10-18 NOTE — PROGRESS NOTES
Physical Therapy Daily Progress Note    Patient: Anderson Goode   : 1955  Diagnosis/ICD-10 Code:  Chronic pain of left ankle [M25.572, G89.29]  Referring practitioner: Jovi Horn PA-C  Date of Initial Visit: Type: THERAPY  Noted: 2022  Today's Date: 10/18/2022  Patient seen for 19 sessions         Anderson Goode reports that his ankle is doing well overall.  Feels like it just weak at this time.  Has to go downstairs one step at a time.        Subjective     Objective   See Exercise, Manual, and Modality Logs for complete treatment.       Assessment/Plan  Focused visit on improving ankle mobility in all planes.  Very pleased with his progress in mobility.  Just needs more plantar flexion strength.  Will discharge from physical with [facility] to improve general strength.  I expect continued improvement in strength over the next 6 months.    Goals  Plan Goals: STGs:  1.)  LEFS improved x 1 MCID in 6 weeks.->MET  2.)  Have full AROM of the ankle in all planes and equal bilaterally in 8 weeks.->MET  LTGs:  1.)  Ambulate without an assistive device or walking boot without antalgia in 12 weeks.->75% MET->still has difficulty with fully loading the left leg during stance phase secondary to ankle weakness  2.)  Return to all premorbid activities without functional limitations or pain in 16 weeks->still needs to ride his wheelchair at night time when walking his dog as his ankle gets more fatigued and sore       Manual Therapy:    10     mins  99731;  Therapeutic Exercise:    15     mins  86403;     Neuromuscular Luis Antonio:        mins  94676;    Therapeutic Activity:     15     mins  79589;     Gait Training:           mins  73081;     Ultrasound:          mins  10225;    Electrical Stimulation:         mins  64604 ( );  Dry Needling          mins self-pay    Timed Treatment:   40   mins   Total Treatment:     40   mins    Wicho Lange, PT  Physical Therapist

## 2022-10-25 ENCOUNTER — PROCEDURE VISIT (OUTPATIENT)
Dept: UROLOGY | Facility: CLINIC | Age: 67
End: 2022-10-25

## 2022-10-25 VITALS — WEIGHT: 189 LBS | BODY MASS INDEX: 25.6 KG/M2 | HEIGHT: 72 IN

## 2022-10-25 DIAGNOSIS — N40.1 BPH WITH OBSTRUCTION/LOWER URINARY TRACT SYMPTOMS: Primary | ICD-10-CM

## 2022-10-25 DIAGNOSIS — R39.15 URINARY URGENCY: ICD-10-CM

## 2022-10-25 DIAGNOSIS — N13.8 BPH WITH OBSTRUCTION/LOWER URINARY TRACT SYMPTOMS: Primary | ICD-10-CM

## 2022-10-25 PROCEDURE — 76872 US TRANSRECTAL: CPT | Performed by: STUDENT IN AN ORGANIZED HEALTH CARE EDUCATION/TRAINING PROGRAM

## 2022-10-25 PROCEDURE — 52000 CYSTOURETHROSCOPY: CPT | Performed by: STUDENT IN AN ORGANIZED HEALTH CARE EDUCATION/TRAINING PROGRAM

## 2022-10-25 RX ORDER — TAMSULOSIN HYDROCHLORIDE 0.4 MG/1
1 CAPSULE ORAL DAILY
Qty: 90 CAPSULE | Refills: 3 | Status: SHIPPED | OUTPATIENT
Start: 2022-10-25

## 2022-10-25 NOTE — PROGRESS NOTES
Preprocedure diagnosis  BPH with LUTS    Postprocedure diagnosis  BPH with LUTS    Procedure  Flexible Cystourethroscopy    Attending surgeon  Danilo Doe MD    Anesthesia  2% lidocaine jelly intraurethrally    Complications  None    Indications  66 y.o. male undergoing a flexible cystoscopy for the above mentioned indications.  Informed consent was obtained.      Findings  The urethral urothelium was within normal limits with no visible strictures.      The prostatic urethra revealed moderate to severe lateral lobe coaptation, with minimal median lobe.     Bladder findings included bilateral ureters in orthotopic position, normal bladder mucosa without tumors, lesions, or stones.     Procedure  The patient was placed in supine position and prepped and draped in sterile fashion with lidocaine jelly instill per the urethra for anesthesia 5 minutes prior to procedure start.  A brief timeout was performed including available nursing staff and the patient.      The 16 Fr digital flexible cystoscope was lubricated and gently advanced into the urethral meatus.     The penile and bulbar urethra appeared widely patent without visible lesion or stricture.     The prostatic urethra was notable for moderate to severe lateral lobe coaptation, with minimal median lobe component.      The scope was then advanced into the bladder. The bladder was completely visualized starting with the trigone.     There were bilateral orthotopic ureteral orifices.     The posterior wall, lateral walls, anterior wall, and dome were completely visualized WITHOUT obvious mucosal lesions, tumors, stones, or trabeculations.      The cystoscope was retroflexed and the bladder neck and prostate were further visualized and appeared normal.      The cystoscope was then gently withdrawn while visualizing the urethra and the procedure was then terminated.  The patient tolerated the procedure well.      ==========================    Preprocedure  diagnosis  BPH with LUTS    Postprocedure diagnosis  BPH with LUTS    Procedure  Transrectal ultrasound-guided prostate sizing     Attending surgeon  Danilo Doe MD    Anesthesia  None    Complications  None    Indications  66 y.o. male who has a history of BPH with LUTS presents for prostate sizing for possible surgical planning.     Findings  -Digital rectal examination = Benign, no nodules or induration  -Prostate volume measurements = 74 cc volume     Lab Results   Component Value Date    PSA 3.530 06/27/2022    PSA 3.450 02/17/2021    PSA 2.230 02/12/2020    PSA 0.970 04/28/2017    PSA 1.100 08/19/2016    PSA 0.90 05/29/2015         Procedure   The patient was positioned and prepped in a left lateral position with lower extremities flexed.       A digital rectal exam was performed identifying a benign feeling gland without nodularity or induration.     The rectal ultrasound probe was slowly introduced into the rectum without difficulty.      The prostate and seminal vesicles were inspected systematically using axial and sagittal views with the ultrasound.      The dimensions of the prostate were measured, for a calculated volume of 74 mL, PSA Density 0.047.      The rectal ultrasound probe was removed.      The patient tolerated the procedure well.     Disposition/Follow Up:   Patient reports tamsulosin plus Myrbetriq has significantly improved his symptoms.  He has evidence of lateral lobe hyperplasia with benign prostatic hyperplasia.  His digital rectal examination is benign and PSA density is nonconcerning currently.  We discussed options including greenlight photo vaporization of the prostate or UroLift which are both good options for him and his symptoms.  He states he is currently experiencing a relatively strong stream and would like to continue to monitor symptoms.  We will plan to see him back in 3 months time.    Danilo Doe MD  [facility]

## 2022-10-27 ENCOUNTER — TREATMENT (OUTPATIENT)
Dept: PHYSICAL THERAPY | Facility: CLINIC | Age: 67
End: 2022-10-27

## 2022-10-27 DIAGNOSIS — M25.572 CHRONIC PAIN OF LEFT ANKLE: Primary | ICD-10-CM

## 2022-10-27 DIAGNOSIS — G89.29 CHRONIC PAIN OF LEFT ANKLE: Primary | ICD-10-CM

## 2022-10-27 PROCEDURE — 97161 PT EVAL LOW COMPLEX 20 MIN: CPT | Performed by: PHYSICAL THERAPIST

## 2022-10-27 PROCEDURE — 97110 THERAPEUTIC EXERCISES: CPT | Performed by: PHYSICAL THERAPIST

## 2022-10-27 NOTE — PROGRESS NOTES
Physical Therapy Initial Evaluation and Plan of Care    [facility] [place]  [address]  [place] [id number] [phone]    Patient: Anderson Goode   : 1955  Diagnosis/ICD-10 Code:  No primary diagnosis found.  Referring practitioner: Luis Shields DO    Subjective Evaluation    History of Present Illness  Date of onset: 2022  Date of surgery: 2022  Mechanism of injury: Chronic     Subjective comment: Has had ongoing left knee pain for many years.  Dates back to the late  and early s.  Had two different injuries to the left knee that he said were MCL sprains.  Notes that his left knee has worsened since fracturing his left ankle in the spring of this year.  Has some stiffness in the AM that works itself out over the course of 30 min or less.  Is able to walk his dog for an hour in the mornings without difficulty.  Just has a feeling of instability of the knee when walking on uneven surfaces. (Does not need to take medication for pain or soreness.  No nocturnal disturbance.  No numbness or tingling in the left leg or foot.  History of L4/5 pathology that was eased by injections years ago.  Main complaint is feeling of knee weakness.)  Patient Occupation: Retired Quality of life: excellent    Patient Goals  Patient goal: Help get rid of the feeling of weakness and improve stability on uneven surfaces.         *LEFS:  62/80  Objective          Active Range of Motion   Left Knee   Flexion: 133 degrees   Extension: 0 degrees   Extensor la degrees     Right Knee   Flexion: 125 degrees   Extension: 0 degrees   Extensor la degrees     Strength/Myotome Testing     Left Knee   Prone flexion: 4+  Quadriceps contraction: good    Right Knee   Prone flexion: 4+  Quadriceps contraction: good          Assessment & Plan     Assessment  Impairments: activity intolerance, lacks appropriate home exercise program and weight-bearing  intolerance  Functional Limitations: walking  Assessment details: Mr. Goode is a 66 year old male that presents to physical therapy with chronic left knee pain.  No radiographs performed.  PMH was reviewed.  Knee mobility is excellent.  Knee strength is excellent.  Does have medial joint line pain which could be indicative of early OA symptoms.  Due to previous history of ORIF of the L ankle secondary to a distal tib-fib fracture, this could be a related issue that has exacerbated his symptoms.  Will focus care on improving proximal hip strength, ankle mobility and ankle strength.    Prognosis: fair  Prognosis details: Based on chronicity of symptoms.    Goals  Plan Goals: STGs:  1.)  Report 3/10 or less left knee pain in 4 weeks.  2.)  Self report at least 25% improvement in knee pain and function during ADLs/iADLs in 6 weeks.  LTGs:  1.)  LEFS improved x 8 points in 8 weeks.    Plan  Therapy options: will be seen for skilled therapy services  Planned modality interventions: thermotherapy (hydrocollator packs) and cryotherapy  Planned therapy interventions: therapeutic activities, stretching, strengthening, manual therapy, balance/weight-bearing training, functional ROM exercises and home exercise program  Frequency: 1x week  Duration in visits: 8  Duration in weeks: 8  Treatment plan discussed with: patient        Manual Therapy:         mins  50132;  Therapeutic Exercise:    15     mins  68019;     Neuromuscular Luis Antonio:        mins  87036;    Therapeutic Activity:          mins  00291;     Gait Training:           mins  31986;     Ultrasound:          mins  49068;    Electrical Stimulation:         mins  94079 ( );  Dry Needling          mins self-pay    Timed Treatment:   15   mins   Total Treatment:     45   mins    PT SIGNATURE: Wicho Lange, PT   DATE TREATMENT INITIATED: 10/27/2022    Initial Certification  Certification Period: 1/25/2023  I certify that the therapy services are furnished while  this patient is under my care.  The services outlined above are required by this patient, and will be reviewed every 90 days.     PHYSICIAN: Luis Shields DO  NPI: [id number]                                      DATE:    Please sign and return via fax to [phone].. Thank you, [facility].

## 2022-10-28 ENCOUNTER — TELEPHONE (OUTPATIENT)
Dept: FAMILY MEDICINE CLINIC | Facility: CLINIC | Age: 67
End: 2022-10-28

## 2022-10-28 RX ORDER — ATORVASTATIN CALCIUM 80 MG/1
TABLET, FILM COATED ORAL
Qty: 90 TABLET | Refills: 0 | Status: SHIPPED | OUTPATIENT
Start: 2022-10-28 | End: 2023-01-28

## 2022-10-28 NOTE — TELEPHONE ENCOUNTER
Contacted pt & relayed message from . Pt verbalized understanding and did not have any additional questions at this time.

## 2022-10-28 NOTE — TELEPHONE ENCOUNTER
Please have him monitor at home a couple times daily for a week. It could just be effort related jump. - then have him share any elevated trend with office/MD

## 2022-10-28 NOTE — TELEPHONE ENCOUNTER
I SPOKE TO THE PATIENT HE WAS AGGREGATIVE BECAUSE HE WAS TALKING TO SOMEONE AND [employer] CALLED BACK AND GOT THE HUB AND THEY TRANSFERRED HIM TO ME. HE STATED HE WENT TO PHYSICAL THERAPY  ON Thursday AND HIS BLOOD PRESSURE WAS HIGH AND HE WANTED TO LET DR. RIDDLE KNOW. HE DID NOT WANT TO TALK ON THE PHONE ANYMORE AND KEPT STATING I HAVE TO GO AND HUNG UP.

## 2022-10-28 NOTE — TELEPHONE ENCOUNTER
Caller: Anderson Goode    Relationship: Self    Best call back number: [phone]    Who are you requesting to speak with (clinical staff, provider,  specific staff member): DR. RIDDLE    What was the call regarding: PATIENT CALLING TO STATE THAT HE HAD A SPIKE IN HIS BLOOD PRESSURE DURING HIS PHYSICAL THERAPY YESTERDAY, 10.27.22. 140/90    Do you require a callback: IF NEEDED.    THANK YOU.

## 2022-12-08 ENCOUNTER — OFFICE VISIT (OUTPATIENT)
Dept: UROLOGY | Facility: CLINIC | Age: 67
End: 2022-12-08

## 2022-12-08 VITALS — HEIGHT: 72 IN | WEIGHT: 189 LBS | BODY MASS INDEX: 25.6 KG/M2

## 2022-12-08 DIAGNOSIS — N13.8 BPH WITH OBSTRUCTION/LOWER URINARY TRACT SYMPTOMS: ICD-10-CM

## 2022-12-08 DIAGNOSIS — N40.1 BPH WITH OBSTRUCTION/LOWER URINARY TRACT SYMPTOMS: ICD-10-CM

## 2022-12-08 LAB
BILIRUB BLD-MCNC: NEGATIVE MG/DL
CLARITY, POC: CLEAR
COLOR UR: YELLOW
EXPIRATION DATE: NORMAL
GLUCOSE UR STRIP-MCNC: NEGATIVE MG/DL
KETONES UR QL: NEGATIVE
LEUKOCYTE EST, POC: NEGATIVE
Lab: NORMAL
NITRITE UR-MCNC: NEGATIVE MG/ML
PH UR: 6 [PH] (ref 5–8)
PROT UR STRIP-MCNC: NEGATIVE MG/DL
RBC # UR STRIP: NEGATIVE /UL
SP GR UR: 1.02 (ref 1–1.03)
UROBILINOGEN UR QL: NORMAL

## 2022-12-08 PROCEDURE — 81003 URINALYSIS AUTO W/O SCOPE: CPT | Performed by: STUDENT IN AN ORGANIZED HEALTH CARE EDUCATION/TRAINING PROGRAM

## 2022-12-08 PROCEDURE — 51798 US URINE CAPACITY MEASURE: CPT | Performed by: STUDENT IN AN ORGANIZED HEALTH CARE EDUCATION/TRAINING PROGRAM

## 2022-12-08 PROCEDURE — 99213 OFFICE O/P EST LOW 20 MIN: CPT | Performed by: STUDENT IN AN ORGANIZED HEALTH CARE EDUCATION/TRAINING PROGRAM

## 2022-12-08 NOTE — PROGRESS NOTES
Follow Up Office Visit      Patient Name: Anderson Goode  : 1955   MRN: [id number]     Chief Complaint:    Chief Complaint   Patient presents with   • Benign Prostatic Hypertrophy     With LUTS   • Urinary Urgency       Referring Provider: No ref. provider found    History of Present Illness: Anderson Goode is a 66 y.o. male who presents today for follow up of BPH and LUTS. Has been on chronic alpha blocker treatment as well as prior anticholinergic. He developed significant constipation as a result of anticholinergic treatment.  Patient was transition to Myrbetriq and tamsulosin.  He was taken to procedure suite 10/25/22 for cystoscopy, TRUS prostate sizing, this demonstrated significant lateral lobe hyperplasia with no significant median lobe, prostate volume 74 cc.  His PSA trend has been steadily rising over the last many years but is currently still in normal range.    Patient has no significant complaints today, denies urgency, frequency, incontinence or significant weak stream, IPSS currently 10.  PVR reassuring 21 mL.    He would like to continue to defer any consideration of surgical intervention at this time.  Denies urinary tract infection symptoms.    Subjective      Review of System: Review of Systems   Genitourinary: Negative for difficulty urinating, frequency and urgency.      I have reviewed the ROS documented by my clinical staff, I have updated appropriately and I agree. Danilo Doe MD    I have reviewed and the following portions of the patient's history were updated as appropriate: past family history, past medical history, past social history, past surgical history and problem list.    Medications:     Current Outpatient Medications:   •  Ascorbic Acid (VITAMIN C) 500 MG capsule, Take  by mouth daily., Disp: , Rfl:   •  aspirin 325 MG tablet, Take 325 mg by mouth Every Night., Disp: , Rfl:   •  atorvastatin (LIPITOR) 80 MG tablet, TAKE ONE TABLET BY MOUTH EVERY  NIGHT AT BEDTIME, Disp: 90 tablet, Rfl: 0  •  Cetirizine HCl (ZyrTEC Allergy) 10 MG capsule, Take 10 mg by mouth Daily., Disp: 90 capsule, Rfl: 3  •  Cholecalciferol (VITAMIN D) 1000 UNITS tablet, Take 1 capsule by mouth., Disp: , Rfl:   •  Glucosamine HCl 1500 MG tablet, Take 1,500 mg by mouth daily., Disp: 30 each, Rfl:   •  hypromellose (NATURES TEARS) 0.4 % solution, Apply  to eye., Disp: , Rfl:   •  ipratropium (ATROVENT) 0.03 % nasal spray, 1 spray into the nostril(s) as directed by provider As Needed., Disp: , Rfl:   •  Mirabegron ER (MYRBETRIQ) 50 MG tablet sustained-release 24 hour 24 hr tablet, Take 50 mg by mouth Daily., Disp: 90 tablet, Rfl: 3  •  Multiple Vitamins-Minerals (CENTRUM ADULTS PO), Take  by mouth daily., Disp: , Rfl:   •  tamsulosin (FLOMAX) 0.4 MG capsule 24 hr capsule, Take 1 capsule by mouth Daily., Disp: 90 capsule, Rfl: 3    Allergies:   Allergies   Allergen Reactions   • Cephalexin Rash   • Omeprazole GI Intolerance       IPSS Questionnaire (AUA-7):  Over the past month…    1)  Incomplete Emptying:       How often have you had a sensation of not emptying you had the sensation of not emptying your bladder completely after you finished urinating?  0 - Not at all   2)  Frequency:       How often have you had the urinate again less than two hours after you finished urinating?  1 - Less than 1 time in 5   3)  Intermittency:       How often have you found you stopped and started again several times when you urinated?   4 - More than half the time   4) Urgency:      How often have you found it difficult to postpone urination?  1 - Less than 1 time in 5   5) Weak Stream:      How often have you had a weak urinary stream?  2 - Less than half the time   6) Straining:       How often have you had to push or strain to begin urination?  0 - Not at all   7) Nocturia:      How many times did you most typically get up to urinate from the time you went to bed at night until the time you got up in the  "morning?  2 - 2 times   Total Score:  10   The International Prostate Symptom Score (IPSS) is used to screen, diagnose, track symptoms of benign prostatic hyperplasia (BPH).   0-7 (Mild Symptoms) 8-19 (Moderate) 20-35 (Severe)   Quality of Life (QoL):  If you were to spend the rest of your life with your urinary condition just the way it is now, how would you feel about that? 2-Mostly Satisfied   Urine Leakage (Incontinence) 0-No Leakage     Sexual Health Inventory for Men (ANGI)   Over the past 6 months:     1. How do you rate your confidence that you could get and keep an erection?  3 - Moderate   2. When you had erections with sexual  stimulation, how often were your erections hard enough for penetration (entering your partner)?  0 - No Sexual Activity    3. During sexual intercourse, how often were you able to maintain your erection after you had penetrated (entered) your partner?  0 - Did not attempt intercourse   4. During sexual intercourse, how difficult was it to maintain your erection to completion of intercourse?  0 - Did not attempt intercourse   5. When you attempted sexual intercourse, how often was it satisfactory for you?  0 - Did not attempt intercourse    Total Score: 3   The Sexual Health Inventory for Men further classifies ED severity with the following breakpoints:   1-7 (Severe ED) 8-11 (Moderate ED) 12-16 (Mild to Moderate ED) 17-21 (Mild ED)      Post void residual bladder scan:   21 mL     Objective     Physical Exam:   Vital Signs:   Vitals:    12/08/22 1209   Weight: 85.7 kg (189 lb)   Height: 182.9 cm (72.01\")     Body mass index is 25.63 kg/m².     Physical Exam  Constitutional:       Appearance: Normal appearance.   HENT:      Head: Normocephalic and atraumatic.      Nose: Nose normal.   Eyes:      Extraocular Movements: Extraocular movements intact.      Conjunctiva/sclera: Conjunctivae normal.      Pupils: Pupils are equal, round, and reactive to light.   Musculoskeletal:         " General: Normal range of motion.      Cervical back: Normal range of motion and neck supple.   Skin:     General: Skin is warm and dry.      Findings: No lesion or rash.   Neurological:      General: No focal deficit present.      Mental Status: He is alert and oriented to person, place, and time. Mental status is at baseline.   Psychiatric:         Mood and Affect: Mood normal.         Behavior: Behavior normal.         Labs:   Brief Urine Lab Results  (Last result in the past 365 days)      Color   Clarity   Blood   Leuk Est   Nitrite   Protein   CREAT   Urine HCG        12/08/22 1221 Yellow   Clear   Negative   Negative   Negative   Negative                      Lab Results   Component Value Date    GLUCOSE 96 03/17/2022    CALCIUM 9.5 03/17/2022     03/17/2022    K 4.8 03/17/2022    CO2 27.9 03/17/2022     03/17/2022    BUN 16 03/17/2022    CREATININE 0.86 03/17/2022    EGFRIFNONA 98 02/17/2021    BCR 18.6 03/17/2022    ANIONGAP 8.1 03/17/2022       Lab Results   Component Value Date    WBC 7.30 03/17/2022    HGB 13.9 03/17/2022    HCT 41.7 03/17/2022    MCV 96.8 03/17/2022     03/17/2022       Images:   No Images in the past 120 days found..    Measures:   Tobacco:   Anderson Goode  reports that he has been smoking electronic cigarette and cigarettes. He has been smoking an average of .25 packs per day. He has never used smokeless tobacco.    Assessment / Plan      Assessment/Plan:   66 y.o. male who presented today for follow up of BPH and lower urinary tract symptoms.  He has completed full work-up with cystoscopy and transrectal ultrasound for prostate sizing demonstrate greater than 70 cc gland.  He has lateral lobe hyperplasia.  He is not currently interested in surgical interventions, we discussed again greenlight PVP versus UroLift etc. He would like to continue Myrbetriq with tamsulosin at this time.    Diagnoses and all orders for this visit:    1. BPH with obstruction/lower  urinary tract symptoms    - continue Tamsulosin 0.4 mg  - continue Myrbetriq 50 mg daily    -     POC Urinalysis Dipstick, Automated           Follow Up:   Return in about 1 year (around 12/8/2023).    I spent approximately 20 minutes providing clinical care for this patient; including review of patient's chart and provider documentation, face to face time spent with patient in examination room (obtaining history, performing physical exam, discussing diagnosis and management options), placing orders, and completing patient documentation.     Danilo Doe MD  [facility] [place]

## 2022-12-14 ENCOUNTER — TELEPHONE (OUTPATIENT)
Dept: UROLOGY | Facility: CLINIC | Age: 67
End: 2022-12-14

## 2022-12-14 NOTE — TELEPHONE ENCOUNTER
PT called and said that his blood pressure was unusually high after taking his Mybetriq medicine. When he stops taking it, it goes down, and when he starts taking it again, it goes up. Please return his call, thank you.

## 2022-12-14 NOTE — TELEPHONE ENCOUNTER
Please advise Dr. Doe.  I know Mybetriq has a side effect of increasing blood pressure.  Thank you.

## 2023-01-28 RX ORDER — ATORVASTATIN CALCIUM 80 MG/1
TABLET, FILM COATED ORAL
Qty: 90 TABLET | Refills: 0 | Status: SHIPPED | OUTPATIENT
Start: 2023-01-28

## 2023-01-28 NOTE — TELEPHONE ENCOUNTER
Rx Refill Note  Requested Prescriptions     Pending Prescriptions Disp Refills   • atorvastatin (LIPITOR) 80 MG tablet [Pharmacy Med Name: ATORVASTATIN 80 MG TABLET] 90 tablet 0     Sig: TAKE ONE TABLET BY MOUTH EVERY NIGHT AT BEDTIME      Last office visit with prescribing clinician: 9/21/2022   Last telemedicine visit with prescribing clinician: 3/24/2023   Next office visit with prescribing clinician: 3/24/2023                         Would you like a call back once the refill request has been completed: [] Yes [] No    If the office needs to give you a call back, can they leave a voicemail: [] Yes [] No    Courtney Bowen MA  01/28/23, 10:53 EST

## 2023-04-26 RX ORDER — ATORVASTATIN CALCIUM 80 MG/1
TABLET, FILM COATED ORAL
Qty: 90 TABLET | Refills: 0 | Status: SHIPPED | OUTPATIENT
Start: 2023-04-26

## 2023-05-31 ENCOUNTER — OFFICE VISIT (OUTPATIENT)
Dept: FAMILY MEDICINE CLINIC | Facility: CLINIC | Age: 68
End: 2023-05-31

## 2023-05-31 ENCOUNTER — LAB (OUTPATIENT)
Dept: LAB | Facility: HOSPITAL | Age: 68
End: 2023-05-31

## 2023-05-31 VITALS
HEART RATE: 60 BPM | TEMPERATURE: 96.8 F | BODY MASS INDEX: 25.81 KG/M2 | SYSTOLIC BLOOD PRESSURE: 118 MMHG | WEIGHT: 190.6 LBS | HEIGHT: 72 IN | DIASTOLIC BLOOD PRESSURE: 68 MMHG

## 2023-05-31 DIAGNOSIS — E78.00 PURE HYPERCHOLESTEROLEMIA: ICD-10-CM

## 2023-05-31 DIAGNOSIS — E55.9 VITAMIN D DEFICIENCY: ICD-10-CM

## 2023-05-31 DIAGNOSIS — Z13.29 SCREENING FOR ENDOCRINE DISORDER: ICD-10-CM

## 2023-05-31 DIAGNOSIS — N40.0 PROSTATISM: ICD-10-CM

## 2023-05-31 DIAGNOSIS — Z13.0 SCREENING FOR DEFICIENCY ANEMIA: ICD-10-CM

## 2023-05-31 DIAGNOSIS — R73.9 HYPERGLYCEMIA: ICD-10-CM

## 2023-05-31 DIAGNOSIS — Z91.81 AT MODERATE RISK FOR FALL: ICD-10-CM

## 2023-05-31 DIAGNOSIS — Z00.00 MEDICARE ANNUAL WELLNESS VISIT, SUBSEQUENT: Primary | ICD-10-CM

## 2023-05-31 DIAGNOSIS — H53.2 DOUBLE VISION WITH BOTH EYES OPEN: ICD-10-CM

## 2023-05-31 DIAGNOSIS — Z00.00 PREVENTATIVE HEALTH CARE: ICD-10-CM

## 2023-05-31 LAB
25(OH)D3 SERPL-MCNC: 46.6 NG/ML (ref 30–100)
BILIRUB UR QL STRIP: NEGATIVE
CHOLEST SERPL-MCNC: 139 MG/DL (ref 0–200)
CLARITY UR: CLEAR
COLOR UR: YELLOW
DEPRECATED RDW RBC AUTO: 44 FL (ref 37–54)
ERYTHROCYTE [DISTWIDTH] IN BLOOD BY AUTOMATED COUNT: 12.8 % (ref 12.3–15.4)
EXPIRATION DATE: NORMAL
GLUCOSE UR STRIP-MCNC: NEGATIVE MG/DL
HBA1C MFR BLD: 5.7 %
HCT VFR BLD AUTO: 42.9 % (ref 37.5–51)
HDLC SERPL-MCNC: 60 MG/DL (ref 40–60)
HGB BLD-MCNC: 14.7 G/DL (ref 13–17.7)
HGB UR QL STRIP.AUTO: NEGATIVE
KETONES UR QL STRIP: NEGATIVE
LDLC SERPL CALC-MCNC: 66 MG/DL (ref 0–100)
LDLC/HDLC SERPL: 1.1 {RATIO}
LEUKOCYTE ESTERASE UR QL STRIP.AUTO: NEGATIVE
Lab: NORMAL
MCH RBC QN AUTO: 32.3 PG (ref 26.6–33)
MCHC RBC AUTO-ENTMCNC: 34.3 G/DL (ref 31.5–35.7)
MCV RBC AUTO: 94.3 FL (ref 79–97)
NITRITE UR QL STRIP: NEGATIVE
PH UR STRIP.AUTO: 6 [PH] (ref 5–8)
PLATELET # BLD AUTO: 229 10*3/MM3 (ref 140–450)
PMV BLD AUTO: 9.3 FL (ref 6–12)
PROT UR QL STRIP: NEGATIVE
PSA SERPL-MCNC: 4.32 NG/ML (ref 0–4)
RBC # BLD AUTO: 4.55 10*6/MM3 (ref 4.14–5.8)
SP GR UR STRIP: 1.01 (ref 1–1.03)
TRIGL SERPL-MCNC: 65 MG/DL (ref 0–150)
TSH SERPL DL<=0.05 MIU/L-ACNC: 1.72 UIU/ML (ref 0.27–4.2)
UROBILINOGEN UR QL STRIP: NORMAL
VLDLC SERPL-MCNC: 13 MG/DL (ref 5–40)
WBC NRBC COR # BLD: 8.34 10*3/MM3 (ref 3.4–10.8)

## 2023-05-31 PROCEDURE — 85027 COMPLETE CBC AUTOMATED: CPT

## 2023-05-31 PROCEDURE — 84153 ASSAY OF PSA TOTAL: CPT

## 2023-05-31 PROCEDURE — 82306 VITAMIN D 25 HYDROXY: CPT

## 2023-05-31 PROCEDURE — 81003 URINALYSIS AUTO W/O SCOPE: CPT

## 2023-05-31 PROCEDURE — 84443 ASSAY THYROID STIM HORMONE: CPT

## 2023-05-31 PROCEDURE — 80061 LIPID PANEL: CPT

## 2023-05-31 PROCEDURE — 80053 COMPREHEN METABOLIC PANEL: CPT

## 2023-05-31 RX ORDER — ATORVASTATIN CALCIUM 80 MG/1
80 TABLET, FILM COATED ORAL
Qty: 90 TABLET | Refills: 3 | Status: SHIPPED | OUTPATIENT
Start: 2023-05-31

## 2023-05-31 RX ORDER — IPRATROPIUM BROMIDE 21 UG/1
1 SPRAY, METERED NASAL 2 TIMES DAILY PRN
Qty: 30 ML | Refills: 11 | Status: SHIPPED | OUTPATIENT
Start: 2023-05-31

## 2023-05-31 NOTE — PROGRESS NOTES
The ABCs of the Annual Wellness Visit  Subsequent Medicare Wellness Visit    Subjective    Anderson Goode is a 67 y.o. male who presents for a Subsequent Medicare Wellness Visit.    The following portions of the patient's history were reviewed and   updated as appropriate: allergies, current medications, past family history, past medical history, past social history, past surgical history and problem list.    Compared to one year ago, the patient feels his physical   health is the same.    Compared to one year ago, the patient feels his mental   health is the same.    Recent Hospitalizations:  He was not admitted to the hospital during the last year.       Current Medical Providers:  Patient Care Team:  Luis Shields DO as PCP - General (Family Medicine)  Slava Bassett MD as Consulting Physician (Otolaryngology)  Trish Dan MD as Consulting Physician (Otolaryngology)  Danilo Doe MD as Consulting Physician (Urology)    Outpatient Medications Prior to Visit   Medication Sig Dispense Refill   • Ascorbic Acid (VITAMIN C) 500 MG capsule Take  by mouth daily.     • aspirin 325 MG tablet Take 1 tablet by mouth Every Night.     • Cetirizine HCl (ZyrTEC Allergy) 10 MG capsule Take 10 mg by mouth Daily. 90 capsule 3   • Cholecalciferol (VITAMIN D) 1000 UNITS tablet Take 1 capsule by mouth.     • Glucosamine HCl 1500 MG tablet Take 1,500 mg by mouth daily. 30 each    • hypromellose (NATURES TEARS) 0.4 % solution Apply  to eye.     • Multiple Vitamins-Minerals (CENTRUM ADULTS PO) Take  by mouth daily.     • tamsulosin (FLOMAX) 0.4 MG capsule 24 hr capsule Take 1 capsule by mouth Daily. 90 capsule 3   • atorvastatin (LIPITOR) 80 MG tablet TAKE ONE TABLET BY MOUTH EVERY NIGHT AT BEDTIME 90 tablet 0   • ipratropium (ATROVENT) 0.03 % nasal spray 1 spray into the nostril(s) as directed by provider As Needed. (Patient not taking: Reported on 5/31/2023)     • Mirabegron ER (MYRBETRIQ) 50 MG  "tablet sustained-release 24 hour 24 hr tablet Take 50 mg by mouth Daily. 90 tablet 3     No facility-administered medications prior to visit.       No opioid medication identified on active medication list. I have reviewed chart for other potential  high risk medication/s and harmful drug interactions in the elderly.          Aspirin is on active medication list. Aspirin use is indicated based on review of current medical condition/s. Pros and cons of this therapy have been discussed today. Benefits of this medication outweigh potential harm.  Patient has been encouraged to continue taking this medication.  .      Patient Active Problem List   Diagnosis   • Gastroesophageal reflux disease   • Atopic rhinitis   • Generalized osteoarthritis   • Hyperglycemia   • Hyperlipidemia   • Left anterior fascicular block   • Prostatism   • Chronic pain of both shoulders   • Vitamin D deficiency   • Preventative health care   • Colon adenomas   • RBBB   • Diverticulosis   • Urinary urgency   • BPH with obstruction/lower urinary tract symptoms     Advance Care Planning   Advance Care Planning     Advance Directive is not on file.  ACP discussion was held with the patient during this visit. Patient does not have an advance directive, information provided.     Objective    Vitals:    05/31/23 1123   BP: 118/68   BP Location: Left arm   Patient Position: Sitting   Cuff Size: Adult   Pulse: 60   Temp: 96.8 °F (36 °C)   TempSrc: Infrared   Weight: 86.5 kg (190 lb 9.6 oz)   Height: 182.9 cm (72.01\")   PainSc: 0-No pain     Estimated body mass index is 25.84 kg/m² as calculated from the following:    Height as of this encounter: 182.9 cm (72.01\").    Weight as of this encounter: 86.5 kg (190 lb 9.6 oz).    BMI is >= 25 and <30. (Overweight) The following options were offered after discussion;: none (medical contraindication)      Does the patient have evidence of cognitive impairment? No  ATTENTION  What is the year: correct  What is " the month of the year: correct  What is the day of the week?: correct  What is the date?: correct  MEMORY  Repeat address three times, only score third attempt: [address] [place], [place]: 7  HOW MANY ANIMALS DID THE PATIENT NAME  Verbal Fluency -- Animal Names (0-25): 22+  CLOCK DRAWING  Clock Drawing: All Correct  MEMORY RECALL  Tell me what you remember about that name and address we were repeating at the beginnin  ACE TOTAL SCORE  Total ACE Score - <25/30 strongly suggests cognitive impairment; <21/30 almost certainly shows dementia: 27    Lab Results   Component Value Date    TRIG 65 2023    HDL 60 2023    LDL 66 2023    VLDL 13 2023    HGBA1C 5.7 2023        HEALTH RISK ASSESSMENT    Smoking Status:  Social History     Tobacco Use   Smoking Status Some Days   • Types: Electronic Cigarette   Smokeless Tobacco Never   Tobacco Comments    raised  tobacco for years     Alcohol Consumption:  Social History     Substance and Sexual Activity   Alcohol Use Yes   • Alcohol/week: 7.0 standard drinks   • Types: 7 Cans of beer per week    Comment: wife smoked 2 PPD - 34 years     Fall Risk Screen:    STEADI Fall Risk Assessment was completed, and patient is at MODERATE risk for falls. Assessment completed on:2023    Depression Screenin/31/2023    11:29 AM   PHQ-2/PHQ-9 Depression Screening   Little Interest or Pleasure in Doing Things 0-->not at all   Feeling Down, Depressed or Hopeless 0-->not at all   PHQ-9: Brief Depression Severity Measure Score 0       Health Habits and Functional and Cognitive Screenin/31/2023    11:27 AM   Functional & Cognitive Status   Do you have difficulty preparing food and eating? No   Do you have difficulty bathing yourself, getting dressed or grooming yourself? No   Do you have difficulty using the toilet? No   Do you have difficulty moving around from place to place? Yes   Do you have trouble with steps or  getting out of a bed or a chair? Yes   Current Diet Well Balanced Diet   Dental Exam Up to date   Eye Exam Up to date   Exercise (times per week) 7 times per week   Current Exercises Include Walking   Do you need help using the phone?  No   Are you deaf or do you have serious difficulty hearing?  Yes   Do you need help with transportation? No   Do you need help shopping? No   Do you need help preparing meals?  No   Do you need help with housework?  No   Do you need help with laundry? No   Do you need help taking your medications? No   Do you need help managing money? No   Do you ever drive or ride in a car without wearing a seat belt? No   Have you felt unusual stress, anger or loneliness in the last month? No   Who do you live with? Alone   If you need help, do you have trouble finding someone available to you? Yes   Have you been bothered in the last four weeks by sexual problems? No   Do you have difficulty concentrating, remembering or making decisions? No       Age-appropriate Screening Schedule:  Refer to the list below for future screening recommendations based on patient's age, sex and/or medical conditions. Orders for these recommended tests are listed in the plan section. The patient has been provided with a written plan.    Health Maintenance   Topic Date Due   • TDAP/TD VACCINES (2 - Td or Tdap) 05/11/2020   • Pneumococcal Vaccine 65+ (1 - PCV) 08/04/2023 (Originally 12/25/1961)   • ZOSTER VACCINE (2 of 3) 01/01/2025 (Originally 8/26/2015)   • ANNUAL WELLNESS VISIT  05/31/2024   • LIPID PANEL  05/31/2024   • COLORECTAL CANCER SCREENING  06/13/2029   • HEPATITIS C SCREENING  Completed   • COVID-19 Vaccine  Completed   • AAA SCREEN (ONE-TIME)  Completed   • INFLUENZA VACCINE  Discontinued                  CMS Preventative Services Quick Reference  Risk Factors Identified During Encounter  Fall Risk-High or Moderate: Information on Fall Prevention Shared in After Visit Summary  The above risks/problems have  "been discussed with the patient.  Pertinent information has been shared with the patient in the After Visit Summary.  An After Visit Summary and PPPS were made available to the patient.    The wellness exam has been reviewed in detail.  The patient has been fully counseled on preventative guidelines for vaccines, cancer screenings, and other health maintenance needs.  Functional testing has been performed to assess capacity for independent living and need for other medical interventions.   The patient was counseled on maintaining a lifestyle to promote good health and to minimize chronic diseases.  The patient has been assisted with scheduling healthcare procedures for the coming year and given a written document outlining these recommendations.    Follow Up:   Next Medicare Wellness visit to be scheduled in 1 year.       Additional E&M Note during same encounter follows:  Patient has multiple medical problems which are significant and separately identifiable that require additional work above and beyond the Medicare Wellness Visit.      Chief Complaint  Medicare Wellness-subsequent    Subjective        HPI  Anderson Goode is also being seen today for annual exam. Feels like he has lost weight in his arms and legs and gained central adiposity since [facility]. Overall weight has remained stable. Walks dog regularly but that is only exercise currently.         Objective   Vital Signs:  /68 (BP Location: Left arm, Patient Position: Sitting, Cuff Size: Adult)   Pulse 60   Temp 96.8 °F (36 °C) (Infrared)   Ht 182.9 cm (72.01\")   Wt 86.5 kg (190 lb 9.6 oz)   BMI 25.84 kg/m²     Physical Exam   Const: NAD, A&Ox4, Pleasant, Cooperative  Eyes: EOMI, no conjunctivitis. Right lid lag.  ENT: No nasal discharge present, neck supple  Cardiac: Regular rate and rhythm, no cyanosis  Resp: Respiratory rate within normal limits, no increased work of breathing, no audible wheezing or retractions noted  GI: No " distention or ascites  Neurologic: CN II-XII grossly intact  Psych: Appropriate mood and behavior.  Skin: Pink, warm, dry                 Assessment and Plan   Diagnoses and all orders for this visit:    1. Medicare annual wellness visit, subsequent (Primary)    2. Hyperglycemia  -     POC Glycosylated Hemoglobin (Hb A1C)    3. Preventative health care    4. At moderate risk for fall    5. Double vision with both eyes open  Comments:  History of right eye ptosis had surgery ~2010  Orders:  -     Ambulatory Referral to Ophthalmology    6. Screening for deficiency anemia  -     CBC (No Diff); Future    7. Screening for endocrine disorder  -     Comprehensive Metabolic Panel; Future  -     Urinalysis With Microscopic If Indicated (No Culture) - Urine, Clean Catch; Future  -     TSH; Future    8. Vitamin D deficiency  -     Vitamin D,25-Hydroxy; Future    9. Pure hypercholesterolemia  -     atorvastatin (LIPITOR) 80 MG tablet; Take 1 tablet by mouth every night at bedtime.  Dispense: 90 tablet; Refill: 3  -     Lipid Panel; Future    10. Prostatism  -     PSA DIAGNOSTIC; Future    Other orders  -     ipratropium (ATROVENT) 0.03 % nasal spray; 1 spray into the nostril(s) as directed by provider 2 (Two) Times a Day As Needed for Rhinitis.  Dispense: 30 mL; Refill: 11      Problem List Items Addressed This Visit        Cardiac and Vasculature    Hyperlipidemia    Overview     Atorvastatin 80 mg, full-strength aspirin         Relevant Medications    atorvastatin (LIPITOR) 80 MG tablet    Other Relevant Orders    Lipid Panel (Completed)       Endocrine and Metabolic    Hyperglycemia    Overview     Diet controlled         Relevant Orders    POC Glycosylated Hemoglobin (Hb A1C) (Completed)    Vitamin D deficiency    Relevant Orders    Vitamin D,25-Hydroxy (Completed)       Genitourinary and Reproductive     Prostatism    Relevant Orders    PSA DIAGNOSTIC (Completed)       Health Encounters    Preventative health care   Other  Visit Diagnoses     Medicare annual wellness visit, subsequent    -  Primary    At moderate risk for fall        Double vision with both eyes open        History of right eye ptosis had surgery ~2010    Relevant Orders    Ambulatory Referral to Ophthalmology    Screening for deficiency anemia        Relevant Orders    CBC (No Diff) (Completed)    Screening for endocrine disorder        Relevant Orders    Comprehensive Metabolic Panel (Completed)    Urinalysis With Microscopic If Indicated (No Culture) - Urine, Clean Catch (Completed)    TSH (Completed)        The preventative exam has been reviewed in detail.  The patient has been fully counseled on preventative guidelines for vaccines, cancer screenings, and other health maintenance needs. The patient was counseled on maintaining a lifestyle to promote good health and to minimize chronic diseases.  The patient has been assisted with scheduling healthcare procedures for the coming year and given a written document outlining these recommendations. Age-appropriate screening measures have been ordered for the patient today as indicated above.       Follow Up   Return in about 6 months (around 11/30/2023) for with A1c;.  Patient was given instructions and counseling regarding his condition or for health maintenance advice. Please see specific information pulled into the AVS if appropriate.

## 2023-05-31 NOTE — PATIENT INSTRUCTIONS
Advance Care Planning and Advance Directives     You make decisions on a daily basis - decisions about where you want to live, your career, your home, your life. Perhaps one of the most important decisions you face is your choice for future medical care. Take time to talk with your family and your healthcare team and start planning today.  Advance Care Planning is a process that can help you:  Understand possible future healthcare decisions in light of your own experiences  Reflect on those decision in light of your goals and values  Discuss your decisions with those closest to you and the healthcare professionals that care for you  Make a plan by creating a document that reflects your wishes    Surrogate Decision Maker  In the event of a medical emergency, which has left you unable to communicate or to make your own decisions, you would need someone to make decisions for you.  It is important to discuss your preferences for medical treatment with this person while you are in good health.     Qualities of a surrogate decision maker:  Willing to take on this role and responsibility  Knows what you want for future medical care  Willing to follow your wishes even if they don't agree with them  Able to make difficult medical decisions under stressful circumstances    Advance Directives  These are legal documents you can create that will guide your healthcare team and decision maker(s) when needed. These documents can be stored in the electronic medical record.    Living Will - a legal document to guide your care if you have a terminal condition or a serious illness and are unable to communicate. States vary by statute in document names/types, but most forms may include one or more of the following:        -  Directions regarding life-prolonging treatments        -  Directions regarding artificially provided nutrition/hydration        -  Choosing a healthcare decision maker        -  Direction regarding organ/tissue  donation    Durable Power of  for Healthcare - this document names an -in-fact to make medical decisions for you, but it may also allow this person to make personal and financial decisions for you. Please seek the advice of an  if you need this type of document.    **Advance Directives are not required and no one may discriminate against you if you do not sign one.    Medical Orders  Many states allow specific forms/orders signed by your physician to record your wishes for medical treatment in your current state of health. This form, signed in personal communication with your physician, addresses resuscitation and other medical interventions that you may or may not want.      For more information or to schedule a time with a [facility] Advance Care Planning Facilitator contact: [facility] or call [phone] and someone will contact you directly.    Fall Prevention in the Home, Adult  Falls can cause injuries and affect people of all ages. There are many simple things that you can do to make your home safe and to help prevent falls. Ask for help when making these changes, if needed.  What actions can I take to prevent falls?  General instructions  Use good lighting in all rooms. Replace any light bulbs that burn out, turn on lights if it is dark, and use night-lights.  Place frequently used items in easy-to-reach places. Lower the shelves around your home if necessary.  Set up furniture so that there are clear paths around it. Avoid moving your furniture around.  Remove throw rugs and other tripping hazards from the floor.  Avoid walking on wet floors.  Fix any uneven floor surfaces.  Add color or contrast paint or tape to grab bars and handrails in your home. Place contrasting color strips on the first and last steps of staircases.  When you use a stepladder, make sure that it is completely opened and that the sides and supports are firmly locked. Have someone hold the ladder  while you are using it. Do not climb a closed stepladder.  Know where your pets are when moving through your home.  What can I do in the bathroom?     Keep the floor dry. Immediately clean up any water that is on the floor.  Remove soap buildup in the tub or shower regularly.  Use nonskid mats or decals on the floor of the tub or shower.  Attach bath mats securely with double-sided, nonslip rug tape.  If you need to sit down while you are in the shower, use a plastic, nonslip stool.  Install grab bars by the toilet and in the tub and shower. Do not use towel bars as grab bars.  What can I do in the bedroom?  Make sure that a bedside light is easy to reach.  Do not use oversized bedding that reaches the floor.  Have a firm chair that has side arms to use for getting dressed.  What can I do in the kitchen?  Clean up any spills right away.  If you need to reach for something above you, use a sturdy step stool that has a grab bar.  Keep electrical cables out of the way.  Do not use floor polish or wax that makes floors slippery. If you must use wax, make sure that it is non-skid floor wax.  What can I do with my stairs?  Do not leave any items on the stairs.  Make sure that you have a light switch at the top and the bottom of the stairs. Have them installed if you do not have them.  Make sure that there are handrails on both sides of the stairs. Fix handrails that are broken or loose. Make sure that handrails are as long as the staircases.  Install non-slip stair treads on all stairs in your home.  Avoid having throw rugs at the top or bottom of stairs, or secure the rugs with carpet tape to prevent them from moving.  Choose a carpet design that does not hide the edge of steps on the stairs.  Check any carpeting to make sure that it is firmly attached to the stairs. Fix any carpet that is loose or worn.  What can I do on the outside of my home?  Use bright outdoor lighting.  Regularly repair the edges of walkways and  driveways and fix any cracks.  Remove high doorway thresholds.  Trim any shrubbery on the main path into your home.  Regularly check that handrails are securely fastened and in good repair. Both sides of all steps should have handrails.  Install guardrails along the edges of any raised decks or porches.  Clear walkways of debris and clutter, including tools and rocks.  Have leaves, snow, and ice cleared regularly.  Use sand or salt on walkways during winter months.  In the garage, clean up any spills right away, including grease or oil spills.  What other actions can I take?  Wear closed-toe shoes that fit well and support your feet. Wear shoes that have rubber soles or low heels.  Use mobility aids as needed, such as canes, walkers, scooters, and crutches.  Review your medicines with your health care provider. Some medicines can cause dizziness or changes in blood pressure, which increase your risk of falling.  Talk with your health care provider about other ways that you can decrease your risk of falls. This may include working with a physical therapist or  to improve your strength, balance, and endurance.  Where to find more information  Centers for Disease Control and Prevention, [place]: www.cdc.gov  [place]: www.uche.nih.gov  Contact a health care provider if:  You are afraid of falling at home.  You feel weak, drowsy, or dizzy at home.  You fall at home.  Summary  There are many simple things that you can do to make your home safe and to help prevent falls.  Ways to make your home safe include removing tripping hazards and installing grab bars in the bathroom.  Ask for help when making these changes in your home.  This information is not intended to replace advice given to you by your health care provider. Make sure you discuss any questions you have with your health care provider.  Document Revised: 09/19/2022 Document Reviewed: 07/21/2021  Elsevier Patient Education © 2022 Elsevier  Inc.      Sit-to-Stand Exercise  The sit-to-stand exercise (also known as the chair stand or chair rise exercise) strengthens your lower body and helps you maintain or improve your mobility and independence. The end goal is to do the sit-to-stand exercise without using your hands. This will be easier as you become stronger. You should always talk with your health care provider before starting any exercise program, especially if you have had recent surgery.  Do the exercise exactly as told by your health care provider and adjust it as directed. It is normal to feel mild stretching, pulling, tightness, or discomfort as you do this exercise, but you should stop right away if you feel sudden pain or your pain gets worse. Do not begin doing this exercise until told by your health care provider.  What the sit-to-stand exercise does  The sit-to-stand exercise helps to strengthen the muscles in your thighs and the muscles in the center of your body that give you stability (core muscles). This exercise is especially helpful if:  You have had knee or hip surgery.  You have trouble getting up from a chair, out of a car, or off the toilet due to muscle weakness.  How to do the sit-to-stand exercise  Sit toward the front edge of a sturdy chair without armrests. Your knees should be bent and your feet should be flat on the floor and shoulder-width apart and underneath your hips.  Place your hands lightly on each side of the seat. Keep your back and neck as straight as possible, with your chest slightly forward.  Breathe in slowly. Lean forward and slightly shift your weight to the front of your feet.  Breathe out as you slowly stand up. Try not to support any weight with your hands.  Stand and pause for a full breath in and out.  Breathe in as you sit down slowly. Tighten your core and abdominal muscles to control your lowering as much as possible. You should lower yourself back to the chair slowly, not just drop back into the  seat.  Breathe out slowly.  Do this exercise 10-15 times. If needed, do it fewer times until you build up strength.  Rest for 1 minute, then do another set of 10-15 repetitions.  To change the difficulty of the sit-to-stand exercise  If the exercise is too difficult, use a chair with sturdy armrests, and push off the armrests to help you come to the standing position. You can also use the armrests to help slowly lower yourself back to sitting. As this gets easier, try to use your arms less. You can also place a firm cushion or pillow on the chair to make the surface higher.  If this exercise is too easy, do not use your arms to help raise or lower yourself. You can also wear a weighted vest, use hand weights, increase your repetitions, or try a lower chair.  General tips  You may feel tired when starting an exercise routine. This is normal.  You may have muscle soreness that lasts a few days. This is normal. As you get stronger, you may not feel muscle soreness.  Use smooth, steady movements.  Do not  hold your breath during strength exercises. This can cause unsafe changes in your blood pressure.  Breathe in slowly through your nose, and breathe out slowly through your mouth.  Summary  Strengthening your lower body is an important step to help you move safely and independently.  The sit-to-stand exercise helps strengthen the muscles in your thighs and core.  You should always talk with your health care provider before starting any exercise program, especially if you have had recent surgery.  This information is not intended to replace advice given to you by your health care provider. Make sure you discuss any questions you have with your health care provider.  Document Revised: 04/10/2022 Document Reviewed: 04/10/2022  Elsevier Patient Education © 2022 Elsevier Inc.    You are due for adacel Tdap vaccination. (provides protection against tetanus, diptheria and whooping cough) Please  get the immunization at your  local pharmacy at your earliest convenience. This immunization will next be due in 10 years. Please click on the link for more information about this vaccine.    [facility] Tdap Vaccine Information    You are due for Shingrix vaccination series ( the newest shingles vaccine).  It is a two shot series spaced 2-6 months apart. Please get this vaccine series started at your earliest convenience at your local pharmacy to help avoid shingles outbreak. It is more effective than the old Zostavax vaccine and is recommended even if you have had the Zostavax vaccine in the past.  Once the Shingrix series is completed, it does not need to be repeated.   For more information, please look at the website below:  [facility] Shingrix Vaccine Information      Medicare Wellness  Personal Prevention Plan of Service     Date of Office Visit:    Encounter Provider:  Luis Shields DO  Place of Service:  [facility]  Patient Name: Anderson Goode  :  1955    As part of the Medicare Wellness portion of your visit today, we are providing you with this personalized preventive plan of services (PPPS). This plan is based upon recommendations of the [place] Preventive Services Task Force (USPSTF) and the Advisory Committee on Immunization Practices (ACIP).    This lists the preventive care services that should be considered, and provides dates of when you are due. Items listed as completed are up-to-date and do not require any further intervention.    Health Maintenance   Topic Date Due    TDAP/TD VACCINES (2 - Td or Tdap) 2020    LIPID PANEL  2023    Pneumococcal Vaccine 65+ (1 - PCV) 2023 (Originally 1960)    ZOSTER VACCINE (2 of 3) 2025 (Originally 2015)    ANNUAL WELLNESS VISIT  2024    COLORECTAL CANCER SCREENING  2029    HEPATITIS C SCREENING  Completed    COVID-19 Vaccine  Completed    AAA SCREEN (ONE-TIME)  Completed    INFLUENZA VACCINE   Discontinued       Orders Placed This Encounter   Procedures    CBC (No Diff)     Standing Status:   Future     Standing Expiration Date:   5/31/2024     Order Specific Question:   Release to patient     Answer:   Routine Release    Lipid Panel     Standing Status:   Future     Standing Expiration Date:   5/31/2024    Comprehensive Metabolic Panel     Standing Status:   Future     Standing Expiration Date:   5/31/2024     Order Specific Question:   Release to patient     Answer:   Routine Release    Urinalysis With Microscopic If Indicated (No Culture) - Urine, Clean Catch     Standing Status:   Future     Standing Expiration Date:   5/31/2024     Order Specific Question:   Release to patient     Answer:   Routine Release    TSH     Standing Status:   Future     Standing Expiration Date:   5/31/2024     Order Specific Question:   Release to patient     Answer:   Routine Release    Vitamin D,25-Hydroxy     Standing Status:   Future     Standing Expiration Date:   5/31/2024     Order Specific Question:   Release to patient     Answer:   Routine Release    PSA DIAGNOSTIC     Standing Status:   Future     Standing Expiration Date:   5/31/2024     Order Specific Question:   Release to patient     Answer:   Routine Release    Ambulatory Referral to Ophthalmology     Referral Priority:   Routine     Referral Type:   Consultation     Referral Reason:   Specialty Services Required     Requested Specialty:   Ophthalmology     Number of Visits Requested:   1    POC Glycosylated Hemoglobin (Hb A1C)     Order Specific Question:   Release to patient     Answer:   Routine Release       Return in about 6 months (around 11/30/2023) for with A1c;.

## 2023-06-01 LAB
ALBUMIN SERPL-MCNC: 4.6 G/DL (ref 3.5–5.2)
ALBUMIN/GLOB SERPL: 1.3 G/DL
ALP SERPL-CCNC: 73 U/L (ref 39–117)
ALT SERPL W P-5'-P-CCNC: 23 U/L (ref 1–41)
ANION GAP SERPL CALCULATED.3IONS-SCNC: 12 MMOL/L (ref 5–15)
AST SERPL-CCNC: 25 U/L (ref 1–40)
BILIRUB SERPL-MCNC: 1 MG/DL (ref 0–1.2)
BUN SERPL-MCNC: 13 MG/DL (ref 8–23)
BUN/CREAT SERPL: 17.3 (ref 7–25)
CALCIUM SPEC-SCNC: 9.4 MG/DL (ref 8.6–10.5)
CHLORIDE SERPL-SCNC: 98 MMOL/L (ref 98–107)
CO2 SERPL-SCNC: 26 MMOL/L (ref 22–29)
CREAT SERPL-MCNC: 0.75 MG/DL (ref 0.76–1.27)
EGFRCR SERPLBLD CKD-EPI 2021: 98.3 ML/MIN/1.73
GLOBULIN UR ELPH-MCNC: 3.5 GM/DL
GLUCOSE SERPL-MCNC: 94 MG/DL (ref 65–99)
POTASSIUM SERPL-SCNC: 4.5 MMOL/L (ref 3.5–5.2)
PROT SERPL-MCNC: 8.1 G/DL (ref 6–8.5)
SODIUM SERPL-SCNC: 136 MMOL/L (ref 136–145)

## 2023-11-30 ENCOUNTER — OFFICE VISIT (OUTPATIENT)
Dept: FAMILY MEDICINE CLINIC | Facility: CLINIC | Age: 68
End: 2023-11-30
Payer: MEDICARE

## 2023-11-30 VITALS
HEIGHT: 72 IN | BODY MASS INDEX: 25.73 KG/M2 | WEIGHT: 190 LBS | SYSTOLIC BLOOD PRESSURE: 120 MMHG | DIASTOLIC BLOOD PRESSURE: 76 MMHG

## 2023-11-30 DIAGNOSIS — K92.1 MELENA: ICD-10-CM

## 2023-11-30 DIAGNOSIS — R73.9 HYPERGLYCEMIA: Primary | ICD-10-CM

## 2023-11-30 DIAGNOSIS — Z13.29 SCREENING FOR ENDOCRINE DISORDER: ICD-10-CM

## 2023-11-30 DIAGNOSIS — K92.1 HEMATOCHEZIA: ICD-10-CM

## 2023-11-30 DIAGNOSIS — E78.00 PURE HYPERCHOLESTEROLEMIA: ICD-10-CM

## 2023-11-30 DIAGNOSIS — Z13.0 SCREENING FOR DEFICIENCY ANEMIA: ICD-10-CM

## 2023-11-30 DIAGNOSIS — E55.9 VITAMIN D DEFICIENCY: ICD-10-CM

## 2023-11-30 LAB
EXPIRATION DATE: NORMAL
HBA1C MFR BLD: 5.6 % (ref 4.5–5.7)
Lab: NORMAL

## 2023-11-30 NOTE — PROGRESS NOTES
Established Patient Office Visit      Patient Name: Anderson Goode  : 1955   MRN: [id number]   Care Team: Patient Care Team:  Luis Shields DO as PCP - General (Family Medicine)  Slava Bassett MD as Consulting Physician (Otolaryngology)  Trish Dan MD as Consulting Physician (Otolaryngology)  Danilo Doe MD as Consulting Physician (Urology)    Chief Complaint:    Chief Complaint   Patient presents with    Hyperglycemia     6 month follow-up a1c       History of Present Illness: Anderson Goode is a 67 y.o. male who is here today for chief complaint.    HPI    Had colonoscopy in September, had a couple polyps removed. Was having some dark red bleeding as opposed to the bright red he usually has. Had some slight bleeding a couple weeks after that got better, but has now had some dark red blood in small amounts a few times in the last 2 months. Had an EGD many years sorenson when he had bleeding in the throat, but has not had issues since. No epigastric pain.    This patient is accompanied by their self who contributes to the history of their care.    The following portions of the patient's history were reviewed and updated as appropriate: allergies, current medications, past family history, past medical history, past social history, past surgical history and problem list.    Subjective      Review of Systems:   Review of Systems - See HPI    Past Medical History:   Past Medical History:   Diagnosis Date    Allergic 1960    Allergic rhinitis     Benign prostatic hyperplasia     Chronic low back pain 1988    Injury from fall - History epidural injections ×7    Clotting disorder 1976    Scare tissue/polyps,hemorrhoids    Colon adenomas 2016    Colon polyp 1980s    Diverticulosis 2016    Elbow fracture, right 1964    Generalized osteoarthritis 2005    GERD (gastroesophageal reflux disease) Adulthood    Moderate recurring stiffness and achiness    History of recurrent UTIs  2010    Hyperlipidemia Adulthood    Long-term tight control on Lipitor    Obesity 2013    Weight 216 BMI 32    Prediabetes 2014    Prostatism 2010    RBBB     Vitamin D deficiency        Past Surgical History:   Past Surgical History:   Procedure Laterality Date    APPENDECTOMY  1964    CARDIOVASCULAR STRESS TEST  2014    Nuclear stress test normal    COLONOSCOPY      COLONOSCOPY W/ POLYPECTOMY  2016    Benign adenomas    EYE SURGERY  2000s    FRACTURE SURGERY  ,,    Two  bones  in ankle and small leg bone    HEMORRHOIDECTOMY  , 's x 3      with banding    LIFT / REPAIR BROW PTOSIS FOREHEAD Bilateral 2010    LUMBAR EPIDURAL INJECTION  1988    Injections ×7 after back injury from fall    TONSILLECTOMY  1961    WRIST FRACTURE SURGERY Right 2017    Open reduction internal fixation       Family History:   Family History   Problem Relation Age of Onset    Atrial fibrillation Mother     Arthritis Mother     Breast cancer Mother     Heart attack Father          age 84    Other Father         MRSA    Pneumonia Father     Hyperlipidemia Father     Obesity Sister     Esophagitis Sister     Obesity Brother     Drug abuse Other         Overdose    Dementia Other     Lumbar disc disease Other         Chronic pain syndrome    Cancer Maternal Grandmother        Social History:   Social History     Socioeconomic History    Marital status: Other   Tobacco Use    Smoking status: Some Days     Types: Electronic Cigarette    Smokeless tobacco: Never    Tobacco comments:     raised  tobacco for years   Vaping Use    Vaping Use: Never used   Substance and Sexual Activity    Alcohol use: Yes     Alcohol/week: 7.0 standard drinks of alcohol     Types: 7 Cans of beer per week     Comment: wife smoked 2 PPD - 34 years    Drug use: Not Currently     Types: Marijuana    Sexual activity: Not Currently       Tobacco History:   Social History     Tobacco Use   Smoking Status Some Days    Types: Electronic  "Cigarette   Smokeless Tobacco Never   Tobacco Comments    raised  tobacco for years       Medications:     Current Outpatient Medications:     Ascorbic Acid (VITAMIN C) 500 MG capsule, Take  by mouth daily., Disp: , Rfl:     atorvastatin (LIPITOR) 80 MG tablet, Take 1 tablet by mouth every night at bedtime., Disp: 90 tablet, Rfl: 3    Cetirizine HCl (ZyrTEC Allergy) 10 MG capsule, Take 10 mg by mouth Daily., Disp: 90 capsule, Rfl: 3    Cholecalciferol (VITAMIN D) 1000 UNITS tablet, Take 1 capsule by mouth., Disp: , Rfl:     Glucosamine HCl 1500 MG tablet, Take 1,500 mg by mouth daily., Disp: 30 each, Rfl:     hypromellose (NATURES TEARS) 0.4 % solution, Apply  to eye., Disp: , Rfl:     ipratropium (ATROVENT) 0.03 % nasal spray, 1 spray into the nostril(s) as directed by provider 2 (Two) Times a Day As Needed for Rhinitis., Disp: 30 mL, Rfl: 11    Multiple Vitamins-Minerals (CENTRUM ADULTS PO), Take  by mouth daily., Disp: , Rfl:     tamsulosin (FLOMAX) 0.4 MG capsule 24 hr capsule, Take 1 capsule by mouth Daily., Disp: 90 capsule, Rfl: 3    aspirin 325 MG tablet, Take 1 tablet by mouth Every Night., Disp: , Rfl:     Allergies:   Allergies   Allergen Reactions    Cephalexin Rash    Omeprazole GI Intolerance       Objective   Objective     Physical Exam:  Vital Signs:   Vitals:    11/30/23 1256   BP: 120/76   BP Location: Left arm   Patient Position: Sitting   Cuff Size: Adult   Weight: 86.2 kg (190 lb)   Height: 182.9 cm (72.01\")     Body mass index is 25.76 kg/m².     Physical Exam  Nursing note reviewed  Const: NAD, A&Ox4, Pleasant, Cooperative  Eyes: EOMI, no conjunctivitis  ENT: No nasal discharge present, neck supple  Cardiac: Regular rate and rhythm, no cyanosis  Resp: Respiratory rate within normal limits, no increased work of breathing, no audible wheezing or retractions noted  GI: No distention or ascites  MSK: Motor and sensation grossly intact in bilateral upper extremities  Neurologic: CN II-XII grossly " intact  Psych: Appropriate mood and behavior.  Skin: Warm, dry  Procedures/Radiology     Procedures  No radiology results for the last 7 days     Assessment & Plan   Assessment / Plan      Assessment/Plan:   Problems Addressed This Visit  Diagnoses and all orders for this visit:    1. Hyperglycemia (Primary)  -     POC Glycosylated Hemoglobin (Hb A1C)    2. Hematochezia  -     Ambulatory referral for Screening EGD    3. Melena  -     Ambulatory referral for Screening EGD      Problem List Items Addressed This Visit          Endocrine and Metabolic    Hyperglycemia - Primary    Overview     Diet controlled         Relevant Orders    POC Glycosylated Hemoglobin (Hb A1C)     Other Visit Diagnoses       Hematochezia        Relevant Orders    Ambulatory referral for Screening EGD    Melena        Relevant Orders    Ambulatory referral for Screening EGD              Patient Instructions   A1c today 5.6%, down from 5.7%    Follow Up:   Return in about 6 months (around 5/30/2024) for Medicare Wellness.        DO JONNA Stevenson RD  [facility]  [address]  [facility] [address]  Fax [phone]  Phone [phone]

## 2023-12-11 ENCOUNTER — OFFICE VISIT (OUTPATIENT)
Dept: UROLOGY | Facility: CLINIC | Age: 68
End: 2023-12-11
Payer: MEDICARE

## 2023-12-11 VITALS
HEIGHT: 72 IN | BODY MASS INDEX: 25.06 KG/M2 | DIASTOLIC BLOOD PRESSURE: 74 MMHG | SYSTOLIC BLOOD PRESSURE: 126 MMHG | OXYGEN SATURATION: 96 % | HEART RATE: 76 BPM | WEIGHT: 185 LBS

## 2023-12-11 DIAGNOSIS — R97.20 ELEVATED PSA: ICD-10-CM

## 2023-12-11 DIAGNOSIS — R39.15 URINARY URGENCY: ICD-10-CM

## 2023-12-11 DIAGNOSIS — N13.8 BPH WITH OBSTRUCTION/LOWER URINARY TRACT SYMPTOMS: Primary | ICD-10-CM

## 2023-12-11 DIAGNOSIS — N40.1 BPH WITH OBSTRUCTION/LOWER URINARY TRACT SYMPTOMS: Primary | ICD-10-CM

## 2023-12-11 PROCEDURE — 1159F MED LIST DOCD IN RCRD: CPT | Performed by: STUDENT IN AN ORGANIZED HEALTH CARE EDUCATION/TRAINING PROGRAM

## 2023-12-11 PROCEDURE — 1160F RVW MEDS BY RX/DR IN RCRD: CPT | Performed by: STUDENT IN AN ORGANIZED HEALTH CARE EDUCATION/TRAINING PROGRAM

## 2023-12-11 PROCEDURE — 99214 OFFICE O/P EST MOD 30 MIN: CPT | Performed by: STUDENT IN AN ORGANIZED HEALTH CARE EDUCATION/TRAINING PROGRAM

## 2023-12-11 RX ORDER — FINASTERIDE 5 MG/1
5 TABLET, FILM COATED ORAL DAILY
Qty: 90 TABLET | Refills: 3 | Status: SHIPPED | OUTPATIENT
Start: 2023-12-11

## 2023-12-11 NOTE — PROGRESS NOTES
Follow Up Office Visit      Patient Name: Anderson Goode  : 1955   MRN: [id number]     Chief Complaint:    Chief Complaint   Patient presents with    Benign Prostatic Hypertrophy       Referring Provider: No ref. provider found    History of Present Illness: Anderson Goode is a 67 y.o. male who presents today for follow up of BPH with lower urinary tract symptoms. He has been managed on Flomax 0.4mg daily and Vesicare. He is taking Vesicare every 5 days due to constipation. He was previously trialed on Myrbetriq but discontinued due to HTN. He reports urinary frequency every 2 hours, nocturia x2, weak/moderate urinary stream and intermittency of urinary stream. He has previously deferred bladder outlet procedure. States stream strength is preserved usually.     He does report history of blood in stool, underwent Colonoscopy a few months ago and removal of polyps. Planning to follow up with GI in the next few weeks.     He denies dysuria or hematuria.     Lab Results   Component Value Date    PSA 4.320 (H) 2023    PSA 3.530 2022    PSA 3.450 2021    PSA 2.230 2020    PSA 0.970 2017    PSA 1.100 2016     Patient's PSA is slowly rising over the last few years.  Relatively stable since .  Currently 4.3.  NICHOLAS today is benign.  He does have an enlarged prostate roughly 70 g based on TRUS prostate sizing previously.  PSA density still not concerning at 0.061.    Subjective      Review of System: Review of Systems   Genitourinary:  Negative for dysuria and hematuria.        Weak urinary stream, nocturia x 2      I have reviewed the ROS documented by my clinical staff, I have updated appropriately and I agree. Danilo Doe MD    I have reviewed and the following portions of the patient's history were updated as appropriate: past family history, past medical history, past social history, past surgical history and problem list.    Medications:     Current  Outpatient Medications:     Ascorbic Acid (VITAMIN C) 500 MG capsule, Take  by mouth daily., Disp: , Rfl:     atorvastatin (LIPITOR) 80 MG tablet, Take 1 tablet by mouth every night at bedtime., Disp: 90 tablet, Rfl: 3    Cetirizine HCl (ZyrTEC Allergy) 10 MG capsule, Take 10 mg by mouth Daily., Disp: 90 capsule, Rfl: 3    Cholecalciferol (VITAMIN D) 1000 UNITS tablet, Take 1 capsule by mouth., Disp: , Rfl:     Glucosamine HCl 1500 MG tablet, Take 1,500 mg by mouth daily., Disp: 30 each, Rfl:     hypromellose (NATURES TEARS) 0.4 % solution, Apply  to eye., Disp: , Rfl:     ipratropium (ATROVENT) 0.03 % nasal spray, 1 spray into the nostril(s) as directed by provider 2 (Two) Times a Day As Needed for Rhinitis., Disp: 30 mL, Rfl: 11    Multiple Vitamins-Minerals (CENTRUM ADULTS PO), Take  by mouth daily., Disp: , Rfl:     tamsulosin (FLOMAX) 0.4 MG capsule 24 hr capsule, Take 1 capsule by mouth Daily., Disp: 90 capsule, Rfl: 3    aspirin 325 MG tablet, Take 1 tablet by mouth Every Night., Disp: , Rfl:     finasteride (PROSCAR) 5 MG tablet, Take 1 tablet by mouth Daily., Disp: 90 tablet, Rfl: 3    Allergies:   Allergies   Allergen Reactions    Cephalexin Rash    Omeprazole GI Intolerance    Myrbetriq [Mirabegron] Other (See Comments)     Hypertension         IPSS Questionnaire (AUA-7):  Over the past month…    1)  Incomplete Emptying:       How often have you had a sensation of not emptying you had the sensation of not emptying your bladder completely after you finished urinating?  0 - Not at all   2)  Frequency:       How often have you had the urinate again less than two hours after you finished urinating?  1 - Less than 1 time in 5   3)  Intermittency:       How often have you found you stopped and started again several times when you urinated?   4 - More than half the time   4) Urgency:      How often have you found it difficult to postpone urination?  2 - Less than half the time   5) Weak Stream:      How often  "have you had a weak urinary stream?  2 - Less than half the time   6) Straining:       How often have you had to push or strain to begin urination?  0 - Not at all   7) Nocturia:      How many times did you most typically get up to urinate from the time you went to bed at night until the time you got up in the morning?  2 - 2 times   Total Score:  11   The International Prostate Symptom Score (IPSS) is used to screen, diagnose, track symptoms of benign prostatic hyperplasia (BPH).   0-7 (Mild Symptoms) 8-19 (Moderate) 20-35 (Severe)   Quality of Life (QoL):  If you were to spend the rest of your life with your urinary condition just the way it is now, how would you feel about that? 3-Mixed   Urine Leakage (Incontinence) 0-No Leakage     Sexual Health Inventory for Men (ANGI)   Over the past 6 months:     1. How do you rate your confidence that you could get and keep an erection?  3 - Moderate   2. When you had erections with sexual  stimulation, how often were your erections hard enough for penetration (entering your partner)?  0 - No Sexual Activity    3. During sexual intercourse, how often were you able to maintain your erection after you had penetrated (entered) your partner?  0 - Did not attempt intercourse   4. During sexual intercourse, how difficult was it to maintain your erection to completion of intercourse?  0 - Did not attempt intercourse   5. When you attempted sexual intercourse, how often was it satisfactory for you?  0 - Did not attempt intercourse    Total Score: 3   The Sexual Health Inventory for Men further classifies ED severity with the following breakpoints:   1-7 (Severe ED) 8-11 (Moderate ED) 12-16 (Mild to Moderate ED) 17-21 (Mild ED)        Objective     Physical Exam:   Vital Signs:   Vitals:    12/11/23 1152   BP: 126/74   Pulse: 76   SpO2: 96%   Weight: 83.9 kg (185 lb)   Height: 182.9 cm (72.01\")     Body mass index is 25.08 kg/m².     Physical Exam  Vitals and nursing note reviewed. "   Constitutional:       Appearance: Normal appearance.   HENT:      Head: Normocephalic and atraumatic.      Nose: Nose normal.      Mouth/Throat:      Mouth: Mucous membranes are moist.   Eyes:      Pupils: Pupils are equal, round, and reactive to light.   Pulmonary:      Effort: Pulmonary effort is normal.   Abdominal:      General: Abdomen is flat.      Palpations: Abdomen is soft.   Musculoskeletal:         General: Normal range of motion.      Cervical back: Normal range of motion.   Skin:     General: Skin is warm and dry.      Capillary Refill: Capillary refill takes less than 2 seconds.   Neurological:      General: No focal deficit present.      Mental Status: He is alert.   Psychiatric:         Mood and Affect: Mood normal.       Labs:   Brief Urine Lab Results  (Last result in the past 365 days)        Color   Clarity   Blood   Leuk Est   Nitrite   Protein   CREAT   Urine HCG        05/31/23 1209 Yellow   Clear   Negative   Negative   Negative   Negative                        Lab Results   Component Value Date    GLUCOSE 94 05/31/2023    CALCIUM 9.4 05/31/2023     05/31/2023    K 4.5 05/31/2023    CO2 26.0 05/31/2023    CL 98 05/31/2023    BUN 13 05/31/2023    CREATININE 0.75 (L) 05/31/2023    EGFRIFNONA 98 02/17/2021    BCR 17.3 05/31/2023    ANIONGAP 12.0 05/31/2023       Lab Results   Component Value Date    WBC 8.34 05/31/2023    HGB 14.7 05/31/2023    HCT 42.9 05/31/2023    MCV 94.3 05/31/2023     05/31/2023       Images:   No Images in the past 120 days found..    Measures:   Tobacco:   Anderson Goode  reports that he has been smoking electronic cigarette. He has never used smokeless tobacco..     Urine Incontinence: Patient reports that he is not currently experiencing any symptoms of urinary incontinence.     Assessment / Plan      Assessment/Plan:   67 y.o. male who presented today for follow up of BPH with lower urinary tract symptoms.     We discussed proceeding with bladder  outlet procedure to include greenlight PVP vs. UroLift. He defers procedure at this time. He will continue Flomax and Vesicare.  We discussed maximal medical therapy would include the addition of finasteride to help shrink prostate long-term, this can take 3 to 6 months to have an effect.  He will begin Finasteride 5mg once daily.  Risks discussed.    Last PSA 05/2023; 4.3.  I would recommend rechecking a PSA in 6 months.  If PSA continues to rise I will recommend MRI or further testing to rule out prostate cancer.  NICHOLAS is benign today.    He will follow up in 6 months with repeat PSA prior.     Diagnoses and all orders for this visit:    1. BPH with obstruction/lower urinary tract symptoms (Primary)  -     finasteride (PROSCAR) 5 MG tablet; Take 1 tablet by mouth Daily.  Dispense: 90 tablet; Refill: 3    2. Urinary urgency    3. Elevated PSA  -     PSA Total+% Free (Serial); Future       Follow Up:   Return in about 6 months (around 6/11/2024) for PSA prior .    I spent approximately 30 minutes providing clinical care for this patient; including review of patient's chart and provider documentation, face to face time spent with patient in examination room (obtaining history, performing physical exam, discussing diagnosis and management options), placing orders, and completing patient documentation.     Danilo Doe MD  [facility]

## 2024-01-01 DIAGNOSIS — N13.8 BPH WITH OBSTRUCTION/LOWER URINARY TRACT SYMPTOMS: ICD-10-CM

## 2024-01-01 DIAGNOSIS — N40.1 BPH WITH OBSTRUCTION/LOWER URINARY TRACT SYMPTOMS: ICD-10-CM

## 2024-01-01 DIAGNOSIS — R39.15 URINARY URGENCY: ICD-10-CM

## 2024-01-02 RX ORDER — TAMSULOSIN HYDROCHLORIDE 0.4 MG/1
1 CAPSULE ORAL DAILY
Qty: 90 CAPSULE | Refills: 3 | Status: SHIPPED | OUTPATIENT
Start: 2024-01-02

## 2024-01-02 NOTE — TELEPHONE ENCOUNTER
Rx Refill Note  Requested Prescriptions     Pending Prescriptions Disp Refills    tamsulosin (FLOMAX) 0.4 MG capsule 24 hr capsule [Pharmacy Med Name: TAMSULOSIN HCL 0.4 MG CAPSULE] 90 capsule 3     Sig: TAKE ONE CAPSULE BY MOUTH DAILY      Last office visit with prescribing clinician: 12/11/2023   Next office visit with prescribing clinician: 6/13/2024       Mei Batres MA  01/02/24, 08:37 EST

## 2024-02-19 ENCOUNTER — TELEPHONE (OUTPATIENT)
Dept: FAMILY MEDICINE CLINIC | Facility: CLINIC | Age: 69
End: 2024-02-19
Payer: MEDICARE

## 2024-02-19 NOTE — TELEPHONE ENCOUNTER
Caller: Anderson Goode    Relationship: Self    Best call back number: [phone]     What is the medical concern/diagnosis: RIGHT EYE DROOPING CAUSING DOUBLE VISION    What specialty or service is being requested: EYE DOCTOR    What is the provider, practice or medical service name: SAW DR LAIRD FOR HIS SURGERY 10-12 YEARS AGO. THE DROOPING IS STARTING AGAIN AND IS GETTING WORSE. DOCTOR HAS RETIRED. WOULD LIKE TO GO TO A  PROVIDER    What is the office location: [facility]    Any additional details: PLEASE CALL PATIENT WITH REFERRAL DETAILS TO A [place] [facility] SPECIALIST WITHIN [facility]. PATIENT [place] ON [place] OF [place] AND WOULD LIKE SOMETHING ON THAT SIDE OF [facility].

## 2024-02-20 ENCOUNTER — OFFICE VISIT (OUTPATIENT)
Dept: FAMILY MEDICINE CLINIC | Facility: CLINIC | Age: 69
End: 2024-02-20
Payer: MEDICARE

## 2024-02-20 VITALS
HEIGHT: 72 IN | WEIGHT: 186.4 LBS | DIASTOLIC BLOOD PRESSURE: 72 MMHG | BODY MASS INDEX: 25.25 KG/M2 | SYSTOLIC BLOOD PRESSURE: 128 MMHG

## 2024-02-20 DIAGNOSIS — H53.2 DOUBLE VISION WITH BOTH EYES OPEN: Primary | ICD-10-CM

## 2024-02-20 DIAGNOSIS — L84 CORN OF TOE: ICD-10-CM

## 2024-02-20 DIAGNOSIS — H02.401 PTOSIS OF EYELID, RIGHT: ICD-10-CM

## 2024-02-20 PROCEDURE — 99213 OFFICE O/P EST LOW 20 MIN: CPT | Performed by: FAMILY MEDICINE

## 2024-03-11 NOTE — PROGRESS NOTES
Established Patient Office Visit      Patient Name: Anderson Goode  : 1955   MRN: [id number]   Care Team: Patient Care Team:  Lius Shields DO as PCP - General (Family Medicine)  Slava Bassett MD as Consulting Physician (Otolaryngology)  Trish Dan MD as Consulting Physician (Otolaryngology)  Danilo Doe MD as Consulting Physician (Urology)    Chief Complaint:    Chief Complaint   Patient presents with    Eye Strain     Pt co drooping eye       History of Present Illness: Anderson Goode is a 68 y.o. male who is here today for chief complaint.    HPI    Drooping right eyelid, has happened previously. No neuro symptoms.    This patient is accompanied by their wife who contributes to the history of their care.    The following portions of the patient's history were reviewed and updated as appropriate: allergies, current medications, past family history, past medical history, past social history, past surgical history and problem list.    Subjective      Review of Systems:   Review of Systems - See HPI    Past Medical History:   Past Medical History:   Diagnosis Date    Allergic 1960    Allergic rhinitis     Benign prostatic hyperplasia     Chronic low back pain 1988    Injury from fall - History epidural injections ×7    Clotting disorder 1976    Scare tissue/polyps,hemorrhoids    Colon adenomas 2016    Colon polyp 1980s    Diverticulosis 2016    Elbow fracture, right 1964    Generalized osteoarthritis 2005    GERD (gastroesophageal reflux disease) Adulthood    Moderate recurring stiffness and achiness    History of recurrent UTIs 2010    Hyperlipidemia Adulthood    Long-term tight control on Lipitor    Obesity 2013    Weight 216 BMI 32    Prediabetes 2014    Prostatism 2010    RBBB     Vitamin D deficiency        Past Surgical History:   Past Surgical History:   Procedure Laterality Date    APPENDECTOMY  1964    CARDIOVASCULAR STRESS TEST  2014    Nuclear stress  test normal    COLONOSCOPY      COLONOSCOPY W/ POLYPECTOMY      Benign adenomas    EYE SURGERY  2000s    FRACTURE SURGERY  1964,,    Two  bones  in ankle and small leg bone    HEMORRHOIDECTOMY  , 's x 3     1990s with banding    LIFT / REPAIR BROW PTOSIS FOREHEAD Bilateral     LUMBAR EPIDURAL INJECTION  1988    Injections ×7 after back injury from fall    TONSILLECTOMY      WRIST FRACTURE SURGERY Right 2017    Open reduction internal fixation       Family History:   Family History   Problem Relation Age of Onset    Atrial fibrillation Mother     Arthritis Mother     Breast cancer Mother     Heart attack Father          age 84    Other Father         MRSA    Pneumonia Father     Hyperlipidemia Father     Obesity Sister     Esophagitis Sister     Obesity Brother     Drug abuse Other         Overdose    Dementia Other     Lumbar disc disease Other         Chronic pain syndrome    Cancer Maternal Grandmother        Social History:   Social History     Socioeconomic History    Marital status: Other   Tobacco Use    Smoking status: Some Days     Types: Electronic Cigarette    Smokeless tobacco: Never    Tobacco comments:     raised  tobacco for years   Vaping Use    Vaping status: Never Used   Substance and Sexual Activity    Alcohol use: Yes     Alcohol/week: 7.0 standard drinks of alcohol     Types: 7 Cans of beer per week     Comment: wife smoked 2 PPD - 34 years    Drug use: Not Currently     Types: Marijuana    Sexual activity: Not Currently       Tobacco History:   Social History     Tobacco Use   Smoking Status Some Days    Types: Electronic Cigarette   Smokeless Tobacco Never   Tobacco Comments    raised  tobacco for years       Medications:     Current Outpatient Medications:     Ascorbic Acid (VITAMIN C) 500 MG capsule, Take  by mouth daily., Disp: , Rfl:     aspirin 325 MG tablet, Take 1 tablet by mouth Every Night., Disp: , Rfl:     atorvastatin (LIPITOR) 80 MG tablet,  "Take 1 tablet by mouth every night at bedtime., Disp: 90 tablet, Rfl: 3    Cetirizine HCl (ZyrTEC Allergy) 10 MG capsule, Take 10 mg by mouth Daily., Disp: 90 capsule, Rfl: 3    Cholecalciferol (VITAMIN D) 1000 UNITS tablet, Take 1 capsule by mouth., Disp: , Rfl:     finasteride (PROSCAR) 5 MG tablet, Take 1 tablet by mouth Daily., Disp: 90 tablet, Rfl: 3    Glucosamine HCl 1500 MG tablet, Take 1,500 mg by mouth daily., Disp: 30 each, Rfl:     hypromellose (NATURES TEARS) 0.4 % solution, Apply  to eye., Disp: , Rfl:     ipratropium (ATROVENT) 0.03 % nasal spray, 1 spray into the nostril(s) as directed by provider 2 (Two) Times a Day As Needed for Rhinitis., Disp: 30 mL, Rfl: 11    Multiple Vitamins-Minerals (CENTRUM ADULTS PO), Take  by mouth daily., Disp: , Rfl:     tamsulosin (FLOMAX) 0.4 MG capsule 24 hr capsule, TAKE ONE CAPSULE BY MOUTH DAILY, Disp: 90 capsule, Rfl: 3    Allergies:   Allergies   Allergen Reactions    Cephalexin Rash    Omeprazole GI Intolerance    Myrbetriq [Mirabegron] Other (See Comments)     Hypertension         Objective   Objective     Physical Exam:  Vital Signs:   Vitals:    02/20/24 1456   BP: 128/72   BP Location: Left arm   Patient Position: Sitting   Cuff Size: Adult   Weight: 84.6 kg (186 lb 6.4 oz)   Height: 182.9 cm (72.01\")     Body mass index is 25.27 kg/m².     Physical Exam  Nursing note reviewed  Const: NAD, A&Ox4, Pleasant, Cooperative  Eyes: EOMI, no conjunctivitis  ENT: No nasal discharge present, neck supple  Cardiac: Regular rate and rhythm, no cyanosis  Resp: Respiratory rate within normal limits, no increased work of breathing, no audible wheezing or retractions noted  GI: No distention or ascites  MSK: Motor and sensation grossly intact in bilateral upper extremities  Neurologic: CN II-XII grossly intact  Psych: Appropriate mood and behavior.  Skin: Warm, dry  Procedures/Radiology     Procedures  No radiology results for the last 7 days     Assessment & Plan "   Assessment / Plan      Assessment/Plan:   Problems Addressed This Visit  Diagnoses and all orders for this visit:    1. Double vision with both eyes open (Primary)  -     Ambulatory Referral to Ophthalmology    2. Ptosis of eyelid, right  Comments:  Previously had surgery to repeair ~10-12 years  Orders:  -     Ambulatory Referral to Ophthalmology    3. [place] of toe  Comments:  Left 2nd toe. Dystrophic nails. Requests podiatry referral.  Orders:  -     Ambulatory Referral to Podiatry      Problem List Items Addressed This Visit    None  Visit Diagnoses       Double vision with both eyes open    -  Primary    Relevant Orders    Ambulatory Referral to Ophthalmology (Completed)    Ptosis of eyelid, right        Previously had surgery to repeair ~10-12 years    Relevant Orders    Ambulatory Referral to Ophthalmology (Completed)    [place] of toe        Left 2nd toe. Dystrophic nails. Requests podiatry referral.    Relevant Orders    Ambulatory Referral to Podiatry (Completed)            There are no Patient Instructions on file for this visit.    Follow Up:   No follow-ups on file.        DO JONNA Stevenson RD  [facility]  [address]  [facility] [id number]  Fax [phone]  Phone [phone]

## 2024-06-03 ENCOUNTER — OFFICE VISIT (OUTPATIENT)
Dept: FAMILY MEDICINE CLINIC | Facility: CLINIC | Age: 69
End: 2024-06-03
Payer: MEDICARE

## 2024-06-03 VITALS
WEIGHT: 175.8 LBS | OXYGEN SATURATION: 98 % | HEIGHT: 72 IN | BODY MASS INDEX: 23.81 KG/M2 | DIASTOLIC BLOOD PRESSURE: 70 MMHG | SYSTOLIC BLOOD PRESSURE: 110 MMHG | HEART RATE: 85 BPM

## 2024-06-03 DIAGNOSIS — Z00.00 PREVENTATIVE HEALTH CARE: ICD-10-CM

## 2024-06-03 DIAGNOSIS — Z00.00 MEDICARE ANNUAL WELLNESS VISIT, SUBSEQUENT: Primary | ICD-10-CM

## 2024-06-03 DIAGNOSIS — Z91.81 AT MODERATE RISK FOR FALL: ICD-10-CM

## 2024-06-03 PROCEDURE — G0439 PPPS, SUBSEQ VISIT: HCPCS | Performed by: FAMILY MEDICINE

## 2024-06-03 PROCEDURE — 1126F AMNT PAIN NOTED NONE PRSNT: CPT | Performed by: FAMILY MEDICINE

## 2024-06-03 PROCEDURE — 99397 PER PM REEVAL EST PAT 65+ YR: CPT | Performed by: FAMILY MEDICINE

## 2024-06-03 NOTE — PATIENT INSTRUCTIONS
Advance Care Planning and Advance Directives     You make decisions on a daily basis - decisions about where you want to live, your career, your home, your life. Perhaps one of the most important decisions you face is your choice for future medical care. Take time to talk with your family and your healthcare team and start planning today.  Advance Care Planning is a process that can help you:  Understand possible future healthcare decisions in light of your own experiences  Reflect on those decision in light of your goals and values  Discuss your decisions with those closest to you and the healthcare professionals that care for you  Make a plan by creating a document that reflects your wishes    Surrogate Decision Maker  In the event of a medical emergency, which has left you unable to communicate or to make your own decisions, you would need someone to make decisions for you.  It is important to discuss your preferences for medical treatment with this person while you are in good health.     Qualities of a surrogate decision maker:  Willing to take on this role and responsibility  Knows what you want for future medical care  Willing to follow your wishes even if they don't agree with them  Able to make difficult medical decisions under stressful circumstances    Advance Directives  These are legal documents you can create that will guide your healthcare team and decision maker(s) when needed. These documents can be stored in the electronic medical record.    Living Will - a legal document to guide your care if you have a terminal condition or a serious illness and are unable to communicate. States vary by statute in document names/types, but most forms may include one or more of the following:        -  Directions regarding life-prolonging treatments        -  Directions regarding artificially provided nutrition/hydration        -  Choosing a healthcare decision maker        -  Direction regarding organ/tissue  donation    Durable Power of  for Healthcare - this document names an -in-fact to make medical decisions for you, but it may also allow this person to make personal and financial decisions for you. Please seek the advice of an  if you need this type of document.    **Advance Directives are not required and no one may discriminate against you if you do not sign one.    Medical Orders  Many states allow specific forms/orders signed by your physician to record your wishes for medical treatment in your current state of health. This form, signed in personal communication with your physician, addresses resuscitation and other medical interventions that you may or may not want.      For more information or to schedule a time with a Psychiatric Advance Care Planning Facilitator contact: [facility] or call [phone] and someone will contact you directly.  Fall Prevention in the Home, Adult  Falls can cause injuries and affect people of all ages. There are many simple things that you can do to make your home safe and to help prevent falls.  If you need it, ask for help making these changes.  What actions can I take to prevent falls?  General information  Use good lighting in all rooms. Make sure to:  Replace any light bulbs that burn out.  Turn on lights if it is dark and use night-lights.  Keep items that you use often in easy-to-reach places. Lower the shelves around your home if needed.  Move furniture so that there are clear paths around it.  Do not keep throw rugs or other things on the floor that can make you trip.  If any of your floors are uneven, fix them.  Add color or contrast paint or tape to clearly isaías and help you see:  Grab bars or handrails.  First and last steps of staircases.  Where the edge of each step is.  If you use a ladder or stepladder:  Make sure that it is fully opened. Do not climb a closed ladder.  Make sure the sides of the ladder are locked in  place.  Have someone hold the ladder while you use it.  Know where your pets are as you move through your home.  What can I do in the bathroom?         Keep the floor dry. Clean up any water that is on the floor right away.  Remove soap buildup in the bathtub or shower. Buildup makes bathtubs and showers slippery.  Use non-skid mats or decals on the floor of the bathtub or shower.  Attach bath mats securely with double-sided, non-slip rug tape.  If you need to sit down while you are in the shower, use a non-slip stool.  Install grab bars by the toilet and in the bathtub and shower. Do not use towel bars as grab bars.  What can I do in the bedroom?  Make sure that you have a light by your bed that is easy to reach.  Do not use any sheets or blankets on your bed that hang to the floor.  Have a firm bench or chair with side arms that you can use for support when you get dressed.  What can I do in the kitchen?  Clean up any spills right away.  If you need to reach something above you, use a sturdy step stool that has a grab bar.  Keep electrical cables out of the way.  Do not use floor polish or wax that makes floors slippery.  What can I do with my stairs?  Do not leave anything on the stairs.  Make sure that you have a light switch at the top and the bottom of the stairs. Have them installed if you do not have them.  Make sure that there are handrails on both sides of the stairs. Fix handrails that are broken or loose. Make sure that handrails are as long as the staircases.  Install non-slip stair treads on all stairs in your home if they do not have carpet.  Avoid having throw rugs at the top or bottom of stairs, or secure the rugs with carpet tape to prevent them from moving.  Choose a carpet design that does not hide the edge of steps on the stairs. Make sure that carpet is firmly attached to the stairs. Fix any carpet that is loose or worn.  What can I do on the outside of my home?  Use bright outdoor  lighting.  Repair the edges of walkways and driveways and fix any cracks. Clear paths of anything that can make you trip, such as tools or rocks.  Add color or contrast paint or tape to clearly isaías and help you see high doorway thresholds.  Trim any bushes or trees on the main path into your home.  Check that handrails are securely fastened and in good repair. Both sides of all steps should have handrails.  Install guardrails along the edges of any raised decks or porches.  Have leaves, snow, and ice cleared regularly. Use sand, salt, or ice melt on walkways during winter months if you live where there is ice and snow.  In the garage, clean up any spills right away, including grease or oil spills.  What other actions can I take?  Review your medicines with your health care provider. Some medicines can make you confused or feel dizzy. This can increase your chance of falling.  Wear closed-toe shoes that fit well and support your feet. Wear shoes that have rubber soles and low heels.  Use a cane, walker, scooter, or crutches that help you move around if needed.  Talk with your provider about other ways that you can decrease your risk of falls. This may include seeing a physical therapist to learn to do exercises to improve movement and strength.  Where to find more information  Centers for Disease Control and [place]: cdc.gov  National [place] on Aging: uche.nih.gov  National [place] on Aging: uche.nih.gov  Contact a health care provider if:  You are afraid of falling at home.  You feel weak, drowsy, or dizzy at home.  You fall at home.  Get help right away if you:  Lose consciousness or have trouble moving after a fall.  Have a fall that causes a head injury.  These symptoms may be an emergency. Get help right away. Call 911.  Do not wait to see if the symptoms will go away.  Do not drive yourself to the hospital.  This information is not intended to replace advice given to you by your health care  provider. Make sure you discuss any questions you have with your health care provider.  Document Revised: 08/21/2023 Document Reviewed: 08/21/2023  Elsevier Patient Education © 2024 [employer]    Sit-to-Stand Exercise    The sit-to-stand exercise (also known as the chair stand or chair rise exercise) strengthens your lower body and helps you maintain or improve your mobility and independence. The end goal is to do the sit-to-stand exercise without using your hands. This will be easier as you become stronger. You should always talk with your health care provider before starting any exercise program, especially if you have had recent surgery.  Do the exercise exactly as told by your health care provider and adjust it as directed. It is normal to feel mild stretching, pulling, tightness, or discomfort as you do this exercise, but you should stop right away if you feel sudden pain or your pain gets worse. Do not begin doing this exercise until told by your health care provider.  What the sit-to-stand exercise does  The sit-to-stand exercise helps to strengthen the muscles in your thighs and the muscles in the center of your body that give you stability (core muscles). This exercise is especially helpful if:  You have had knee or hip surgery.  You have trouble getting up from a chair, out of a car, or off the toilet due to muscle weakness.  How to do the sit-to-stand exercise  Sit toward the front edge of a sturdy chair without armrests. Your knees should be bent and your feet should be flat on the floor and shoulder-width apart and underneath your hips.  Place your hands lightly on each side of the seat. Keep your back and neck as straight as possible, with your chest slightly forward.  Breathe in slowly. Lean forward and slightly shift your weight to the front of your feet.  Breathe out as you slowly stand up. Try not to support any weight with your hands.  Stand and pause for a full breath in and out.  Breathe in  as you sit down slowly. Tighten your core and abdominal muscles to control your lowering as much as possible. You should lower yourself back to the chair slowly, not just drop back into the seat.  Breathe out slowly.  Do this exercise 10-15 times. If needed, do it fewer times until you build up strength.  Rest for 1 minute, then do another set of 10-15 repetitions.  To change the difficulty of the sit-to-stand exercise  If the exercise is too difficult, use a chair with sturdy armrests, and push off the armrests to help you come to the standing position. You can also use the armrests to help slowly lower yourself back to sitting. As this gets easier, try to use your arms less. You can also place a firm cushion or pillow on the chair to make the surface higher.  If this exercise is too easy, do not use your arms to help raise or lower yourself. You can also wear a weighted vest, use hand weights, increase your repetitions, or try a lower chair.  General tips  You may feel tired when starting an exercise routine. This is normal.  You may have muscle soreness that lasts a few days. This is normal. As you get stronger, you may not feel muscle soreness.  Use smooth, steady movements.  Do not  hold your breath during strength exercises. This can cause unsafe changes in your blood pressure.  Breathe in slowly through your nose, and breathe out slowly through your mouth.  Summary  Strengthening your lower body is an important step to help you move safely and independently.  The sit-to-stand exercise helps strengthen the muscles in your thighs and core.  You should always talk with your health care provider before starting any exercise program, especially if you have had recent surgery.  This information is not intended to replace advice given to you by your health care provider. Make sure you discuss any questions you have with your health care provider.  Document Revised: 04/10/2022 Document Reviewed: 04/10/2022  [place]  Patient Education 2024 Elsevier Inc.      Medicare Wellness  Personal Prevention Plan of Service     Date of Office Visit:    Encounter Provider:  Luis Shields DO  Place of Service:  [facility]  Patient Name: Anderson Goode  :  1955    As part of the Medicare Wellness portion of your visit today, we are providing you with this personalized preventive plan of services (PPPS). This plan is based upon recommendations of the [place] Preventive Services Task Force (USPSTF) and the Advisory Committee on Immunization Practices (ACIP).    This lists the preventive care services that should be considered, and provides dates of when you are due. Items listed as completed are up-to-date and do not require any further intervention.    Health Maintenance   Topic Date Due   • RSV Vaccine - Adults (1 - 1-dose 60+ series) Never done   • ANNUAL WELLNESS VISIT  2024   • LIPID PANEL  2024   • Pneumococcal Vaccine 65+ (1 of 2 - PCV) 2024 (Originally 1961)   • TDAP/TD VACCINES (2 - Td or Tdap) 2024 (Originally 2020)   • ZOSTER VACCINE (2 of 3) 2025 (Originally 2015)   • COLORECTAL CANCER SCREENING  2029   • HEPATITIS C SCREENING  Completed   • COVID-19 Vaccine  Completed   • AAA SCREEN (ONE-TIME)  Completed   • INFLUENZA VACCINE  Discontinued       No orders of the defined types were placed in this encounter.      No follow-ups on file.

## 2024-06-03 NOTE — PROGRESS NOTES
The ABCs of the Annual Wellness Visit  Subsequent Medicare Wellness Visit    Subjective    Anderson Goode is a 68 y.o. male who presents for a Subsequent Medicare Wellness Visit.    The following portions of the patient's history were reviewed and   updated as appropriate: allergies, current medications, past family history, past medical history, past social history, past surgical history, and problem list.    Compared to one year ago, the patient feels his physical   health is the same.    Compared to one year ago, the patient feels his mental   health is the same.    Recent Hospitalizations:  He was not admitted to the hospital during the last year.       Current Medical Providers:  Patient Care Team:  Luis Shields DO as PCP - General (Family Medicine)  Slava Bassett MD as Consulting Physician (Otolaryngology)  Trish Dan MD as Consulting Physician (Otolaryngology)  Danilo Doe MD as Consulting Physician (Urology)    Outpatient Medications Prior to Visit   Medication Sig Dispense Refill    Ascorbic Acid (VITAMIN C) 500 MG capsule Take  by mouth daily.      aspirin 325 MG tablet Take 1 tablet by mouth Every Night.      atorvastatin (LIPITOR) 80 MG tablet Take 1 tablet by mouth every night at bedtime. 90 tablet 3    Cetirizine HCl (ZyrTEC Allergy) 10 MG capsule Take 10 mg by mouth Daily. 90 capsule 3    Cholecalciferol (VITAMIN D) 1000 UNITS tablet Take 1 capsule by mouth.      Glucosamine HCl 1500 MG tablet Take 1,500 mg by mouth daily. 30 each     hypromellose (NATURES TEARS) 0.4 % solution Apply  to eye.      ipratropium (ATROVENT) 0.03 % nasal spray 1 spray into the nostril(s) as directed by provider 2 (Two) Times a Day As Needed for Rhinitis. 30 mL 11    Multiple Vitamins-Minerals (CENTRUM ADULTS PO) Take  by mouth daily.      finasteride (PROSCAR) 5 MG tablet Take 1 tablet by mouth Daily. 90 tablet 3    tamsulosin (FLOMAX) 0.4 MG capsule 24 hr capsule TAKE ONE CAPSULE  "BY MOUTH DAILY 90 capsule 3     No facility-administered medications prior to visit.       No opioid medication identified on active medication list. I have reviewed chart for other potential  high risk medication/s and harmful drug interactions in the elderly.        Aspirin is on active medication list. Aspirin use is indicated based on review of current medical condition/s. Pros and cons of this therapy have been discussed today. Benefits of this medication outweigh potential harm.  Patient has been encouraged to continue taking this medication.  .      Patient Active Problem List   Diagnosis    Gastroesophageal reflux disease    Atopic rhinitis    Generalized osteoarthritis    Hyperglycemia    Hyperlipidemia    Left anterior fascicular block    Prostatism    Chronic pain of both shoulders    Vitamin D deficiency    Preventative health care    Colon adenomas    RBBB    Diverticulosis    Urinary urgency    BPH with obstruction/lower urinary tract symptoms     Advance Care Planning   Advance Care Planning     Advance Directive is not on file.  ACP discussion was held with the patient during this visit. Patient does not have an advance directive, information provided.     Objective    Vitals:    06/03/24 1445   BP: 110/70   Pulse: 85   SpO2: 98%   Weight: 79.7 kg (175 lb 12.8 oz)   Height: 182.9 cm (72.01\")   PainSc: 0-No pain     Estimated body mass index is 23.84 kg/m² as calculated from the following:    Height as of this encounter: 182.9 cm (72.01\").    Weight as of this encounter: 79.7 kg (175 lb 12.8 oz).    BMI is within normal parameters. No other follow-up for BMI required.      Does the patient have evidence of cognitive impairment? No      Lab Results   Component Value Date    TRIG 44 06/05/2024    HDL 58 06/05/2024    LDL 50 06/05/2024    VLDL 11 06/05/2024    HGBA1C 6.10 (H) 06/05/2024        HEALTH RISK ASSESSMENT    Smoking Status:  Social History     Tobacco Use   Smoking Status Some Days    Types: " Electronic Cigarette   Smokeless Tobacco Never   Tobacco Comments    raised  tobacco for years     Alcohol Consumption:  Social History     Substance and Sexual Activity   Alcohol Use Yes    Alcohol/week: 7.0 standard drinks of alcohol    Types: 7 Cans of beer per week    Comment: wife smoked 2 PPD - 34 years     Fall Risk Screen:    [place] Fall Risk Assessment was completed, and patient is at MODERATE risk for falls. Assessment completed on:6/3/2024    Depression Screenin/3/2024     2:54 PM   PHQ-2/PHQ-9 Depression Screening   Little Interest or Pleasure in Doing Things 0-->not at all   Feeling Down, Depressed or Hopeless 0-->not at all   PHQ-9: Brief Depression Severity Measure Score 0       Health Habits and Functional and Cognitive Screenin/3/2024     2:52 PM   Functional & Cognitive Status   Do you have difficulty preparing food and eating? No   Do you have difficulty bathing yourself, getting dressed or grooming yourself? No   Do you have difficulty using the toilet? No   Do you have difficulty moving around from place to place? No   Do you have trouble with steps or getting out of a bed or a chair? No   Current Diet Well Balanced Diet   Dental Exam Up to date   Eye Exam Up to date   Exercise (times per week) 7 times per week   Current Exercises Include Walking;Yard Work        Exercise Comment walking the dog 1 mile twice a day   Do you need help using the phone?  No   Are you deaf or do you have serious difficulty hearing?  No   Do you need help to go to places out of walking distance? No   Do you need help shopping? No   Do you need help preparing meals?  No   Do you need help with housework?  No   Do you need help with laundry? No   Do you need help taking your medications? No   Do you need help managing money? No   Do you ever drive or ride in a car without wearing a seat belt? No   Have you felt unusual stress, anger or loneliness in the last month? Yes   Who do you live with? Alone    If you need help, do you have trouble finding someone available to you? No   Have you been bothered in the last four weeks by sexual problems? No   Do you have difficulty concentrating, remembering or making decisions? No       Age-appropriate Screening Schedule:  Refer to the list below for future screening recommendations based on patient's age, sex and/or medical conditions. Orders for these recommended tests are listed in the plan section. The patient has been provided with a written plan.    Health Maintenance   Topic Date Due    RSV Vaccine - Adults (1 - 1-dose 60+ series) Never done    ANNUAL WELLNESS VISIT  05/31/2024    Pneumococcal Vaccine 65+ (1 of 2 - PCV) 11/30/2024 (Originally 12/25/1961)    TDAP/TD VACCINES (2 - Td or Tdap) 11/30/2024 (Originally 5/11/2020)    ZOSTER VACCINE (2 of 3) 01/01/2025 (Originally 8/26/2015)    LIPID PANEL  06/05/2025    COLORECTAL CANCER SCREENING  06/13/2029    HEPATITIS C SCREENING  Completed    COVID-19 Vaccine  Completed    AAA SCREEN (ONE-TIME)  Completed    INFLUENZA VACCINE  Discontinued                  CMS Preventative Services Quick Reference  Risk Factors Identified During Encounter  Immunizations Discussed/Encouraged: RSV (Respiratory Syncytial Virus)  The above risks/problems have been discussed with the patient.  Pertinent information has been shared with the patient in the After Visit Summary.  An After Visit Summary and PPPS were made available to the patient.    Follow Up:   Next Medicare Wellness visit to be scheduled in 1 year.       Additional E&M Note during same encounter follows:  Patient has multiple medical problems which are significant and separately identifiable that require additional work above and beyond the Medicare Wellness Visit.      Chief Complaint  Medicare Wellness-subsequent (Weight has been going down /Would like to discuss getting a handy cap sticker )    Subjective        HPI  Anderson Goode is also being seen today for  "annual exam. Knees still bother him when walking. Declines referral.         Objective   Vital Signs:  /70   Pulse 85   Ht 182.9 cm (72.01\")   Wt 79.7 kg (175 lb 12.8 oz)   SpO2 98%   BMI 23.84 kg/m²     Physical Exam   Const: NAD, A&Ox4, Pleasant, Cooperative  Eyes: EOMI, no conjunctivitis  ENT: No nasal discharge present, neck supple  Cardiac: Regular rate and rhythm, no cyanosis  Resp: Respiratory rate within normal limits, no increased work of breathing, no audible wheezing or retractions noted  GI: No distention or ascites  MSK: Left knee in brace                 Assessment and Plan   Diagnoses and all orders for this visit:    1. Medicare annual wellness visit, subsequent (Primary)    2. Preventative health care    3. At moderate risk for fall      Problem List Items Addressed This Visit          Health Encounters    Preventative health care     Other Visit Diagnoses       Medicare annual wellness visit, subsequent    -  Primary    At moderate risk for fall                The wellness exam has been reviewed in detail.  The patient has been fully counseled on preventative guidelines for vaccines, cancer screenings, and other health maintenance needs.  Functional testing has been performed to assess capacity for independent living and need for other medical interventions.   The patient was counseled on maintaining a lifestyle to promote good health and to minimize chronic diseases.  The patient has been assisted with scheduling healthcare procedures for the coming year and given a written document outlining these recommendations.    Return in about 6 months (around 12/3/2024) for with A1c;.           Follow Up   Return in about 6 months (around 12/3/2024) for with A1c;.  Patient was given instructions and counseling regarding his condition or for health maintenance advice. Please see specific information pulled into the AVS if appropriate.           "

## 2024-06-05 ENCOUNTER — LAB (OUTPATIENT)
Dept: LAB | Facility: HOSPITAL | Age: 69
End: 2024-06-05
Payer: MEDICARE

## 2024-06-05 DIAGNOSIS — R73.9 HYPERGLYCEMIA: ICD-10-CM

## 2024-06-05 DIAGNOSIS — Z13.29 SCREENING FOR ENDOCRINE DISORDER: ICD-10-CM

## 2024-06-05 DIAGNOSIS — E78.00 PURE HYPERCHOLESTEROLEMIA: ICD-10-CM

## 2024-06-05 DIAGNOSIS — Z13.0 SCREENING FOR DEFICIENCY ANEMIA: ICD-10-CM

## 2024-06-05 DIAGNOSIS — R97.20 ELEVATED PSA: ICD-10-CM

## 2024-06-05 DIAGNOSIS — E55.9 VITAMIN D DEFICIENCY: ICD-10-CM

## 2024-06-05 LAB
25(OH)D3 SERPL-MCNC: 50.2 NG/ML (ref 30–100)
ALBUMIN SERPL-MCNC: 4 G/DL (ref 3.5–5.2)
ALBUMIN/GLOB SERPL: 1.1 G/DL
ALP SERPL-CCNC: 107 U/L (ref 39–117)
ALT SERPL W P-5'-P-CCNC: 27 U/L (ref 1–41)
ANION GAP SERPL CALCULATED.3IONS-SCNC: 9.3 MMOL/L (ref 5–15)
AST SERPL-CCNC: 26 U/L (ref 1–40)
BASOPHILS # BLD AUTO: 0.04 10*3/MM3 (ref 0–0.2)
BASOPHILS NFR BLD AUTO: 0.5 % (ref 0–1.5)
BILIRUB SERPL-MCNC: 0.8 MG/DL (ref 0–1.2)
BUN SERPL-MCNC: 11 MG/DL (ref 8–23)
BUN/CREAT SERPL: 14.5 (ref 7–25)
CALCIUM SPEC-SCNC: 9.1 MG/DL (ref 8.6–10.5)
CHLORIDE SERPL-SCNC: 99 MMOL/L (ref 98–107)
CHOLEST SERPL-MCNC: 119 MG/DL (ref 0–200)
CO2 SERPL-SCNC: 25.7 MMOL/L (ref 22–29)
CREAT SERPL-MCNC: 0.76 MG/DL (ref 0.76–1.27)
DEPRECATED RDW RBC AUTO: 42.2 FL (ref 37–54)
EGFRCR SERPLBLD CKD-EPI 2021: 97.3 ML/MIN/1.73
EOSINOPHIL # BLD AUTO: 0.08 10*3/MM3 (ref 0–0.4)
EOSINOPHIL NFR BLD AUTO: 0.9 % (ref 0.3–6.2)
ERYTHROCYTE [DISTWIDTH] IN BLOOD BY AUTOMATED COUNT: 12.7 % (ref 12.3–15.4)
GLOBULIN UR ELPH-MCNC: 3.5 GM/DL
GLUCOSE SERPL-MCNC: 102 MG/DL (ref 65–99)
HBA1C MFR BLD: 6.1 % (ref 4.8–5.6)
HCT VFR BLD AUTO: 40.1 % (ref 37.5–51)
HDLC SERPL-MCNC: 58 MG/DL (ref 40–60)
HGB BLD-MCNC: 13.4 G/DL (ref 13–17.7)
IMM GRANULOCYTES # BLD AUTO: 0.02 10*3/MM3 (ref 0–0.05)
IMM GRANULOCYTES NFR BLD AUTO: 0.2 % (ref 0–0.5)
LDLC SERPL CALC-MCNC: 50 MG/DL (ref 0–100)
LDLC/HDLC SERPL: 0.9 {RATIO}
LYMPHOCYTES # BLD AUTO: 1.07 10*3/MM3 (ref 0.7–3.1)
LYMPHOCYTES NFR BLD AUTO: 12.7 % (ref 19.6–45.3)
MCH RBC QN AUTO: 30.6 PG (ref 26.6–33)
MCHC RBC AUTO-ENTMCNC: 33.4 G/DL (ref 31.5–35.7)
MCV RBC AUTO: 91.6 FL (ref 79–97)
MONOCYTES # BLD AUTO: 0.72 10*3/MM3 (ref 0.1–0.9)
MONOCYTES NFR BLD AUTO: 8.5 % (ref 5–12)
NEUTROPHILS NFR BLD AUTO: 6.5 10*3/MM3 (ref 1.7–7)
NEUTROPHILS NFR BLD AUTO: 77.2 % (ref 42.7–76)
NRBC BLD AUTO-RTO: 0 /100 WBC (ref 0–0.2)
PLATELET # BLD AUTO: 258 10*3/MM3 (ref 140–450)
PMV BLD AUTO: 8.9 FL (ref 6–12)
POTASSIUM SERPL-SCNC: 4.7 MMOL/L (ref 3.5–5.2)
PROT SERPL-MCNC: 7.5 G/DL (ref 6–8.5)
RBC # BLD AUTO: 4.38 10*6/MM3 (ref 4.14–5.8)
SODIUM SERPL-SCNC: 134 MMOL/L (ref 136–145)
TRIGL SERPL-MCNC: 44 MG/DL (ref 0–150)
TSH SERPL DL<=0.05 MIU/L-ACNC: 1.94 UIU/ML (ref 0.27–4.2)
VLDLC SERPL-MCNC: 11 MG/DL (ref 5–40)
WBC NRBC COR # BLD AUTO: 8.43 10*3/MM3 (ref 3.4–10.8)

## 2024-06-05 PROCEDURE — 80053 COMPREHEN METABOLIC PANEL: CPT

## 2024-06-05 PROCEDURE — 36415 COLL VENOUS BLD VENIPUNCTURE: CPT

## 2024-06-05 PROCEDURE — 84153 ASSAY OF PSA TOTAL: CPT

## 2024-06-05 PROCEDURE — 84443 ASSAY THYROID STIM HORMONE: CPT

## 2024-06-05 PROCEDURE — 84154 ASSAY OF PSA FREE: CPT

## 2024-06-05 PROCEDURE — 82306 VITAMIN D 25 HYDROXY: CPT

## 2024-06-05 PROCEDURE — 80061 LIPID PANEL: CPT

## 2024-06-05 PROCEDURE — 83036 HEMOGLOBIN GLYCOSYLATED A1C: CPT

## 2024-06-05 PROCEDURE — 85025 COMPLETE CBC W/AUTO DIFF WBC: CPT

## 2024-06-06 LAB
PSA FREE MFR SERPL: 13.2 %
PSA FREE SERPL-MCNC: 0.49 NG/ML
PSA SERPL-MCNC: 3.7 NG/ML (ref 0–4)

## 2024-06-13 ENCOUNTER — TELEPHONE (OUTPATIENT)
Dept: FAMILY MEDICINE CLINIC | Facility: CLINIC | Age: 69
End: 2024-06-13
Payer: MEDICARE

## 2024-06-13 ENCOUNTER — OFFICE VISIT (OUTPATIENT)
Dept: UROLOGY | Facility: CLINIC | Age: 69
End: 2024-06-13
Payer: MEDICARE

## 2024-06-13 VITALS — DIASTOLIC BLOOD PRESSURE: 70 MMHG | OXYGEN SATURATION: 96 % | SYSTOLIC BLOOD PRESSURE: 119 MMHG | HEART RATE: 73 BPM

## 2024-06-13 DIAGNOSIS — R39.15 URINARY URGENCY: Primary | ICD-10-CM

## 2024-06-13 DIAGNOSIS — N13.8 BPH WITH OBSTRUCTION/LOWER URINARY TRACT SYMPTOMS: ICD-10-CM

## 2024-06-13 DIAGNOSIS — N40.1 BPH WITH OBSTRUCTION/LOWER URINARY TRACT SYMPTOMS: ICD-10-CM

## 2024-06-13 DIAGNOSIS — R97.20 ELEVATED PSA: ICD-10-CM

## 2024-06-13 RX ORDER — TAMSULOSIN HYDROCHLORIDE 0.4 MG/1
1 CAPSULE ORAL DAILY
Qty: 90 CAPSULE | Refills: 3 | Status: SHIPPED | OUTPATIENT
Start: 2024-06-13

## 2024-06-13 RX ORDER — FINASTERIDE 5 MG/1
5 TABLET, FILM COATED ORAL DAILY
Qty: 90 TABLET | Refills: 3 | Status: SHIPPED | OUTPATIENT
Start: 2024-06-13

## 2024-06-13 NOTE — TELEPHONE ENCOUNTER
Caller: Anderson Goode    Relationship: Self    Best call back number: [phone]      What test was performed: BLOOD WORK     When was the test performed: 06/05/2024    Additional notes: THE PATIENT WOULD LIKE TO KNOW IF THE RESULTS ARE IN YET

## 2024-06-13 NOTE — PROGRESS NOTES
Follow Up Office Visit      Patient Name: Anderson Goode  : 1955   MRN: [id number]     Chief Complaint:    Chief Complaint   Patient presents with    BPH with obstruction/lower urinary tract symptoms       Referring Provider: No ref. provider found    History of Present Illness: Anderson Goode is a 68 y.o. male who presents today for follow up of elevated PSA, BPH with lower urinary tract symptoms.  He has a 70+ gram prostate with ongoing LUTS.  His primary concern is urgency and frequency.  He has previous trialed Vesicare but discontinued secondary constipation, trialed Myrbetriq but discontinued secondary to hypertension.  IPSS 11 with a mixed quality of life, PVR 0 male.  Appears to be emptying well.  PSA has stabilized.  Has been stable for the last 3 years.    Lab Results   Component Value Date    PSA 3.7 2024    PSA 4.320 (H) 2023    PSA 3.530 2022    PSA 3.450 2021    PSA 2.230 2020    PSA 0.970 2017     Interested in alternative options for his bladder urgency.    Subjective      Review of System: Review of Systems   Genitourinary:  Positive for urgency.      I have reviewed the ROS documented by my clinical staff, I have updated appropriately and I agree. Danilo Doe MD    I have reviewed and the following portions of the patient's history were updated as appropriate: past family history, past medical history, past social history, past surgical history and problem list.    Medications:     Current Outpatient Medications:     Ascorbic Acid (VITAMIN C) 500 MG capsule, Take  by mouth daily., Disp: , Rfl:     aspirin 325 MG tablet, Take 1 tablet by mouth Every Night., Disp: , Rfl:     atorvastatin (LIPITOR) 80 MG tablet, Take 1 tablet by mouth every night at bedtime., Disp: 90 tablet, Rfl: 3    Cetirizine HCl (ZyrTEC Allergy) 10 MG capsule, Take 10 mg by mouth Daily., Disp: 90 capsule, Rfl: 3    Cholecalciferol (VITAMIN D) 1000 UNITS tablet,  Take 1 capsule by mouth., Disp: , Rfl:     finasteride (PROSCAR) 5 MG tablet, Take 1 tablet by mouth Daily., Disp: 90 tablet, Rfl: 3    Glucosamine HCl 1500 MG tablet, Take 1,500 mg by mouth daily., Disp: 30 each, Rfl:     hypromellose (NATURES TEARS) 0.4 % solution, Apply  to eye., Disp: , Rfl:     ipratropium (ATROVENT) 0.03 % nasal spray, 1 spray into the nostril(s) as directed by provider 2 (Two) Times a Day As Needed for Rhinitis., Disp: 30 mL, Rfl: 11    Multiple Vitamins-Minerals (CENTRUM ADULTS PO), Take  by mouth daily., Disp: , Rfl:     tamsulosin (FLOMAX) 0.4 MG capsule 24 hr capsule, Take 1 capsule by mouth Daily., Disp: 90 capsule, Rfl: 3    Vibegron 75 MG tablet, Take 1 tablet by mouth Daily., Disp: 90 tablet, Rfl: 3    Allergies:   Allergies   Allergen Reactions    Cephalexin Rash    Omeprazole GI Intolerance    Myrbetriq [Mirabegron] Other (See Comments)     Hypertension         IPSS Questionnaire (AUA-7):  Over the past month…    1)  Incomplete Emptying:       How often have you had a sensation of not emptying you had the sensation of not emptying your bladder completely after you finished urinating?  0 - Not at all   2)  Frequency:       How often have you had the urinate again less than two hours after you finished urinating?  1 - Less than 1 time in 5   3)  Intermittency:       How often have you found you stopped and started again several times when you urinated?   5 - Almost always   4) Urgency:      How often have you found it difficult to postpone urination?  3 - About half the time   5) Weak Stream:      How often have you had a weak urinary stream?  0 - Not at all   6) Straining:       How often have you had to push or strain to begin urination?  0 - Not at all   7) Nocturia:      How many times did you most typically get up to urinate from the time you went to bed at night until the time you got up in the morning?  2 - 2 times   Total Score:  11   The International Prostate Symptom Score  (IPSS) is used to screen, diagnose, track symptoms of benign prostatic hyperplasia (BPH).   0-7 (Mild Symptoms) 8-19 (Moderate) 20-35 (Severe)   Quality of Life (QoL):  If you were to spend the rest of your life with your urinary condition just the way it is now, how would you feel about that? 3-Mixed   Urine Leakage (Incontinence) 0-No Leakage     Post void residual bladder scan:   0 mL    Objective     Physical Exam:   Vital Signs:   Vitals:    06/13/24 1334   BP: 119/70   Pulse: 73   SpO2: 96%     There is no height or weight on file to calculate BMI.     Physical Exam  Constitutional:       Appearance: Normal appearance.   HENT:      Head: Normocephalic and atraumatic.      Nose: Nose normal.   Eyes:      Extraocular Movements: Extraocular movements intact.      Conjunctiva/sclera: Conjunctivae normal.      Pupils: Pupils are equal, round, and reactive to light.   Musculoskeletal:         General: Normal range of motion.      Cervical back: Normal range of motion and neck supple.   Skin:     General: Skin is warm and dry.      Findings: No lesion or rash.   Neurological:      General: No focal deficit present.      Mental Status: He is alert and oriented to person, place, and time. Mental status is at baseline.   Psychiatric:         Mood and Affect: Mood normal.         Behavior: Behavior normal.         Labs:   Brief Urine Lab Results       None                 Lab Results   Component Value Date    GLUCOSE 102 (H) 06/05/2024    CALCIUM 9.1 06/05/2024     (L) 06/05/2024    K 4.7 06/05/2024    CO2 25.7 06/05/2024    CL 99 06/05/2024    BUN 11 06/05/2024    CREATININE 0.76 06/05/2024    EGFRIFNONA 98 02/17/2021    BCR 14.5 06/05/2024    ANIONGAP 9.3 06/05/2024       Lab Results   Component Value Date    WBC 8.43 06/05/2024    HGB 13.4 06/05/2024    HCT 40.1 06/05/2024    MCV 91.6 06/05/2024     06/05/2024       Images:   No Images in the past 120 days found..    Measures:   Tobacco:   Anderson Vines  Russel  reports that he has been smoking electronic cigarette. He has never used smokeless tobacco.       Assessment / Plan      Assessment/Plan:   68 y.o. male who presented today for follow up of BPH, LUTS, urgency.  Continue tamsulosin and finasteride.  PSA stable recheck in 1 year.  Discussed options for urgency symptom management.  Gemtesa has no side effects of hypertension, do similar to Myrbetriq.  Would avoid anticholinergic given his baseline constipation concerns.  He is willing to try Gemtesa, he will message me if any cost concerns.    Diagnoses and all orders for this visit:    1. Urinary urgency (Primary)  -     Vibegron 75 MG tablet; Take 1 tablet by mouth Daily.  Dispense: 90 tablet; Refill: 3  -     tamsulosin (FLOMAX) 0.4 MG capsule 24 hr capsule; Take 1 capsule by mouth Daily.  Dispense: 90 capsule; Refill: 3    2. BPH with obstruction/lower urinary tract symptoms  -     PSA Total+% Free (Serial); Future  -     tamsulosin (FLOMAX) 0.4 MG capsule 24 hr capsule; Take 1 capsule by mouth Daily.  Dispense: 90 capsule; Refill: 3  -     finasteride (PROSCAR) 5 MG tablet; Take 1 tablet by mouth Daily.  Dispense: 90 tablet; Refill: 3    3. Elevated PSA  -     PSA Total+% Free (Serial); Future           Follow Up:   Return in about 1 year (around 6/13/2025).    I spent approximately 30 minutes providing clinical care for this patient; including review of patient's chart and provider documentation, face to face time spent with patient in examination room (obtaining history, performing physical exam, discussing diagnosis and management options), placing orders, and completing patient documentation.     Danilo Doe MD  [facility] [place]

## 2024-06-14 NOTE — TELEPHONE ENCOUNTER
His labs look good, although his A1c did get a little worse up to 6.1%.  His sodium is very slightly low, but this should normalize on its own.  Otherwise his labs looked good.

## 2024-06-14 NOTE — TELEPHONE ENCOUNTER
Left message for Anderson Goode  to return my call.    Hub may relay message and document    Luis Shields DO  [facility] Clinical Pool8 minutes ago (4:12 PM)       His labs look good, although his A1c did get a little worse up to 6.1%.  His sodium is very slightly low, but this should normalize on its own.  Otherwise his labs looked good.

## 2024-06-24 NOTE — TELEPHONE ENCOUNTER
Name: Anderson Goode      Relationship: Self      Best Callback Number: [phone]       HUB PROVIDED THE RELAY MESSAGE FROM THE OFFICE      PATIENT: VOICED UNDERSTANDING AND HAS NO FURTHER QUESTIONS AT THIS TIME    ADDITIONAL INFORMATION:

## 2024-07-26 DIAGNOSIS — E78.00 PURE HYPERCHOLESTEROLEMIA: ICD-10-CM

## 2024-07-26 RX ORDER — ATORVASTATIN CALCIUM 80 MG/1
80 TABLET, FILM COATED ORAL
Qty: 90 TABLET | Refills: 3 | Status: SHIPPED | OUTPATIENT
Start: 2024-07-26

## 2024-07-26 NOTE — TELEPHONE ENCOUNTER
Rx Refill Note  Requested Prescriptions     Pending Prescriptions Disp Refills    atorvastatin (LIPITOR) 80 MG tablet [Pharmacy Med Name: ATORVASTATIN 80 MG TABLET] 90 tablet 3     Sig: TAKE ONE TABLET BY MOUTH EVERY NIGHT AT BEDTIME      Last office visit with prescribing clinician: 6/3/2024   Last telemedicine visit with prescribing clinician: Visit date not found   Next office visit with prescribing clinician: 12/3/2024       Lali Ellington MA  07/26/24, 09:22 EDT

## 2024-08-01 RX ORDER — IPRATROPIUM BROMIDE 21 UG/1
SPRAY, METERED NASAL
Qty: 30 ML | Refills: 11 | Status: SHIPPED | OUTPATIENT
Start: 2024-08-01

## 2024-08-01 NOTE — TELEPHONE ENCOUNTER
Rx Refill Note  Requested Prescriptions     Pending Prescriptions Disp Refills    ipratropium (ATROVENT) 0.03 % nasal spray [Pharmacy Med Name: IPRATROPIUM 0.03% SPRAY] 30 mL 11     Sig: SPRAY ONE SPRAY INTO THE NOSTRIL(S) TWO TIMES A DAY AS NEEDED FOR RHINITIS      Last office visit with prescribing clinician: 6/3/2024   Last telemedicine visit with prescribing clinician: Visit date not found   Next office visit with prescribing clinician: 12/3/2024       Lali Ellington MA  08/01/24, 10:29 EDT

## 2024-12-03 ENCOUNTER — OFFICE VISIT (OUTPATIENT)
Dept: FAMILY MEDICINE CLINIC | Facility: CLINIC | Age: 69
End: 2024-12-03
Payer: MEDICARE

## 2024-12-03 VITALS
WEIGHT: 184.4 LBS | SYSTOLIC BLOOD PRESSURE: 118 MMHG | HEART RATE: 72 BPM | OXYGEN SATURATION: 98 % | BODY MASS INDEX: 24.98 KG/M2 | DIASTOLIC BLOOD PRESSURE: 74 MMHG | HEIGHT: 72 IN

## 2024-12-03 DIAGNOSIS — R73.9 HYPERGLYCEMIA: Primary | ICD-10-CM

## 2024-12-03 LAB
EXPIRATION DATE: ABNORMAL
HBA1C MFR BLD: 5.8 % (ref 4.5–5.7)
Lab: ABNORMAL

## 2024-12-03 PROCEDURE — 3044F HG A1C LEVEL LT 7.0%: CPT | Performed by: FAMILY MEDICINE

## 2024-12-03 PROCEDURE — 83036 HEMOGLOBIN GLYCOSYLATED A1C: CPT | Performed by: FAMILY MEDICINE

## 2024-12-03 PROCEDURE — 1126F AMNT PAIN NOTED NONE PRSNT: CPT | Performed by: FAMILY MEDICINE

## 2024-12-03 PROCEDURE — 99213 OFFICE O/P EST LOW 20 MIN: CPT | Performed by: FAMILY MEDICINE

## 2024-12-03 NOTE — PROGRESS NOTES
Established Patient Office Visit      Patient Name: Anderson Goode  : 1955   MRN: [id number]   Care Team: Patient Care Team:  Luis Shields DO as PCP - General (Family Medicine)  Slava Bassett MD as Consulting Physician (Otolaryngology)  Trish Dan MD as Consulting Physician (Otolaryngology)  Danilo Doe MD as Consulting Physician (Urology)    Chief Complaint:    Chief Complaint   Patient presents with    Hyperglycemia       History of Present Illness: Anderson Goode is a 68 y.o. male who is here today for chief complaint.    HPI    He is here today for follow-up on mild hyperglycemia.  His A1c had been pretty well-controlled at 5.7% for the last few years with just a mild fasting hyperglycemia diet controlled, however at his last visit in  for his wellness his A1c had spiked up to 6.1%.  Discussed diet and increasing activity and plan to repeat it today.    This patient is accompanied by their self who contributes to the history of their care.    The following portions of the patient's history were reviewed and updated as appropriate: allergies, current medications, past family history, past medical history, past social history, past surgical history and problem list.    Subjective      Review of Systems:   Review of Systems - See HPI    Past Medical History:   Past Medical History:   Diagnosis Date    Allergic 1960    Allergic rhinitis     Benign prostatic hyperplasia     Chronic low back pain 1988    Injury from fall - History epidural injections ×7    Clotting disorder 1976    Scare tissue/polyps,hemorrhoids    Colon adenomas 2016    Colon polyp 1980s    Diverticulosis 2016    Elbow fracture, right 1964    Generalized osteoarthritis 2005    GERD (gastroesophageal reflux disease) Adulthood    Moderate recurring stiffness and achiness    History of recurrent UTIs 2010    Hyperlipidemia Adulthood    Long-term tight control on Lipitor    Obesity 2013     Weight 216 BMI 32    Prediabetes 2014    Prostatism 2010    RBBB     Vitamin D deficiency        Past Surgical History:   Past Surgical History:   Procedure Laterality Date    APPENDECTOMY  1964    CARDIOVASCULAR STRESS TEST  2014    Nuclear stress test normal    COLONOSCOPY  s    COLONOSCOPY W/ POLYPECTOMY      Benign adenomas    EYE SURGERY  2000s    FRACTURE SURGERY  ,,    Two  bones  in ankle and small leg bone    HEMORRHOIDECTOMY  ,  x 3     1990s with banding    LIFT / REPAIR BROW PTOSIS FOREHEAD Bilateral 2010    LUMBAR EPIDURAL INJECTION  1988    Injections ×7 after back injury from fall    TONSILLECTOMY  1961    WRIST FRACTURE SURGERY Right 2017    Open reduction internal fixation       Family History:   Family History   Problem Relation Age of Onset    Atrial fibrillation Mother     Arthritis Mother     Breast cancer Mother     Heart attack Father          age 84    Other Father         MRSA    Pneumonia Father     Hyperlipidemia Father     Obesity Sister     Esophagitis Sister     Obesity Brother     Drug abuse Other         Overdose    Dementia Other     Lumbar disc disease Other         Chronic pain syndrome    Cancer Maternal Grandmother        Social History:   Social History     Socioeconomic History    Marital status: Other   Tobacco Use    Smoking status: Some Days     Types: Electronic Cigarette     Passive exposure: Never    Smokeless tobacco: Never    Tobacco comments:     raised  tobacco for years   Vaping Use    Vaping status: Never Used   Substance and Sexual Activity    Alcohol use: Yes     Alcohol/week: 7.0 standard drinks of alcohol     Types: 7 Cans of beer per week     Comment: wife smoked 2 PPD - 34 years    Drug use: Not Currently     Types: Marijuana    Sexual activity: Not Currently       Tobacco History:   Social History     Tobacco Use   Smoking Status Some Days    Types: Electronic Cigarette    Passive exposure: Never   Smokeless Tobacco Never  "  Tobacco Comments    raised  tobacco for years       Medications:   Outpatient Medications Prior to Visit   Medication Sig Dispense Refill    Ascorbic Acid (VITAMIN C) 500 MG capsule Take  by mouth daily.      aspirin 325 MG tablet Take 1 tablet by mouth Every Night.      atorvastatin (LIPITOR) 80 MG tablet TAKE ONE TABLET BY MOUTH EVERY NIGHT AT BEDTIME 90 tablet 3    Cetirizine HCl (ZyrTEC Allergy) 10 MG capsule Take 10 mg by mouth Daily. 90 capsule 3    Cholecalciferol (VITAMIN D) 1000 UNITS tablet Take 1 capsule by mouth.      finasteride (PROSCAR) 5 MG tablet Take 1 tablet by mouth Daily. 90 tablet 3    Glucosamine HCl 1500 MG tablet Take 1,500 mg by mouth daily. 30 each     hypromellose (NATURES TEARS) 0.4 % solution Apply  to eye.      ipratropium (ATROVENT) 0.03 % nasal spray SPRAY ONE SPRAY INTO THE NOSTRIL(S) TWO TIMES A DAY AS NEEDED FOR RHINITIS 30 mL 11    Multiple Vitamins-Minerals (CENTRUM ADULTS PO) Take  by mouth daily.      tamsulosin (FLOMAX) 0.4 MG capsule 24 hr capsule Take 1 capsule by mouth Daily. 90 capsule 3    Vibegron 75 MG tablet Take 1 tablet by mouth Daily. 90 tablet 3     No facility-administered medications prior to visit.        Allergies:   Allergies   Allergen Reactions    Cephalexin Rash    Omeprazole GI Intolerance    Myrbetriq [Mirabegron] Other (See Comments)     Hypertension         Objective   Objective     Physical Exam:  Vital Signs:   Vitals:    12/03/24 1426   BP: 118/74   Pulse: 72   SpO2: 98%   Weight: 83.6 kg (184 lb 6.4 oz)   Height: 182.9 cm (72.01\")   PainSc: 0-No pain     Body mass index is 25 kg/m².     Physical Exam  Nursing note reviewed  Const: NAD, A&Ox4, Pleasant, Cooperative  Eyes: EOMI, no conjunctivitis  ENT: No nasal discharge present, neck supple  Cardiac: Regular rate and rhythm, no cyanosis  Resp: Respiratory rate within normal limits, no increased work of breathing, no audible wheezing or retractions noted  GI: No distention or ascites  MSK: Motor " and sensation grossly intact in bilateral upper extremities  Neurologic: CN II-XII grossly intact  Psych: Appropriate mood and behavior.  Skin: Warm, dry  Procedures/Radiology     Procedures  No radiology results for the last 7 days     Assessment & Plan   Assessment / Plan      Assessment/Plan:   Problems Addressed This Visit  Diagnoses and all orders for this visit:    1. Hyperglycemia (Primary)  Assessment & Plan:  Peaked at 6.1% in June 2024, down to 5.8% today doing better    Orders:  -     POC Glycosylated Hemoglobin (Hb A1C)      Problem List Items Addressed This Visit          Endocrine and Metabolic    Hyperglycemia - Primary    Overview     Diet controlled         Current Assessment & Plan     Peaked at 6.1% in June 2024, down to 5.8% today doing better         Relevant Orders    POC Glycosylated Hemoglobin (Hb A1C) (Completed)     Orders Placed This Encounter   Procedures    POC Glycosylated Hemoglobin (Hb A1C)     Order Specific Question:   Release to patient     Answer:   Routine Release [[id number]]           Patient Instructions   A1c today 5.8%, improved from 6.1% in June    Follow Up:   Return in about 6 months (around 6/3/2025) for Medicare Wellness.      DO JONNA Stevenson RD  [facility]  [address] [facility] [address]  Fax [phone]  Phone [phone]    Disclaimer to patients: The 21st Century Cares Act makes medical notes like these available to patients in the interest of transparency. However, please be advised that this is still a medical document. It is intended as [id number] communication. Many sections may include medical language or jargon, abbreviations, and additional verbiage that are unfamiliar or confusing. In some ways it may come across as blunt, direct, or may be summarized in order to clearly and concisely communicate the most crucial information to medical professionals. It may also include  mentions of conditions that are unlikely but considered as part of the differential diagnosis, including serious disorders. These are not always discussed at length at the time of appointment because their likelihood is so low, but may be included in a medical note to make it clear what has been considered and/or ruled out as part of a work-up. Medical documents are intended to carry relevant information, facts as evident, and the personal clinical opinion of the physician. If you have any questions regarding this medical document, please bring them to the attention of the physician at your next scheduled appointment.

## 2025-01-09 DIAGNOSIS — R39.15 URINARY URGENCY: ICD-10-CM

## 2025-01-09 NOTE — TELEPHONE ENCOUNTER
Rx Refill Note  Requested Prescriptions     Pending Prescriptions Disp Refills    Vibegron 75 MG tablet 90 tablet 3     Sig: Take 1 tablet by mouth Daily.      Last office visit with prescribing clinician: 6/13/2024   Last telemedicine visit with prescribing clinician: Visit date not found   Next office visit with prescribing clinician: 6/19/2025       Emmanuelle Kaplan  01/09/25, 16:09 EST

## 2025-01-14 ENCOUNTER — TELEPHONE (OUTPATIENT)
Dept: UROLOGY | Facility: CLINIC | Age: 70
End: 2025-01-14

## 2025-01-14 DIAGNOSIS — N32.81 OVERACTIVE BLADDER: Primary | ICD-10-CM

## 2025-01-14 RX ORDER — TOLTERODINE 4 MG/1
4 CAPSULE, EXTENDED RELEASE ORAL DAILY
Qty: 30 CAPSULE | Refills: 3 | Status: SHIPPED | OUTPATIENT
Start: 2025-01-14

## 2025-01-14 NOTE — TELEPHONE ENCOUNTER
Caller: MASHA GILLIAM    Relationship: SELF    Best call back number: [phone]    What is the best time to reach you: ANY    Who are you requesting to speak with (clinical staff, provider,  specific staff member): DR ARTEAGA    Do you know the name of the person who called: PT CALLED    What was the call regarding: PT STATED TRYING TO GET GEMTESA BUT HAS GONE FROM $92 TO $195 FOR A 1 MONTH SCRIPT. PT STATES WAS TOLD THERE WAS ANOTHER OPTION AVAILABLE. PLEASE CALL PT TO DISCUSS. THANK YOU.

## 2025-01-29 DIAGNOSIS — N32.81 OVERACTIVE BLADDER: ICD-10-CM

## 2025-01-29 RX ORDER — TOLTERODINE 4 MG/1
4 CAPSULE, EXTENDED RELEASE ORAL DAILY
Qty: 90 CAPSULE | Refills: 3 | Status: SHIPPED | OUTPATIENT
Start: 2025-01-29

## 2025-01-29 NOTE — TELEPHONE ENCOUNTER
Called and spoke with patient. He states he was able to  the new medication, and he stated its a lot cheaper, and has helped with his urgency symptoms. Patient states he has seen improvements since starting the Detrol. Informed the patient that is great news, and to reach out to our office if  anything changes or if he needs us.  Patient verbally states he understands.

## 2025-04-17 ENCOUNTER — HOSPITAL ENCOUNTER (OUTPATIENT)
Dept: GENERAL RADIOLOGY | Facility: HOSPITAL | Age: 70
Discharge: HOME | End: 2025-04-17
Admitting: FAMILY MEDICINE
Payer: MEDICARE

## 2025-04-17 ENCOUNTER — OFFICE VISIT (OUTPATIENT)
Dept: FAMILY MEDICINE CLINIC | Facility: CLINIC | Age: 70
End: 2025-04-17
Payer: MEDICARE

## 2025-04-17 VITALS
BODY MASS INDEX: 23.86 KG/M2 | HEIGHT: 72 IN | SYSTOLIC BLOOD PRESSURE: 118 MMHG | DIASTOLIC BLOOD PRESSURE: 78 MMHG | OXYGEN SATURATION: 97 % | WEIGHT: 176.2 LBS | HEART RATE: 82 BPM

## 2025-04-17 DIAGNOSIS — M25.511 ACUTE PAIN OF RIGHT SHOULDER: ICD-10-CM

## 2025-04-17 DIAGNOSIS — M25.551 CHRONIC PAIN OF BOTH HIPS: ICD-10-CM

## 2025-04-17 DIAGNOSIS — R10.2 PERINEAL PAIN IN MALE: Primary | ICD-10-CM

## 2025-04-17 DIAGNOSIS — M25.552 CHRONIC PAIN OF BOTH HIPS: ICD-10-CM

## 2025-04-17 DIAGNOSIS — G89.29 CHRONIC PAIN OF BOTH HIPS: ICD-10-CM

## 2025-04-17 DIAGNOSIS — S46.001A INJURY OF RIGHT ROTATOR CUFF, INITIAL ENCOUNTER: ICD-10-CM

## 2025-04-17 DIAGNOSIS — R10.2 PERINEAL PAIN IN MALE: ICD-10-CM

## 2025-04-17 PROCEDURE — 73521 X-RAY EXAM HIPS BI 2 VIEWS: CPT

## 2025-04-17 PROCEDURE — 73030 X-RAY EXAM OF SHOULDER: CPT

## 2025-04-17 PROCEDURE — 72220 X-RAY EXAM SACRUM TAILBONE: CPT

## 2025-04-17 NOTE — PROGRESS NOTES
"Established Patient Office Visit      Patient Name: Anderson Goode  : 1955   MRN: [id number]   Care Team: Patient Care Team:  Luis Shields DO as PCP - General (Family Medicine)  Slava Bassett MD as Consulting Physician (Otolaryngology)  Trish Dan MD as Consulting Physician (Otolaryngology)  Danilo Doe MD as Consulting Physician (Urology)    Chief Complaint:    Chief Complaint   Patient presents with    Hip Pain     Pt states it feels like there is swelling between his hips and they are trying to go out of socket. He feels like his prostate may be swelling, he needs a walker to aid him at home. He states he can not put pressure on his right leg       History of Present Illness: Anderson Goode is a 69 y.o. male who is here today for chief complaint.    HPI    Feels like his has swelling in the perineal area about once per year that \"pushes his hips apart\" happened the 2 days after steve this past year after doing a lot of steps ofver that time. Happened again in January and February, for 2 days and then 4 days respectively.    Had some bleeding from polyps Fall , had follow up with Dr. Hendrix and had some polyps and hemorrhoids removed. Has not had any bleeding now for about 14 months.    This patient is accompanied by their self who contributes to the history of their care.    The following portions of the patient's history were reviewed and updated as appropriate: allergies, current medications, past family history, past medical history, past social history, past surgical history and problem list.    Subjective      Review of Systems:   Review of Systems - See HPI    Past Medical History:   Past Medical History:   Diagnosis Date    Allergic     Allergic rhinitis     Benign prostatic hyperplasia     Chronic low back pain     Injury from fall - History epidural injections ×7    Clotting disorder 1976    Scare tissue/polyps,hemorrhoids    Colon " adenomas 2016    Colon polyp 1980s    Diverticulosis 2016    Elbow fracture, right 1964    Generalized osteoarthritis 2005    GERD (gastroesophageal reflux disease) Adulthood    Moderate recurring stiffness and achiness    History of recurrent UTIs 2010    Hyperlipidemia Adulthood    Long-term tight control on Lipitor    Obesity 2013    Weight 216 BMI 32    Prediabetes 2014    Prostatism 2010    RBBB     Vitamin D deficiency        Past Surgical History:   Past Surgical History:   Procedure Laterality Date    APPENDECTOMY  1964    CARDIOVASCULAR STRESS TEST  2014    Nuclear stress test normal    COLONOSCOPY  2000s    COLONOSCOPY W/ POLYPECTOMY      Benign adenomas    EYE SURGERY  2000s    FRACTURE SURGERY  ,,    Two  bones  in ankle and small leg bone    HEMORRHOIDECTOMY  ,  x 3      with banding    LIFT / REPAIR BROW PTOSIS FOREHEAD Bilateral 2010    LUMBAR EPIDURAL INJECTION  1988    Injections ×7 after back injury from fall    TONSILLECTOMY  1961    WRIST FRACTURE SURGERY Right 2017    Open reduction internal fixation       Family History:   Family History   Problem Relation Age of Onset    Atrial fibrillation Mother     Arthritis Mother     Breast cancer Mother     Heart attack Father          age 84    Other Father         MRSA    Pneumonia Father     Hyperlipidemia Father     Obesity Sister     Esophagitis Sister     Obesity Brother     Drug abuse Other         Overdose    Dementia Other     Lumbar disc disease Other         Chronic pain syndrome    Cancer Maternal Grandmother        Social History:   Social History     Socioeconomic History    Marital status:    Tobacco Use    Smoking status: Some Days     Types: Electronic Cigarette     Passive exposure: Never    Smokeless tobacco: Never    Tobacco comments:     raised  tobacco for years   Vaping Use    Vaping status: Never Used   Substance and Sexual Activity    Alcohol use: Yes     Alcohol/week: 7.0 standard  "drinks of alcohol     Types: 7 Cans of beer per week     Comment: wife smoked 2 PPD - 34 years    Drug use: Not Currently     Types: Marijuana    Sexual activity: Not Currently       Tobacco History:   Social History     Tobacco Use   Smoking Status Some Days    Types: Electronic Cigarette    Passive exposure: Never   Smokeless Tobacco Never   Tobacco Comments    raised  tobacco for years       Medications:   Outpatient Medications Prior to Visit   Medication Sig Dispense Refill    Ascorbic Acid (VITAMIN C) 500 MG capsule Take  by mouth daily.      aspirin 325 MG tablet Take 1 tablet by mouth Every Night.      atorvastatin (LIPITOR) 80 MG tablet TAKE ONE TABLET BY MOUTH EVERY NIGHT AT BEDTIME 90 tablet 3    Cetirizine HCl (ZyrTEC Allergy) 10 MG capsule Take 10 mg by mouth Daily. 90 capsule 3    Cholecalciferol (VITAMIN D) 1000 UNITS tablet Take 1 capsule by mouth.      finasteride (PROSCAR) 5 MG tablet Take 1 tablet by mouth Daily. 90 tablet 3    Glucosamine HCl 1500 MG tablet Take 1,500 mg by mouth daily. 30 each     hypromellose (NATURES TEARS) 0.4 % solution Apply  to eye.      ipratropium (ATROVENT) 0.03 % nasal spray SPRAY ONE SPRAY INTO THE NOSTRIL(S) TWO TIMES A DAY AS NEEDED FOR RHINITIS 30 mL 11    Multiple Vitamins-Minerals (CENTRUM ADULTS PO) Take  by mouth daily.      tamsulosin (FLOMAX) 0.4 MG capsule 24 hr capsule Take 1 capsule by mouth Daily. 90 capsule 3    tolterodine LA (DETROL LA) 4 MG 24 hr capsule Take 1 capsule by mouth Daily. 90 capsule 3     No facility-administered medications prior to visit.        Allergies:   Allergies   Allergen Reactions    Cephalexin Rash    Omeprazole GI Intolerance    Myrbetriq [Mirabegron] Other (See Comments)     Hypertension         Objective   Objective     Physical Exam:  Vital Signs:   Vitals:    04/17/25 1403   BP: 118/78   Pulse: 82   SpO2: 97%   Weight: 79.9 kg (176 lb 3.2 oz)   Height: 182.9 cm (72.01\")     Body mass index is 23.89 kg/m².     Physical " Exam  Nursing note reviewed  Const: NAD, A&Ox4, Pleasant, Cooperative  Eyes: EOMI, no conjunctivitis  ENT: No nasal discharge present, neck supple  Cardiac: Regular rate and rhythm, no cyanosis  Resp: Respiratory rate within normal limits, no increased work of breathing, no audible wheezing or retractions noted  GI: No distention or ascites  MSK: Motor and sensation grossly intact in bilateral upper extremities  Neurologic: CN II-XII grossly intact  Psych: Appropriate mood and behavior.  Skin: Warm, dry  Procedures/Radiology     Procedures  No radiology results for the last 7 days     Assessment & Plan   Assessment / Plan      Assessment/Plan:   Problems Addressed This Visit  Diagnoses and all orders for this visit:    1. Perineal pain in male (Primary)  -     CT Pelvis Without Contrast; Future  -     XR Sacrum & Coccyx; Future    2. Chronic pain of both hips  -     XR Hips Bilateral With or Without Pelvis 2 View; Future    3. Injury of right rotator cuff, initial encounter  Comments:  Traumatic injury in January. Likely PT  Orders:  -     XR Shoulder 2+ View Right; Future  -     Ambulatory Referral to Physical Therapy for Evaluation & Treatment    4. Acute pain of right shoulder  -     XR Shoulder 2+ View Right; Future  -     Ambulatory Referral to Physical Therapy for Evaluation & Treatment      Problem List Items Addressed This Visit    None  Visit Diagnoses         Perineal pain in male    -  Primary    Relevant Orders    CT Pelvis Without Contrast    XR Sacrum & Coccyx (Completed)      Chronic pain of both hips        Relevant Orders    XR Hips Bilateral With or Without Pelvis 2 View (Completed)      Injury of right rotator cuff, initial encounter        Traumatic injury in January. Likely PT    Relevant Orders    XR Shoulder 2+ View Right (Completed)    Ambulatory Referral to Physical Therapy for Evaluation & Treatment      Acute pain of right shoulder        Relevant Orders    XR Shoulder 2+ View Right  (Completed)    Ambulatory Referral to Physical Therapy for Evaluation & Treatment            There are no Patient Instructions on file for this visit.    Follow Up:   No follow-ups on file.      DO JONNA Stevenson RD  [facility]  [address]  [facility] [phone]  Fax [phone]  Phone [phone]    Disclaimer to patients: The 21st Century Cares Act makes medical notes like these available to patients in the interest of transparency. However, please be advised that this is still a medical document. It is intended as [id number] communication. Many sections may include medical language or jargon, abbreviations, and additional verbiage that are unfamiliar or confusing. In some ways it may come across as blunt, direct, or may be summarized in order to clearly and concisely communicate the most crucial information to medical professionals. It may also include mentions of conditions that are unlikely but considered as part of the differential diagnosis, including serious disorders. These are not always discussed at length at the time of appointment because their likelihood is so low, but may be included in a medical note to make it clear what has been considered and/or ruled out as part of a work-up. Medical documents are intended to carry relevant information, facts as evident, and the personal clinical opinion of the physician. If you have any questions regarding this medical document, please bring them to the attention of the physician at your next scheduled appointment.

## 2025-05-02 ENCOUNTER — HOSPITAL ENCOUNTER (OUTPATIENT)
Dept: CT IMAGING | Facility: HOSPITAL | Age: 70
Discharge: HOME | End: 2025-05-02
Payer: MEDICARE

## 2025-05-02 DIAGNOSIS — R10.2 PERINEAL PAIN IN MALE: ICD-10-CM

## 2025-05-02 PROCEDURE — 72192 CT PELVIS W/O DYE: CPT

## 2025-05-06 ENCOUNTER — TELEPHONE (OUTPATIENT)
Dept: FAMILY MEDICINE CLINIC | Facility: CLINIC | Age: 70
End: 2025-05-06

## 2025-05-06 NOTE — TELEPHONE ENCOUNTER
Caller: Anderson Goode    Relationship: Self    Best call back number: [phone]     Caller requesting test results: SELF    What test was performed: CT SCAN AND X-RAYS    When was the test performed: CT SCAN DONE 5.2.25 AND X-RAYS DONE 4.17.25    Where was the test performed: [facility]

## 2025-05-15 ENCOUNTER — TREATMENT (OUTPATIENT)
Dept: PHYSICAL THERAPY | Facility: CLINIC | Age: 70
End: 2025-05-15
Payer: MEDICARE

## 2025-05-15 ENCOUNTER — TELEPHONE (OUTPATIENT)
Dept: FAMILY MEDICINE CLINIC | Facility: CLINIC | Age: 70
End: 2025-05-15
Payer: MEDICARE

## 2025-05-15 DIAGNOSIS — S46.001A INJURY OF RIGHT ROTATOR CUFF, INITIAL ENCOUNTER: Primary | ICD-10-CM

## 2025-05-15 DIAGNOSIS — M25.511 ACUTE PAIN OF RIGHT SHOULDER: ICD-10-CM

## 2025-05-15 DIAGNOSIS — R29.898 WEAKNESS OF RIGHT SHOULDER: ICD-10-CM

## 2025-05-15 DIAGNOSIS — G89.29 CHRONIC RIGHT SHOULDER PAIN: Primary | ICD-10-CM

## 2025-05-15 DIAGNOSIS — S46.001A INJURY OF RIGHT ROTATOR CUFF, INITIAL ENCOUNTER: ICD-10-CM

## 2025-05-15 DIAGNOSIS — M25.511 CHRONIC RIGHT SHOULDER PAIN: Primary | ICD-10-CM

## 2025-05-15 NOTE — PROGRESS NOTES
Physical Therapy Initial Evaluation and Plan of Care    [facility] [place]  [address]  [place] [id number] [phone]    Patient: Anderson Goode   : 1955  Diagnosis/ICD-10 Code:  No primary diagnosis found.  Referring practitioner: Luis Shields DO    Subjective Evaluation    History of Present Illness  Date of onset: 1/15/2025    Subjective comment: Fell backward when getting out of his truck.  His dog ran after another dog behind him and fell.  No pain, but difficulty using his right arm.  Noticing improvement in right shoulder mobility.  Is able to move his elbow up to 90 deg and his hand on his head.  No longer having sleep disturbance.  Patient Occupation: Retired Quality of life: excellent    Pain  Exacerbated by: Abduction > 90 deg.  Progression: improved (50% improvement)    Social Support  Lives with: alone    Hand dominance: right    Patient Goals  Patient goals for therapy: increased strength           *SPADI:  Pain->20; Disability->10    Objective          Active Range of Motion     Right Shoulder   Flexion: 80 degrees   Abduction: 90 degrees   External rotation 0°: 0 degrees   Internal rotation BTB: L1     Strength/Myotome Testing     Right Shoulder     Planes of Motion   Flexion: 3-   External rotation at 0°: 3-     Tests     Right Shoulder   Positive drop arm, empty can, external rotation lag sign and horn blower.   Negative belly press, internal rotation lag sign and lift-off.           Assessment & Plan       Assessment  Impairments: abnormal or restricted ROM, activity intolerance, impaired physical strength and pain with function   Functional limitations: reaching overhead and unable to perform repetitive tasks (Grooming  )  Assessment details: Mr. Goode is a 69 year old RHD male that presents to physical therapy with chronic right shoulder weakness and pain s/p fall in mid January.  His dog ran after another dog behind  him pulling him onto the ground backward.  Has had weakness more than pain.  Has (+) ER and flx weakness/lag signs.  Unable to generate force against resistance in ER and forward elevation.  Recommend Mr. Goode pursue further imaging to visualize the rotator cuff.    Prognosis: poor    Goals  Plan Goals: STGs:  1.)  Have 120 deg of active forward elevation in 6 weeks.  2.)  Be able to perform grooming without pain or weakness in 6 weeks.  LTGs:  1.)  Have 40 deg of active right shoulder external rotation in 8 weeks.  2.)  Be able to reach to shelves overhead without pain or weakness in 12 weeks.    Plan  Therapy options: will be seen for skilled therapy services  Planned modality interventions: thermotherapy (hydrocollator packs)  Planned therapy interventions: therapeutic activities, stretching, strengthening, manual therapy, functional ROM exercises and home exercise program  Frequency: 1x week  Duration in visits: 12  Duration in weeks: 12  Treatment plan discussed with: patient        Manual Therapy:         mins  65473;  Therapeutic Exercise:    15     mins  49523;     Neuromuscular Luis Antonio:        mins  79624;    Therapeutic Activity:     9     mins  89357;     Gait Training:           mins  43974;     Ultrasound:          mins  93078;    Electrical Stimulation:         mins  92339 ( );  Dry Needling          mins self-pay    Timed Treatment:   24   mins   Total Treatment:     40   mins    PT SIGNATURE: Wicho Lange, PT   DATE TREATMENT INITIATED: 5/15/2025    Initial Certification  Certification Period: 8/13/2025  I certify that the therapy services are furnished while this patient is under my care.  The services outlined above are required by this patient, and will be reviewed every 90 days.     PHYSICIAN: Luis Shields DO  NPI: [id number]                                      DATE:    Please sign and return via fax to [phone].. Thank you, [facility].

## 2025-05-15 NOTE — TELEPHONE ENCOUNTER
Caller: Anderson Goode    Relationship: Self    Best call back number: [phone]    What orders are you requesting (i.e. lab or imaging): MRI ON RIGHT SHOULDER       Additional notes: THE PATIENT STATES THAT HIS PHYSICAL THERAPIST ADVISED HIM TO ASK THE DOCTOR TO ORDER AN MRI FOR HIS SHOULDER

## 2025-06-16 ENCOUNTER — LAB (OUTPATIENT)
Dept: LAB | Facility: HOSPITAL | Age: 70
End: 2025-06-16
Payer: MEDICARE

## 2025-06-16 DIAGNOSIS — Z13.89 SCREENING FOR BLOOD OR PROTEIN IN URINE: ICD-10-CM

## 2025-06-16 DIAGNOSIS — Z12.5 SCREENING PSA (PROSTATE SPECIFIC ANTIGEN): ICD-10-CM

## 2025-06-16 DIAGNOSIS — Z13.220 SCREENING FOR HYPERLIPIDEMIA: Primary | ICD-10-CM

## 2025-06-16 DIAGNOSIS — Z13.29 SCREENING FOR ENDOCRINE DISORDER: ICD-10-CM

## 2025-06-16 DIAGNOSIS — Z13.220 SCREENING FOR HYPERLIPIDEMIA: ICD-10-CM

## 2025-06-16 DIAGNOSIS — Z13.0 SCREENING FOR DEFICIENCY ANEMIA: ICD-10-CM

## 2025-06-16 DIAGNOSIS — Z00.00 PREVENTATIVE HEALTH CARE: ICD-10-CM

## 2025-06-16 LAB
ALBUMIN SERPL-MCNC: 4.1 G/DL (ref 3.5–5.2)
ALBUMIN/GLOB SERPL: 1.2 G/DL
ALP SERPL-CCNC: 82 U/L (ref 39–117)
ALT SERPL W P-5'-P-CCNC: 26 U/L (ref 1–41)
ANION GAP SERPL CALCULATED.3IONS-SCNC: 8.4 MMOL/L (ref 5–15)
AST SERPL-CCNC: 28 U/L (ref 1–40)
BASOPHILS # BLD AUTO: 0.05 10*3/MM3 (ref 0–0.2)
BASOPHILS NFR BLD AUTO: 0.7 % (ref 0–1.5)
BILIRUB SERPL-MCNC: 0.9 MG/DL (ref 0–1.2)
BILIRUB UR QL STRIP: NEGATIVE
BUN SERPL-MCNC: 11 MG/DL (ref 8–23)
BUN/CREAT SERPL: 15.5 (ref 7–25)
CALCIUM SPEC-SCNC: 9.4 MG/DL (ref 8.6–10.5)
CHLORIDE SERPL-SCNC: 98 MMOL/L (ref 98–107)
CHOLEST SERPL-MCNC: 112 MG/DL (ref 0–200)
CLARITY UR: CLEAR
CO2 SERPL-SCNC: 26.6 MMOL/L (ref 22–29)
COLOR UR: YELLOW
CREAT SERPL-MCNC: 0.71 MG/DL (ref 0.76–1.27)
DEPRECATED RDW RBC AUTO: 44 FL (ref 37–54)
EGFRCR SERPLBLD CKD-EPI 2021: 98.7 ML/MIN/1.73
EOSINOPHIL # BLD AUTO: 0.1 10*3/MM3 (ref 0–0.4)
EOSINOPHIL NFR BLD AUTO: 1.3 % (ref 0.3–6.2)
ERYTHROCYTE [DISTWIDTH] IN BLOOD BY AUTOMATED COUNT: 12.7 % (ref 12.3–15.4)
GLOBULIN UR ELPH-MCNC: 3.5 GM/DL
GLUCOSE SERPL-MCNC: 99 MG/DL (ref 65–99)
GLUCOSE UR STRIP-MCNC: NEGATIVE MG/DL
HBA1C MFR BLD: 5.9 % (ref 4.8–5.6)
HCT VFR BLD AUTO: 40.9 % (ref 37.5–51)
HDLC SERPL-MCNC: 57 MG/DL (ref 40–60)
HGB BLD-MCNC: 13.8 G/DL (ref 13–17.7)
HGB UR QL STRIP.AUTO: NEGATIVE
IMM GRANULOCYTES # BLD AUTO: 0.02 10*3/MM3 (ref 0–0.05)
IMM GRANULOCYTES NFR BLD AUTO: 0.3 % (ref 0–0.5)
KETONES UR QL STRIP: NEGATIVE
LDLC SERPL CALC-MCNC: 44 MG/DL (ref 0–100)
LDLC/HDLC SERPL: 0.8 {RATIO}
LEUKOCYTE ESTERASE UR QL STRIP.AUTO: NEGATIVE
LYMPHOCYTES # BLD AUTO: 1.14 10*3/MM3 (ref 0.7–3.1)
LYMPHOCYTES NFR BLD AUTO: 15.2 % (ref 19.6–45.3)
MCH RBC QN AUTO: 31.9 PG (ref 26.6–33)
MCHC RBC AUTO-ENTMCNC: 33.7 G/DL (ref 31.5–35.7)
MCV RBC AUTO: 94.5 FL (ref 79–97)
MONOCYTES # BLD AUTO: 0.61 10*3/MM3 (ref 0.1–0.9)
MONOCYTES NFR BLD AUTO: 8.1 % (ref 5–12)
NEUTROPHILS NFR BLD AUTO: 5.57 10*3/MM3 (ref 1.7–7)
NEUTROPHILS NFR BLD AUTO: 74.4 % (ref 42.7–76)
NITRITE UR QL STRIP: NEGATIVE
NRBC BLD AUTO-RTO: 0 /100 WBC (ref 0–0.2)
PH UR STRIP.AUTO: 7 [PH] (ref 5–8)
PLATELET # BLD AUTO: 232 10*3/MM3 (ref 140–450)
PMV BLD AUTO: 9.4 FL (ref 6–12)
POTASSIUM SERPL-SCNC: 4.7 MMOL/L (ref 3.5–5.2)
PROT SERPL-MCNC: 7.6 G/DL (ref 6–8.5)
PROT UR QL STRIP: NEGATIVE
RBC # BLD AUTO: 4.33 10*6/MM3 (ref 4.14–5.8)
SODIUM SERPL-SCNC: 133 MMOL/L (ref 136–145)
SP GR UR STRIP: 1.01 (ref 1–1.03)
TRIGL SERPL-MCNC: 46 MG/DL (ref 0–150)
TSH SERPL DL<=0.05 MIU/L-ACNC: 1.81 UIU/ML (ref 0.27–4.2)
UROBILINOGEN UR QL STRIP: NORMAL
VLDLC SERPL-MCNC: 11 MG/DL (ref 5–40)
WBC NRBC COR # BLD AUTO: 7.49 10*3/MM3 (ref 3.4–10.8)

## 2025-06-16 PROCEDURE — 83036 HEMOGLOBIN GLYCOSYLATED A1C: CPT

## 2025-06-16 PROCEDURE — 80061 LIPID PANEL: CPT

## 2025-06-16 PROCEDURE — 84443 ASSAY THYROID STIM HORMONE: CPT

## 2025-06-16 PROCEDURE — 80053 COMPREHEN METABOLIC PANEL: CPT

## 2025-06-16 PROCEDURE — G0103 PSA SCREENING: HCPCS

## 2025-06-16 PROCEDURE — 81003 URINALYSIS AUTO W/O SCOPE: CPT

## 2025-06-16 PROCEDURE — 85025 COMPLETE CBC W/AUTO DIFF WBC: CPT

## 2025-06-17 ENCOUNTER — OFFICE VISIT (OUTPATIENT)
Dept: FAMILY MEDICINE CLINIC | Facility: CLINIC | Age: 70
End: 2025-06-17
Payer: MEDICARE

## 2025-06-17 VITALS
OXYGEN SATURATION: 96 % | DIASTOLIC BLOOD PRESSURE: 76 MMHG | BODY MASS INDEX: 23.51 KG/M2 | HEART RATE: 81 BPM | SYSTOLIC BLOOD PRESSURE: 99 MMHG | HEIGHT: 72 IN | WEIGHT: 173.6 LBS

## 2025-06-17 DIAGNOSIS — E55.9 VITAMIN D DEFICIENCY: ICD-10-CM

## 2025-06-17 DIAGNOSIS — E78.00 PURE HYPERCHOLESTEROLEMIA: ICD-10-CM

## 2025-06-17 DIAGNOSIS — R73.9 HYPERGLYCEMIA: ICD-10-CM

## 2025-06-17 DIAGNOSIS — J30.2 SEASONAL ALLERGIC RHINITIS, UNSPECIFIED TRIGGER: ICD-10-CM

## 2025-06-17 DIAGNOSIS — Z00.00 MEDICARE ANNUAL WELLNESS VISIT, SUBSEQUENT: Primary | ICD-10-CM

## 2025-06-17 DIAGNOSIS — M16.0 PRIMARY OSTEOARTHRITIS OF BOTH HIPS: ICD-10-CM

## 2025-06-17 DIAGNOSIS — M51.370 DEGENERATION OF INTERVERTEBRAL DISC OF LUMBOSACRAL REGION WITH DISCOGENIC BACK PAIN: ICD-10-CM

## 2025-06-17 DIAGNOSIS — Z00.00 PREVENTATIVE HEALTH CARE: ICD-10-CM

## 2025-06-17 DIAGNOSIS — M25.859 HIP IMPINGEMENT SYNDROME, UNSPECIFIED LATERALITY: ICD-10-CM

## 2025-06-17 DIAGNOSIS — E87.1 HYPONATREMIA: ICD-10-CM

## 2025-06-17 DIAGNOSIS — M85.88 OSTEOPENIA OF LUMBAR SPINE: ICD-10-CM

## 2025-06-17 DIAGNOSIS — Z12.5 SCREENING PSA (PROSTATE SPECIFIC ANTIGEN): ICD-10-CM

## 2025-06-17 DIAGNOSIS — Z91.81 AT HIGH RISK FOR FALLS: ICD-10-CM

## 2025-06-17 LAB — PSA SERPL-MCNC: 2.15 NG/ML (ref 0–4)

## 2025-06-17 RX ORDER — ATORVASTATIN CALCIUM 80 MG/1
80 TABLET, FILM COATED ORAL
Qty: 90 TABLET | Refills: 3 | Status: SHIPPED | OUTPATIENT
Start: 2025-06-17

## 2025-06-17 NOTE — PROGRESS NOTES
Subjective   The ABCs of the Annual Wellness Visit  Medicare Wellness Visit      Anderson Goode is a 69 y.o. patient who presents for a Medicare Wellness Visit.    The following portions of the patient's history were reviewed and   updated as appropriate: allergies, current medications, past family history, past medical history, past social history, past surgical history, and problem list.    Compared to one year ago, the patient's physical   health is the same.  Compared to one year ago, the patient's mental   health is the same.    Recent Hospitalizations:  He was not admitted to the hospital during the last year.     Current Medical Providers:  Patient Care Team:  Luis Shields DO as PCP - General (Family Medicine)  Slava Bassett MD as Consulting Physician (Otolaryngology)  Trish Dan MD as Consulting Physician (Otolaryngology)  Danilo Doe MD as Consulting Physician (Urology)    Outpatient Medications Prior to Visit   Medication Sig Dispense Refill    aspirin 325 MG tablet Take 1 tablet by mouth Every Night.      Cetirizine HCl (ZyrTEC Allergy) 10 MG capsule Take 10 mg by mouth Daily. 90 capsule 3    finasteride (PROSCAR) 5 MG tablet Take 1 tablet by mouth Daily. 90 tablet 3    Glucosamine HCl 1500 MG tablet Take 1,500 mg by mouth daily. 30 each     hypromellose (NATURES TEARS) 0.4 % solution Apply  to eye.      ipratropium (ATROVENT) 0.03 % nasal spray SPRAY ONE SPRAY INTO THE NOSTRIL(S) TWO TIMES A DAY AS NEEDED FOR RHINITIS 30 mL 11    Multiple Vitamins-Minerals (CENTRUM ADULTS PO) Take  by mouth daily.      tamsulosin (FLOMAX) 0.4 MG capsule 24 hr capsule Take 1 capsule by mouth Daily. 90 capsule 3    tolterodine LA (DETROL LA) 4 MG 24 hr capsule Take 1 capsule by mouth Daily. 90 capsule 3    Ascorbic Acid (VITAMIN C) 500 MG capsule Take  by mouth daily.      atorvastatin (LIPITOR) 80 MG tablet TAKE ONE TABLET BY MOUTH EVERY NIGHT AT BEDTIME 90 tablet 3     "Cholecalciferol (VITAMIN D) 1000 UNITS tablet Take 1 capsule by mouth.       No facility-administered medications prior to visit.     No opioid medication identified on active medication list. I have reviewed chart for other potential  high risk medication/s and harmful drug interactions in the elderly.      Aspirin is on active medication list. Aspirin use is indicated based on review of current medical condition/s. Pros and cons of this therapy have been discussed today. Benefits of this medication outweigh potential harm.  Patient has been encouraged to continue taking this medication.  .      Patient Active Problem List   Diagnosis    Gastroesophageal reflux disease    Atopic rhinitis    Generalized osteoarthritis    Hyperglycemia    Hyperlipidemia    Left anterior fascicular block    Prostatism    Chronic pain of both shoulders    Vitamin D deficiency    Preventative health care    Colon adenomas    RBBB    Diverticulosis    Urinary urgency    BPH with obstruction/lower urinary tract symptoms    Osteopenia of lumbar spine    Primary osteoarthritis of both hips     Advance Care Planning Advance Directive is not on file.  ACP discussion was held with the patient during this visit. Patient has an advance directive (not in EMR), copy requested.            Objective   Vitals:    06/17/25 1437   BP: 99/76   Pulse: 81   SpO2: 96%   Weight: 78.7 kg (173 lb 9.6 oz)   Height: 182.9 cm (72.01\")   PainSc: 1        Estimated body mass index is 23.54 kg/m² as calculated from the following:    Height as of this encounter: 182.9 cm (72.01\").    Weight as of this encounter: 78.7 kg (173 lb 9.6 oz).    BMI is within normal parameters. No other follow-up for BMI required.           Does the patient have evidence of cognitive impairment? No  Lab Results   Component Value Date    TRIG 46 06/16/2025    HDL 57 06/16/2025    LDL 44 06/16/2025    VLDL 11 06/16/2025    HGBA1C 5.90 (H) 06/16/2025            ATTENTION  What is the year: " correct  What is the month of the year: correct  What is the day of the week?: correct  What is the date?: correct  MEMORY  Repeat address three times, only score third attempt: [address] [place], [place]: 7  HOW MANY ANIMALS DID THE PATIENT NAME  Verbal Fluency -- Animal Names (0-25): 14-16  CLOCK DRAWING  Clock Drawing: All Correct  MEMORY RECALL  Tell me what you remember about that name and address we were repeating at the beginnin  ACE TOTAL SCORE  Total ACE Score - <25/30 strongly suggests cognitive impairment; <21/30 almost certainly shows dementia: 25                                                                                      Health  Risk Assessment    Smoking Status:  Social History     Tobacco Use   Smoking Status Some Days    Types: Electronic Cigarette    Passive exposure: Never   Smokeless Tobacco Never   Tobacco Comments    raised  tobacco for years     Alcohol Consumption:  Social History     Substance and Sexual Activity   Alcohol Use Yes    Alcohol/week: 7.0 standard drinks of alcohol    Types: 7 Cans of beer per week    Comment: wife smoked 2 PPD - 34 years       Fall Risk Screen  AGUSTINA Fall Risk Assessment was completed, and patient is at HIGH risk for falls. Assessment completed on:2025    Depression Screening   Little interest or pleasure in doing things? Not at all   Feeling down, depressed, or hopeless? Not at all   PHQ-2 Total Score 0      Health Habits and Functional and Cognitive Screenin/17/2025     2:43 PM   Functional & Cognitive Status   Do you have difficulty preparing food and eating? No   Do you have difficulty bathing yourself, getting dressed or grooming yourself? No   Do you have difficulty using the toilet? No   Do you have difficulty moving around from place to place? No   Do you have trouble with steps or getting out of a bed or a chair? No   Current Diet Well Balanced Diet   Dental Exam Up to date   Eye Exam Up to date    Exercise (times per week) 4 times per week   Current Exercises Include Walking;Yard Work   Do you need help using the phone?  No   Are you deaf or do you have serious difficulty hearing?  No   Do you need help to go to places out of walking distance? No   Do you need help shopping? No   Do you need help preparing meals?  No   Do you need help with housework?  No   Do you need help with laundry? No   Do you need help taking your medications? No   Do you need help managing money? No   Do you ever drive or ride in a car without wearing a seat belt? No   Have you felt unusual fatigue (could be tiredness), stress, anger or loneliness in the last month? Yes    Who do you live with? Alone   If you need help, do you have trouble finding someone available to you? No   Have you been bothered in the last four weeks by sexual problems? No   Do you have difficulty concentrating, remembering or making decisions? No       Data saved with a previous flowsheet row definition           Age-appropriate Screening Schedule:  Refer to the list below for future screening recommendations based on patient's age, sex and/or medical conditions. Orders for these recommended tests are listed in the plan section. The patient has been provided with a written plan.    Health Maintenance List  Health Maintenance   Topic Date Due    Pneumococcal Vaccine 50+ (1 of 2 - PCV) Never done    ZOSTER VACCINE (2 of 3) 08/26/2015    ANNUAL WELLNESS VISIT  06/03/2025    TDAP/TD VACCINES (2 - Td or Tdap) 12/03/2025 (Originally 5/11/2020)    LIPID PANEL  06/16/2026    COLORECTAL CANCER SCREENING  06/13/2029    HEPATITIS C SCREENING  Completed    COVID-19 Vaccine  Completed    AAA SCREEN ONCE  Completed    INFLUENZA VACCINE  Discontinued    HEMOGLOBIN A1C  Discontinued    URINE MICROALBUMIN-CREATININE RATIO (uACR)  Discontinued                                                                                                                                                 CMS Preventative Services Quick Reference  Risk Factors Identified During Encounter  Immunizations Discussed/Encouraged: Prevnar 20 (Pneumococcal 20-valent conjugate) and Shingrix    The above risks/problems have been discussed with the patient.  Pertinent information has been shared with the patient in the After Visit Summary.  An After Visit Summary and PPPS were made available to the patient.    Follow Up:   Next Medicare Wellness visit to be scheduled in 1 year.         Additional E&M Note during same encounter follows:  Patient has additional, significant, and separately identifiable condition(s)/problem(s) that require work above and beyond the Medicare Wellness Visit     Chief Complaint  Medicare Wellness-subsequent (YEARLY) and Hip Pain (FOLLOW UP WITH RESULTS )    Subjective   HPI  Anderson is also being seen today for an annual adult preventative physical exam.     History of Present Illness  The patient is a 69-year-old male who presents for evaluation of perineal pain, osteopenia, and low sodium level.    He has been experiencing severe perineal pain in the evenings for the past three nights, necessitating the use of a walker for mobility, particularly to alleviate pressure on his right hip. The pain subsides upon morning movement. He reports no current swelling, a symptom he had previously associated with his hips being forced apart. He has a scheduled appointment with Dr. Escobar next week. In 12/2024, he experienced a two-day period of immobility following repeated stair climbing on [place] day. This was followed by a similar three-day episode in 01/2025, during which he relied on a power wheelchair for mobility. He describes a sensation of swelling between his hips, which he attributes to inflammation. He manages his pain with Advil or Tylenol as needed. He recalls an x-ray from 20 to 30 years ago revealing a hip abnormality, which was not bothersome at the time but was predicted to potentially  "cause issues in the future. He also mentions a leg fracture sustained three years ago.    He has reduced his salt intake due to a history of foot swelling. His daily alcohol consumption is limited to one beer. He has also decreased his intake of salty chips.    He has been supplementing with potassium tablets once a month, as advised by his sister, a nurse, due to leg pain.    He experiences occasional difficulty swallowing, particularly with vitamin C supplements. He maintains a high water intake of approximately 90 ounces daily.    He is currently taking glucosamine chondroitin with vitamin D3, vitamin C, and vitamin E supplements. He continues to take Zyrtec for allergies.    He had one session with Wicho for his shoulder, and Wicho thought he needed an MRI. He gave him some exercises to do.    SOCIAL HISTORY  The patient consumes one beer at night.                  Objective   Vital Signs:  BP 99/76   Pulse 81   Ht 182.9 cm (72.01\")   Wt 78.7 kg (173 lb 9.6 oz)   SpO2 96%   BMI 23.54 kg/m²   Physical Exam  Const: NAD, A&Ox4, Pleasant, Cooperative  Eyes: EOMI, no conjunctivitis  ENT: No nasal discharge present, neck supple  Cardiac: Regular rate and rhythm, no cyanosis  Resp: Respiratory rate within normal limits, no increased work of breathing, no audible wheezing or retractions noted  GI: No distention or ascites  MSK: Motor and sensation grossly intact in bilateral upper extremities  Neurologic: CN II-XII grossly intact  Psych: Appropriate mood and behavior.         Assessment and Plan         Medicare annual wellness visit, subsequent    Preventative health care    Screening PSA (prostate specific antigen)    Hyponatremia    Osteopenia of lumbar spine    Primary osteoarthritis of both hips    Degeneration of intervertebral disc of lumbosacral region with discogenic back pain    Hip impingement syndrome, unspecified laterality    Pure hypercholesterolemia     At high risk for " falls    Hyperglycemia    Vitamin D deficiency  Osteopenia on CT  Start Vit D 5000U daily  Seasonal allergic rhinitis, unspecified trigger    Diagnoses and all orders for this visit:    1. Medicare annual wellness visit, subsequent (Primary)    2. Preventative health care    3. Screening PSA (prostate specific antigen)  -     PSA Screen; Future    4. Hyponatremia  -     Basic Metabolic Panel; Future  -     Osmolality, Serum; Future  -     Sodium, Urine, Random - Urine, Clean Catch; Future  -     Osmolality, Urine - Urine, Clean Catch; Future  -     Creatinine Urine Random (kidney function) GFR component - Urine, Clean Catch; Future  -     ADH; Future    5. Osteopenia of lumbar spine  -     DEXA Bone Density Axial; Future    6. Primary osteoarthritis of both hips  -     Ambulatory Referral to Physical Therapy for Evaluation & Treatment    7. Degeneration of intervertebral disc of lumbosacral region with discogenic back pain  -     Ambulatory Referral to Physical Therapy for Evaluation & Treatment    8. Hip impingement syndrome, unspecified laterality  -     Ambulatory Referral to Physical Therapy for Evaluation & Treatment    9. Pure hypercholesterolemia  -     atorvastatin (LIPITOR) 80 MG tablet; Take 1 tablet by mouth every night at bedtime.  Dispense: 90 tablet; Refill: 3    10. At high risk for falls    11. Hyperglycemia    12. Vitamin D deficiency  Assessment & Plan:  Osteopenia on CT  Start Vit D 5000U daily      13. Seasonal allergic rhinitis, unspecified trigger       Problem List Items Addressed This Visit          Allergies and Adverse Reactions    Atopic rhinitis    Overview   Zyrtec PRN            Cardiac and Vasculature    Hyperlipidemia    Overview   Atorvastatin 80 mg, full-strength aspirin         Relevant Medications    atorvastatin (LIPITOR) 80 MG tablet       Endocrine and Metabolic    Hyperglycemia    Overview   Diet controlled         Vitamin D deficiency    Current Assessment & Plan   Osteopenia  on CT  Start Vit D 5000U daily            Health Encounters    Preventative health care       Musculoskeletal and Injuries    Osteopenia of lumbar spine    Relevant Orders    DEXA Bone Density Axial    Primary osteoarthritis of both hips    Relevant Orders    Ambulatory Referral to Physical Therapy for Evaluation & Treatment     Other Visit Diagnoses         Medicare annual wellness visit, subsequent    -  Primary      Screening PSA (prostate specific antigen)        Relevant Orders    PSA Screen (Completed)      Hyponatremia        Relevant Orders    Basic Metabolic Panel    Osmolality, Serum    Sodium, Urine, Random - Urine, Clean Catch    Osmolality, Urine - Urine, Clean Catch    Creatinine Urine Random (kidney function) GFR component - Urine, Clean Catch    ADH      Degeneration of intervertebral disc of lumbosacral region with discogenic back pain        Relevant Orders    Ambulatory Referral to Physical Therapy for Evaluation & Treatment      Hip impingement syndrome, unspecified laterality        Relevant Orders    Ambulatory Referral to Physical Therapy for Evaluation & Treatment      At high risk for falls              Assessment & Plan  1. Perineal pain.  - The perineal pain is likely due to hip arthritis, as evidenced by the CT scan showing arthritis in the lower back and hips.  - Pain worsens at night and improves with movement in the morning.  - Referral to an orthopedic specialist will be made for potential joint injection therapy.  - Referral to physical therapist Wicho will be placed to work on hip exercises.    2. Osteopenia.  - CT scan indicates low bone density, suggesting osteopenia.  - Increased risk of hip fractures.  - Bone density scan will be ordered to further evaluate the extent of osteopenia or potential osteoporosis.  - New vitamin D supplement will be prescribed to support bone health.    3. Low sodium level.  - Sodium level is slightly low, which could be due to low salt intake or  alcohol consumption.  - Advised to increase salt intake moderately and abstain from alcohol for two weeks.  - Recheck of sodium levels will be conducted in two weeks.    4. Prediabetes.  - A1c level is 5.9, indicating prediabetes.  - Advised to continue monitoring blood sugar levels and maintain a balanced diet to prevent progression to diabetes.    5. Medication management.  - Refill for Zyrtec will be sent to manage allergies.      The wellness exam has been reviewed in detail.  The patient has been fully counseled on preventative guidelines for vaccines, cancer screenings, and other health maintenance needs.  Functional testing has been performed to assess capacity for independent living and need for other medical interventions.   The patient was counseled on maintaining a lifestyle to promote good health and to minimize chronic diseases.  The patient has been assisted with scheduling healthcare procedures for the coming year and given a written document outlining these recommendations.    Return in about 6 months (around 12/17/2025) for prediabetes (with A1c).    New Medications Ordered This Visit   Medications    atorvastatin (LIPITOR) 80 MG tablet     Sig: Take 1 tablet by mouth every night at bedtime.     Dispense:  90 tablet     Refill:  3          Follow Up   Return in about 6 months (around 12/17/2025) for prediabetes (with A1c).  Patient was given instructions and counseling regarding his condition or for health maintenance advice. Please see specific information pulled into the AVS if appropriate.

## 2025-06-17 NOTE — PATIENT INSTRUCTIONS
Be a little more liberal with salt intake  Avoid alcohol for 2 weeks  Recheck sodium in 2 weeks  Start new vitamin D supplement  Stop Vitamin C        Advance Care Planning and Advance Directives     You make decisions on a daily basis - decisions about where you want to live, your career, your home, your life. Perhaps one of the most important decisions you face is your choice for future medical care. Take time to talk with your family and your healthcare team and start planning today.  Advance Care Planning is a process that can help you:  Understand possible future healthcare decisions in light of your own experiences  Reflect on those decision in light of your goals and values  Discuss your decisions with those closest to you and the healthcare professionals that care for you  Make a plan by creating a document that reflects your wishes    Surrogate Decision Maker  In the event of a medical emergency, which has left you unable to communicate or to make your own decisions, you would need someone to make decisions for you.  It is important to discuss your preferences for medical treatment with this person while you are in good health.     Qualities of a surrogate decision maker:  Willing to take on this role and responsibility  Knows what you want for future medical care  Willing to follow your wishes even if they don't agree with them  Able to make difficult medical decisions under stressful circumstances    Advance Directives  These are legal documents you can create that will guide your healthcare team and decision maker(s) when needed. These documents can be stored in the electronic medical record.    Living Will - a legal document to guide your care if you have a terminal condition or a serious illness and are unable to communicate. States vary by statute in document names/types, but most forms may include one or more of the following:        -  Directions regarding life-prolonging treatments        -   Directions regarding artificially provided nutrition/hydration        -  Choosing a healthcare decision maker        -  Direction regarding organ/tissue donation    Durable Power of  for Healthcare - this document names an -in-fact to make medical decisions for you, but it may also allow this person to make personal and financial decisions for you. Please seek the advice of an  if you need this type of document.    **Advance Directives are not required and no one may discriminate against you if you do not sign one.    Medical Orders  Many states allow specific forms/orders signed by your physician to record your wishes for medical treatment in your current state of health. This form, signed in personal communication with your physician, addresses resuscitation and other medical interventions that you may or may not want.      For more information or to schedule a time with a [facility] Advance Care Planning Facilitator contact: [facility] or call [phone] and someone will contact you directly.  Fall Prevention in the Home, Adult  Falls can cause injuries and affect people of all ages. There are many simple things that you can do to make your home safe and to help prevent falls.  If you need it, ask for help making these changes.  What actions can I take to prevent falls?  General information  Use good lighting in all rooms. Make sure to:  Replace any light bulbs that burn out.  Turn on lights if it is dark and use night-lights.  Keep items that you use often in easy-to-reach places. Lower the shelves around your home if needed.  Move furniture so that there are clear paths around it.  Do not keep throw rugs or other things on the floor that can make you trip.  If any of your floors are uneven, fix them.  Add color or contrast paint or tape to clearly isaías and help you see:  Grab bars or handrails.  First and last steps of staircases.  Where the edge of each step is.  If you  use a ladder or stepladder:  Make sure that it is fully opened. Do not climb a closed ladder.  Make sure the sides of the ladder are locked in place.  Have someone hold the ladder while you use it.  Know where your pets are as you move through your home.  What can I do in the bathroom?         Keep the floor dry. Clean up any water that is on the floor right away.  Remove soap buildup in the bathtub or shower. Buildup makes bathtubs and showers slippery.  Use non-skid mats or decals on the floor of the bathtub or shower.  Attach bath mats securely with double-sided, non-slip rug tape.  If you need to sit down while you are in the shower, use a non-slip stool.  Install grab bars by the toilet and in the bathtub and shower. Do not use towel bars as grab bars.  What can I do in the bedroom?  Make sure that you have a light by your bed that is easy to reach.  Do not use any sheets or blankets on your bed that hang to the floor.  Have a firm bench or chair with side arms that you can use for support when you get dressed.  What can I do in the kitchen?  Clean up any spills right away.  If you need to reach something above you, use a sturdy step stool that has a grab bar.  Keep electrical cables out of the way.  Do not use floor polish or wax that makes floors slippery.  What can I do with my stairs?  Do not leave anything on the stairs.  Make sure that you have a light switch at the top and the bottom of the stairs. Have them installed if you do not have them.  Make sure that there are handrails on both sides of the stairs. Fix handrails that are broken or loose. Make sure that handrails are as long as the staircases.  Install non-slip stair treads on all stairs in your home if they do not have carpet.  Avoid having throw rugs at the top or bottom of stairs, or secure the rugs with carpet tape to prevent them from moving.  Choose a carpet design that does not hide the edge of steps on the stairs. Make sure that carpet is  firmly attached to the stairs. Fix any carpet that is loose or worn.  What can I do on the outside of my home?  Use bright outdoor lighting.  Repair the edges of walkways and driveways and fix any cracks. Clear paths of anything that can make you trip, such as tools or rocks.  Add color or contrast paint or tape to clearly isaías and help you see high doorway thresholds.  Trim any bushes or trees on the main path into your home.  Check that handrails are securely fastened and in good repair. Both sides of all steps should have handrails.  Install guardrails along the edges of any raised decks or porches.  Have leaves, snow, and ice cleared regularly. Use sand, salt, or ice melt on walkways during winter months if you live where there is ice and snow.  In the garage, clean up any spills right away, including grease or oil spills.  What other actions can I take?  Review your medicines with your health care provider. Some medicines can make you confused or feel dizzy. This can increase your chance of falling.  Wear closed-toe shoes that fit well and support your feet. Wear shoes that have rubber soles and low heels.  Use a cane, walker, scooter, or crutches that help you move around if needed.  Talk with your provider about other ways that you can decrease your risk of falls. This may include seeing a physical therapist to learn to do exercises to improve movement and strength.  Where to find more information  Centers for Disease Control and Prevention, [place]: cdc.gov  National [place] on Aging: uche.nih.gov  National [place] on Aging: uche.nih.gov  Contact a health care provider if:  You are afraid of falling at home.  You feel weak, drowsy, or dizzy at home.  You fall at home.  Get help right away if you:  Lose consciousness or have trouble moving after a fall.  Have a fall that causes a head injury.  These symptoms may be an emergency. Get help right away. Call 911.  Do not wait to see if the symptoms will go  away.  Do not drive yourself to the hospital.  This information is not intended to replace advice given to you by your health care provider. Make sure you discuss any questions you have with your health care provider.  Document Revised: 08/21/2023 Document Reviewed: 08/21/2023  U-NOTE Patient Education © 2024 [employer].  Sit-to-Stand Exercise    The sit-to-stand exercise (also known as the chair stand or chair rise exercise) strengthens your lower body and helps you maintain or improve your mobility and independence. The end goal is to do the sit-to-stand exercise without using your hands. This will be easier as you become stronger. You should always talk with your health care provider before starting any exercise program, especially if you have had recent surgery.  Do the exercise exactly as told by your health care provider and adjust it as directed. It is normal to feel mild stretching, pulling, tightness, or discomfort as you do this exercise, but you should stop right away if you feel sudden pain or your pain gets worse. Do not begin doing this exercise until told by your health care provider.  What the sit-to-stand exercise does  The sit-to-stand exercise helps to strengthen the muscles in your thighs and the muscles in the center of your body that give you stability (core muscles). This exercise is especially helpful if:  You have had knee or hip surgery.  You have trouble getting up from a chair, out of a car, or off the toilet due to muscle weakness.  How to do the sit-to-stand exercise  Sit toward the front edge of a sturdy chair without armrests. Your knees should be bent and your feet should be flat on the floor and shoulder-width apart and underneath your hips.  Place your hands lightly on each side of the seat. Keep your back and neck as straight as possible, with your chest slightly forward.  Breathe in slowly. Lean forward and slightly shift your weight to the front of your feet.  Breathe out as  you slowly stand up. Try not to support any weight with your hands.  Stand and pause for a full breath in and out.  Breathe in as you sit down slowly. Tighten your core and abdominal muscles to control your lowering as much as possible. You should lower yourself back to the chair slowly, not just drop back into the seat.  Breathe out slowly.  Do this exercise 10-15 times. If needed, do it fewer times until you build up strength.  Rest for 1 minute, then do another set of 10-15 repetitions.  To change the difficulty of the sit-to-stand exercise  If the exercise is too difficult, use a chair with sturdy armrests, and push off the armrests to help you come to the standing position. You can also use the armrests to help slowly lower yourself back to sitting. As this gets easier, try to use your arms less. You can also place a firm cushion or pillow on the chair to make the surface higher.  If this exercise is too easy, do not use your arms to help raise or lower yourself. You can also wear a weighted vest, use hand weights, increase your repetitions, or try a lower chair.  General tips  You may feel tired when starting an exercise routine. This is normal.  You may have muscle soreness that lasts a few days. This is normal. As you get stronger, you may not feel muscle soreness.  Use smooth, steady movements.  Do not  hold your breath during strength exercises. This can cause unsafe changes in your blood pressure.  Breathe in slowly through your nose, and breathe out slowly through your mouth.  Summary  Strengthening your lower body is an important step to help you move safely and independently.  The sit-to-stand exercise helps strengthen the muscles in your thighs and core.  You should always talk with your health care provider before starting any exercise program, especially if you have had recent surgery.  This information is not intended to replace advice given to you by your health care provider. Make sure you  discuss any questions you have with your health care provider.  Document Revised: 04/10/2022 Document Reviewed: 04/10/2022  Elsevier Patient Education ©  [employer]  You are due for Shingrix vaccination series ( the newest shingles vaccine).  It is a two shot series spaced 2-6 months apart. Please get this vaccine series started at your earliest convenience at your local pharmacy to help avoid shingles outbreak. It is more effective than the old Zostavax vaccine and is recommended even if you have had the Zostavax vaccine in the past.  Once the Shingrix series is completed, it does not need to be repeated.   For more information, please look at the website below:  https://www.cdc.gov/vaccines/vpd/shingles/public/shingrix/index.html      Medicare Wellness  Personal Prevention Plan of Service     Date of Office Visit:    Encounter Provider:  Luis Shields DO  Place of Service:  [facility]  Patient Name: Anderson Goode  :  1955    As part of the Medicare Wellness portion of your visit today, we are providing you with this personalized preventive plan of services (PPPS). This plan is based upon recommendations of the [place] Preventive Services Task Force (USPSTF) and the Advisory Committee on Immunization Practices (ACIP).    This lists the preventive care services that should be considered, and provides dates of when you are due. Items listed as completed are up-to-date and do not require any further intervention.    Health Maintenance   Topic Date Due    Pneumococcal Vaccine 50+ (1 of 2 - PCV) Never done    ZOSTER VACCINE (2 of 3) 2015    ANNUAL WELLNESS VISIT  2025    TDAP/TD VACCINES (2 - Td or Tdap) 2025 (Originally 2020)    LIPID PANEL  2026    COLORECTAL CANCER SCREENING  2029    HEPATITIS C SCREENING  Completed    COVID-19 Vaccine  Completed    AAA SCREEN ONCE  Completed    INFLUENZA VACCINE  Discontinued     HEMOGLOBIN A1C  Discontinued    URINE MICROALBUMIN-CREATININE RATIO (uACR)  Discontinued       Orders Placed This Encounter   Procedures    DEXA Bone Density Axial     Standing Status:   Future     Expected Date:   6/22/2025     Expiration Date:   6/17/2026     Is patient taking or have taken long term Glucocorticoid (steroids)?:   No     Does the patient have rheumatoid arthritis?:   No     Reason for Exam::   osteoporosis screening     Release to patient:   Routine Release [[id number]]     Does the patient have secondary osteoporosis?:   No    PSA Screen     Standing Status:   Future     Number of Occurrences:   1     Expected Date:   6/17/2025     Expiration Date:   6/17/2026     Release to patient:   Routine Release [[id number]]    Ambulatory Referral to Physical Therapy for Evaluation & Treatment     Referral Priority:   Routine     Referral Type:   Physical Therapy     Referral Reason:   Specialty Services Required     Requested Specialty:   Physical Therapy     Number of Visits Requested:   1       Return in about 6 months (around 12/17/2025) for prediabetes (with A1c).

## 2025-06-26 ENCOUNTER — OFFICE VISIT (OUTPATIENT)
Dept: UROLOGY | Facility: CLINIC | Age: 70
End: 2025-06-26
Payer: MEDICARE

## 2025-06-26 DIAGNOSIS — R39.15 URINARY URGENCY: ICD-10-CM

## 2025-06-26 DIAGNOSIS — N13.8 BPH WITH OBSTRUCTION/LOWER URINARY TRACT SYMPTOMS: ICD-10-CM

## 2025-06-26 DIAGNOSIS — N40.1 BPH WITH OBSTRUCTION/LOWER URINARY TRACT SYMPTOMS: ICD-10-CM

## 2025-06-26 PROCEDURE — 1159F MED LIST DOCD IN RCRD: CPT | Performed by: STUDENT IN AN ORGANIZED HEALTH CARE EDUCATION/TRAINING PROGRAM

## 2025-06-26 PROCEDURE — 1160F RVW MEDS BY RX/DR IN RCRD: CPT | Performed by: STUDENT IN AN ORGANIZED HEALTH CARE EDUCATION/TRAINING PROGRAM

## 2025-06-26 PROCEDURE — 99213 OFFICE O/P EST LOW 20 MIN: CPT | Performed by: STUDENT IN AN ORGANIZED HEALTH CARE EDUCATION/TRAINING PROGRAM

## 2025-06-26 RX ORDER — TAMSULOSIN HYDROCHLORIDE 0.4 MG/1
1 CAPSULE ORAL DAILY
Qty: 90 CAPSULE | Refills: 3 | Status: SHIPPED | OUTPATIENT
Start: 2025-06-26

## 2025-06-26 RX ORDER — FINASTERIDE 5 MG/1
5 TABLET, FILM COATED ORAL DAILY
Qty: 90 TABLET | Refills: 3 | Status: SHIPPED | OUTPATIENT
Start: 2025-06-26

## 2025-06-26 NOTE — PROGRESS NOTES
Follow Up Office Visit      Patient Name: Anderson Goode  : 1955   MRN: [id number]     Chief Complaint:    Chief Complaint   Patient presents with    Urinary Urgency    BPH with obstruction/lower urinary tract symptoms       Referring Provider: No ref. provider found    History of Present Illness: Anderson Goode is a 69 y.o. male who presents today for follow up of BPH with LUTS and mildly elevated PSA. His prostate is 74 grams with obstruction and he has deferred surgical therapy. He is now on Detrol, Flomax, and Finasteride. His PSA is stable. He reports minimal urinary concerns. IPSS is 8.     Lab Results   Component Value Date    PSA 2.150 2025    PSA 3.7 2024    PSA 4.320 (H) 2023    PSA 3.530 2022    PSA 3.450 2021    PSA 2.230 2020     He is perseverating about bilateral hip complaints, mobility concerns and believes his prostate may be to blame.     Subjective      Review of System: Review of Systems   Genitourinary: Negative.       I have reviewed the ROS documented by my clinical staff, I have updated appropriately and I agree. Danilo Doe MD    I have reviewed and the following portions of the patient's history were updated as appropriate: past family history, past medical history, past social history, past surgical history and problem list.    Medications:     Current Outpatient Medications:     aspirin 325 MG tablet, Take 1 tablet by mouth Every Night., Disp: , Rfl:     atorvastatin (LIPITOR) 80 MG tablet, Take 1 tablet by mouth every night at bedtime., Disp: 90 tablet, Rfl: 3    Cetirizine HCl (ZyrTEC Allergy) 10 MG capsule, Take 10 mg by mouth Daily., Disp: 90 capsule, Rfl: 3    finasteride (PROSCAR) 5 MG tablet, Take 1 tablet by mouth Daily., Disp: 90 tablet, Rfl: 3    Glucosamine HCl 1500 MG tablet, Take 1,500 mg by mouth daily., Disp: 30 each, Rfl:     hypromellose (NATURES TEARS) 0.4 % solution, Apply  to eye., Disp: , Rfl:      ipratropium (ATROVENT) 0.03 % nasal spray, SPRAY ONE SPRAY INTO THE NOSTRIL(S) TWO TIMES A DAY AS NEEDED FOR RHINITIS, Disp: 30 mL, Rfl: 11    Multiple Vitamins-Minerals (CENTRUM ADULTS PO), Take  by mouth daily., Disp: , Rfl:     tamsulosin (FLOMAX) 0.4 MG capsule 24 hr capsule, Take 1 capsule by mouth Daily., Disp: 90 capsule, Rfl: 3    tolterodine LA (DETROL LA) 4 MG 24 hr capsule, Take 1 capsule by mouth Daily., Disp: 90 capsule, Rfl: 3    Allergies:   Allergies   Allergen Reactions    Cephalexin Rash    Omeprazole GI Intolerance    Myrbetriq [Mirabegron] Other (See Comments)     Hypertension         IPSS Questionnaire (AUA-7):  Over the past month…    1)  Incomplete Emptying:       How often have you had a sensation of not emptying you had the sensation of not emptying your bladder completely after you finished urinating?  0 - Not at all   2)  Frequency:       How often have you had the urinate again less than two hours after you finished urinating?  1 - Less than 1 time in 5   3)  Intermittency:       How often have you found you stopped and started again several times when you urinated?   4 - More than half the time   4) Urgency:      How often have you found it difficult to postpone urination?  1 - Less than 1 time in 5   5) Weak Stream:      How often have you had a weak urinary stream?  0 - Not at all   6) Straining:       How often have you had to push or strain to begin urination?  0 - Not at all   7) Nocturia:      How many times did you most typically get up to urinate from the time you went to bed at night until the time you got up in the morning?  2 - 2 times   Total Score:  8   The International Prostate Symptom Score (IPSS) is used to screen, diagnose, track symptoms of benign prostatic hyperplasia (BPH).   0-7 (Mild Symptoms) 8-19 (Moderate) 20-35 (Severe)   Quality of Life (QoL):  If you were to spend the rest of your life with your urinary condition just the way it is now, how would you feel  about that? 3-Mixed   Urine Leakage (Incontinence) 0-No Leakage       Objective     Physical Exam:   Vital Signs: There were no vitals filed for this visit.  There is no height or weight on file to calculate BMI.     Physical Exam  Constitutional:       Appearance: Normal appearance.   HENT:      Head: Normocephalic and atraumatic.      Nose: Nose normal.   Eyes:      Extraocular Movements: Extraocular movements intact.      Conjunctiva/sclera: Conjunctivae normal.      Pupils: Pupils are equal, round, and reactive to light.   Musculoskeletal:         General: Normal range of motion.      Cervical back: Normal range of motion and neck supple.   Skin:     General: Skin is warm and dry.      Findings: No lesion or rash.   Neurological:      General: No focal deficit present.      Mental Status: He is alert and oriented to person, place, and time. Mental status is at baseline.   Psychiatric:         Mood and Affect: Mood normal.         Behavior: Behavior normal.         Labs:   Brief Urine Lab Results  (Last result in the past 365 days)        Color   Clarity   Blood   Leuk Est   Nitrite   Protein   CREAT   Urine HCG        06/16/25 1049 Yellow   Clear   Negative   Negative   Negative   Negative                        Lab Results   Component Value Date    GLUCOSE 99 06/16/2025    CALCIUM 9.4 06/16/2025     (L) 06/16/2025    K 4.7 06/16/2025    CO2 26.6 06/16/2025    CL 98 06/16/2025    BUN 11.0 06/16/2025    CREATININE 0.71 (L) 06/16/2025    EGFRIFNONA 98 02/17/2021    BCR 15.5 06/16/2025    ANIONGAP 8.4 06/16/2025       Lab Results   Component Value Date    WBC 7.49 06/16/2025    HGB 13.8 06/16/2025    HCT 40.9 06/16/2025    MCV 94.5 06/16/2025     06/16/2025       Images:   CT Pelvis Without Contrast  Result Date: 5/7/2025  Impression: 1.No acute abnormality of the pelvis on CT. 2.Osteopenia. 3.Moderate degenerative changes of the hips and lower lumbar spine. 4.Diverticulosis without diverticulitis.  5.Enlarged prostate gland, hyperplasia versus neoplasia. Electronically Signed: Sharmila Jones MD  5/7/2025 4:25 PM EDT  Workstation ID: [id number]    XR Hips Bilateral With or Without Pelvis 2 View  Result Date: 4/23/2025  Impression: No acute fracture or dislocation of the bilateral hips. Electronically Signed: Gulshan Snyder MD  4/23/2025 1:02 PM EDT  Workstation ID: [id number]    XR Sacrum & Coccyx  Result Date: 4/23/2025  Impression: No acute fracture or dislocation of the bilateral hips. Electronically Signed: Gulshan Snyder MD  4/23/2025 1:02 PM EDT  Workstation ID: [id number]    XR Shoulder 2+ View Right  Result Date: 4/22/2025  Impression: 1.No acute fracture or dislocation. 2.Moderate acromioclavicular joint degenerative arthritis. Electronically Signed: Jluis Vicente MD  4/22/2025 8:41 PM EDT  Workstation ID: [id number]      Measures:   Tobacco:   Anderson Goode  reports that he has been smoking electronic cigarette. He has never been exposed to tobacco smoke. He has never used smokeless tobacco. I have educated him on the risk of diseases from using tobacco products such as cancer, COPD, and heart disease.     I advised him to quit and he is not willing to quit.    I spent 3  minutes counseling the patient.           Assessment / Plan      Assessment/Plan:   69 y.o. male who presented today for follow up of BPH with LUTS. PSA is stable.  Patient has been complaining of bladder urgency and frequency and this appears to be well-managed with 4 mg long-acting Detrol.  All meds been recently refilled.  He can see us back in 1 year for repeat PSA and symptom check. His hip complaints are unlikely related to anything urologic.     Diagnoses and all orders for this visit:    1. BPH with obstruction/lower urinary tract symptoms  -     finasteride (PROSCAR) 5 MG tablet; Take 1 tablet by mouth Daily.  Dispense: 90 tablet; Refill: 3  -     tamsulosin (FLOMAX) 0.4 MG capsule 24 hr capsule; Take 1 capsule by mouth Daily.   Dispense: 90 capsule; Refill: 3  -     PSA Diagnostic; Future    2. Urinary urgency  -     tamsulosin (FLOMAX) 0.4 MG capsule 24 hr capsule; Take 1 capsule by mouth Daily.  Dispense: 90 capsule; Refill: 3           Follow Up:   Return in about 1 year (around 6/26/2026) for 1 year with PA or NP .    I spent approximately 20 minutes providing clinical care for this patient; including review of patient's chart and provider documentation, face to face time spent with patient in examination room (obtaining history, performing physical exam, discussing diagnosis and management options), placing orders, and completing patient documentation.     Danilo Doe MD  [facility]

## 2025-07-07 ENCOUNTER — HOSPITAL ENCOUNTER (OUTPATIENT)
Dept: BONE DENSITY | Facility: HOSPITAL | Age: 70
Discharge: HOME | End: 2025-07-07
Admitting: FAMILY MEDICINE
Payer: MEDICARE

## 2025-07-07 DIAGNOSIS — M85.88 OSTEOPENIA OF LUMBAR SPINE: ICD-10-CM

## 2025-07-07 PROCEDURE — 77080 DXA BONE DENSITY AXIAL: CPT

## 2025-07-25 ENCOUNTER — HOSPITAL ENCOUNTER (OUTPATIENT)
Dept: MRI IMAGING | Facility: HOSPITAL | Age: 70
Discharge: HOME | End: 2025-07-25
Payer: MEDICARE

## 2025-07-25 DIAGNOSIS — M25.511 ACUTE PAIN OF RIGHT SHOULDER: ICD-10-CM

## 2025-07-25 DIAGNOSIS — S46.001A INJURY OF RIGHT ROTATOR CUFF, INITIAL ENCOUNTER: ICD-10-CM

## 2025-07-25 PROCEDURE — 73221 MRI JOINT UPR EXTREM W/O DYE: CPT

## 2025-08-01 ENCOUNTER — TELEPHONE (OUTPATIENT)
Dept: PHYSICAL THERAPY | Facility: CLINIC | Age: 70
End: 2025-08-01
Payer: MEDICARE

## 2025-08-01 NOTE — TELEPHONE ENCOUNTER
Caller: Anderson Goode    Relationship: Self    Best call back number:   Telephone Information:   Mobile [phone]         What was the call regarding: PATIENT HAS HAD MRI DONE WANT LATONYA TO LOOK AT THIS TO SEE IF HE NEEDS TO SCHEDULED ADDITIONAL PHYSICAL THERAPY.